# Patient Record
Sex: FEMALE | Race: BLACK OR AFRICAN AMERICAN | Employment: FULL TIME | URBAN - METROPOLITAN AREA
[De-identification: names, ages, dates, MRNs, and addresses within clinical notes are randomized per-mention and may not be internally consistent; named-entity substitution may affect disease eponyms.]

---

## 2019-12-15 ENCOUNTER — HOSPITAL ENCOUNTER (INPATIENT)
Age: 29
LOS: 4 days | Discharge: HOME | End: 2019-12-19
Attending: OBSTETRICS & GYNECOLOGY | Admitting: OBSTETRICS & GYNECOLOGY
Payer: COMMERCIAL

## 2019-12-15 DIAGNOSIS — Z34.90 ENCOUNTER FOR PLANNED INDUCTION OF LABOR: Primary | ICD-10-CM

## 2019-12-15 PROBLEM — Z3A.39 39 WEEKS GESTATION OF PREGNANCY: Status: ACTIVE | Noted: 2019-12-15

## 2019-12-15 PROBLEM — O13.3 GESTATIONAL HTN, THIRD TRIMESTER: Status: ACTIVE | Noted: 2019-12-15

## 2019-12-15 LAB
ERYTHROCYTE [DISTWIDTH] IN BLOOD BY AUTOMATED COUNT: 13.2 % (ref 11.9–14.6)
HCT VFR BLD AUTO: 35.4 % (ref 35.8–46.3)
HGB BLD-MCNC: 11.5 G/DL (ref 11.7–15.4)
MCH RBC QN AUTO: 33 PG (ref 26.1–32.9)
MCHC RBC AUTO-ENTMCNC: 32.5 G/DL (ref 31.4–35)
MCV RBC AUTO: 101.4 FL (ref 79.6–97.8)
NRBC # BLD: 0 K/UL (ref 0–0.2)
PLATELET # BLD AUTO: 152 K/UL (ref 150–450)
PMV BLD AUTO: 12.6 FL (ref 9.4–12.3)
RBC # BLD AUTO: 3.49 M/UL (ref 4.05–5.2)
WBC # BLD AUTO: 8.1 K/UL (ref 4.3–11.1)

## 2019-12-15 PROCEDURE — 10907ZC DRAINAGE OF AMNIOTIC FLUID, THERAPEUTIC FROM PRODUCTS OF CONCEPTION, VIA NATURAL OR ARTIFICIAL OPENING: ICD-10-PCS | Performed by: OBSTETRICS & GYNECOLOGY

## 2019-12-15 PROCEDURE — 75410000002 HC LABOR FEE PER 1 HR: Performed by: OBSTETRICS & GYNECOLOGY

## 2019-12-15 PROCEDURE — 85027 COMPLETE CBC AUTOMATED: CPT

## 2019-12-15 PROCEDURE — 77030019905 HC CATH URETH INTMIT MDII -A

## 2019-12-15 PROCEDURE — 74011250637 HC RX REV CODE- 250/637: Performed by: OBSTETRICS & GYNECOLOGY

## 2019-12-15 PROCEDURE — 74011000258 HC RX REV CODE- 258: Performed by: OBSTETRICS & GYNECOLOGY

## 2019-12-15 PROCEDURE — 74011250636 HC RX REV CODE- 250/636: Performed by: OBSTETRICS & GYNECOLOGY

## 2019-12-15 PROCEDURE — 3E033VJ INTRODUCTION OF OTHER HORMONE INTO PERIPHERAL VEIN, PERCUTANEOUS APPROACH: ICD-10-PCS | Performed by: OBSTETRICS & GYNECOLOGY

## 2019-12-15 PROCEDURE — 86900 BLOOD TYPING SEROLOGIC ABO: CPT

## 2019-12-15 PROCEDURE — 65270000029 HC RM PRIVATE

## 2019-12-15 RX ORDER — ZOLPIDEM TARTRATE 5 MG/1
5 TABLET ORAL
Status: DISCONTINUED | OUTPATIENT
Start: 2019-12-15 | End: 2019-12-17

## 2019-12-15 RX ORDER — LIDOCAINE HYDROCHLORIDE 10 MG/ML
1 INJECTION INFILTRATION; PERINEURAL
Status: ACTIVE | OUTPATIENT
Start: 2019-12-15 | End: 2019-12-16

## 2019-12-15 RX ORDER — OXYTOCIN/RINGER'S LACTATE 15/250 ML
250 PLASTIC BAG, INJECTION (ML) INTRAVENOUS ONCE
Status: ACTIVE | OUTPATIENT
Start: 2019-12-15 | End: 2019-12-16

## 2019-12-15 RX ORDER — SODIUM CHLORIDE 0.9 % (FLUSH) 0.9 %
5-40 SYRINGE (ML) INJECTION EVERY 8 HOURS
Status: DISCONTINUED | OUTPATIENT
Start: 2019-12-15 | End: 2019-12-17

## 2019-12-15 RX ORDER — SODIUM CHLORIDE 0.9 % (FLUSH) 0.9 %
5-40 SYRINGE (ML) INJECTION AS NEEDED
Status: DISCONTINUED | OUTPATIENT
Start: 2019-12-15 | End: 2019-12-17

## 2019-12-15 RX ORDER — OXYTOCIN/RINGER'S LACTATE 30/500 ML
0-25 PLASTIC BAG, INJECTION (ML) INTRAVENOUS
Status: DISCONTINUED | OUTPATIENT
Start: 2019-12-15 | End: 2019-12-17 | Stop reason: HOSPADM

## 2019-12-15 RX ORDER — LIDOCAINE HYDROCHLORIDE 20 MG/ML
JELLY TOPICAL
Status: ACTIVE | OUTPATIENT
Start: 2019-12-15 | End: 2019-12-16

## 2019-12-15 RX ORDER — BUTORPHANOL TARTRATE 2 MG/ML
1 INJECTION INTRAMUSCULAR; INTRAVENOUS
Status: DISCONTINUED | OUTPATIENT
Start: 2019-12-15 | End: 2019-12-17 | Stop reason: HOSPADM

## 2019-12-15 RX ORDER — MINERAL OIL
120 OIL (ML) ORAL
Status: ACTIVE | OUTPATIENT
Start: 2019-12-15 | End: 2019-12-16

## 2019-12-15 RX ORDER — DEXTROSE, SODIUM CHLORIDE, SODIUM LACTATE, POTASSIUM CHLORIDE, AND CALCIUM CHLORIDE 5; .6; .31; .03; .02 G/100ML; G/100ML; G/100ML; G/100ML; G/100ML
125 INJECTION, SOLUTION INTRAVENOUS CONTINUOUS
Status: DISCONTINUED | OUTPATIENT
Start: 2019-12-15 | End: 2019-12-17

## 2019-12-15 RX ADMIN — ZOLPIDEM TARTRATE 5 MG: 5 TABLET ORAL at 23:19

## 2019-12-15 RX ADMIN — SODIUM CHLORIDE 5 MILLION UNITS: 900 INJECTION, SOLUTION INTRAVENOUS at 21:25

## 2019-12-15 RX ADMIN — SODIUM CHLORIDE, SODIUM LACTATE, POTASSIUM CHLORIDE, CALCIUM CHLORIDE, AND DEXTROSE MONOHYDRATE 125 ML/HR: 600; 310; 30; 20; 5 INJECTION, SOLUTION INTRAVENOUS at 21:27

## 2019-12-15 RX ADMIN — MISOPROSTOL 25 MCG: 100 TABLET ORAL at 21:09

## 2019-12-16 ENCOUNTER — ANESTHESIA (OUTPATIENT)
Dept: MOTHER INFANT UNIT | Age: 29
End: 2019-12-16
Payer: COMMERCIAL

## 2019-12-16 LAB
ABO + RH BLD: NORMAL
BLOOD GROUP ANTIBODIES SERPL: NORMAL
ERYTHROCYTE [DISTWIDTH] IN BLOOD BY AUTOMATED COUNT: 12.8 % (ref 11.9–14.6)
HCT VFR BLD AUTO: 36 % (ref 35.8–46.3)
HGB BLD-MCNC: 11.8 G/DL (ref 11.7–15.4)
MCH RBC QN AUTO: 33 PG (ref 26.1–32.9)
MCHC RBC AUTO-ENTMCNC: 32.8 G/DL (ref 31.4–35)
MCV RBC AUTO: 100.6 FL (ref 79.6–97.8)
NRBC # BLD: 0 K/UL (ref 0–0.2)
PLATELET # BLD AUTO: 153 K/UL (ref 150–450)
PMV BLD AUTO: 12.2 FL (ref 9.4–12.3)
RBC # BLD AUTO: 3.58 M/UL (ref 4.05–5.2)
SPECIMEN EXP DATE BLD: NORMAL
WBC # BLD AUTO: 9.5 K/UL (ref 4.3–11.1)

## 2019-12-16 PROCEDURE — 85027 COMPLETE CBC AUTOMATED: CPT

## 2019-12-16 PROCEDURE — 75410000002 HC LABOR FEE PER 1 HR: Performed by: OBSTETRICS & GYNECOLOGY

## 2019-12-16 PROCEDURE — 36415 COLL VENOUS BLD VENIPUNCTURE: CPT

## 2019-12-16 PROCEDURE — 74011250636 HC RX REV CODE- 250/636: Performed by: OBSTETRICS & GYNECOLOGY

## 2019-12-16 PROCEDURE — 74011250637 HC RX REV CODE- 250/637: Performed by: OBSTETRICS & GYNECOLOGY

## 2019-12-16 PROCEDURE — 65270000029 HC RM PRIVATE

## 2019-12-16 RX ORDER — MISOPROSTOL 100 UG/1
50 TABLET ORAL
Status: DISCONTINUED | OUTPATIENT
Start: 2019-12-16 | End: 2019-12-17

## 2019-12-16 RX ADMIN — MISOPROSTOL 25 MCG: 100 TABLET ORAL at 01:54

## 2019-12-16 RX ADMIN — PENICILLIN G POTASSIUM 2.5 MILLION UNITS: 20000000 POWDER, FOR SOLUTION INTRAVENOUS at 06:18

## 2019-12-16 RX ADMIN — ZOLPIDEM TARTRATE 5 MG: 5 TABLET ORAL at 22:31

## 2019-12-16 RX ADMIN — PENICILLIN G POTASSIUM 2.5 MILLION UNITS: 20000000 POWDER, FOR SOLUTION INTRAVENOUS at 10:22

## 2019-12-16 RX ADMIN — MISOPROSTOL 50 MCG: 100 TABLET ORAL at 18:18

## 2019-12-16 RX ADMIN — MISOPROSTOL 25 MCG: 100 TABLET ORAL at 22:31

## 2019-12-16 RX ADMIN — MISOPROSTOL 25 MCG: 100 TABLET ORAL at 22:16

## 2019-12-16 RX ADMIN — PENICILLIN G POTASSIUM 2.5 MILLION UNITS: 20000000 POWDER, FOR SOLUTION INTRAVENOUS at 01:54

## 2019-12-16 NOTE — PROGRESS NOTES
Pt to L & D for scheduled IOL. Pt requesting bariatric gown. Will call 3rd floor and have one sent up.

## 2019-12-16 NOTE — PROGRESS NOTES
Pt would like to shower now. Asked for IV to be removed, very uncomfortable. Would rather restart it than deal with current iv placement.

## 2019-12-16 NOTE — PROGRESS NOTES
MD at bedside. Attempt of AROM unsuccessful, CHIQUITA fetal monitor patches replaced. MD orders received to start pitocin on pt.

## 2019-12-16 NOTE — PROGRESS NOTES
Dr Eric Walls on the phone. Orders to stop pitocin, pt may eat, may stop penicillin, and will restart cytotec tonight. Restart antibiotics when pt starts laboring.

## 2019-12-16 NOTE — H&P
History & Physical    Name: Gerry Meade MRN: [id number]  SSN: [id number]    YOB: 1990  Age: 30 y.o. Sex: female      Subjective:     Estimated Date of Delivery: 19  OB History    Para Term  AB Living   4       2 1   SAB TAB Ectopic Molar Multiple Live Births                    # Outcome Date GA Lbr Shaka/2nd Weight Sex Delivery Anes PTL Lv   4 Current            3             2 AB            1 AB                Ms. Regis Gross is admitted with pregnancy at 39w1d for induction of labor due to hypertension. Prenatal course was complicated by increase in BP. Please see prenatal records for details. Past Medical History:   Diagnosis Date    Gestational hypertension     History of elective  8/1/2016    x2    PCOS (polycystic ovarian syndrome)      Past Surgical History:   Procedure Laterality Date    HX OTHER SURGICAL  2014    EAB x2    HX TONSILLECTOMY  2010    HX WISDOM TEETH EXTRACTION       Social History     Occupational History    Not on file   Tobacco Use    Smoking status: Never Smoker    Smokeless tobacco: Never Used   Substance and Sexual Activity    Alcohol use: No    Drug use: No    Sexual activity: Yes     Partners: Male     Birth control/protection: None     Comment: trying to conceive     Family History   Problem Relation Age of Onset    Hypertension Maternal Grandmother        No Known Allergies  Prior to Admission medications    Medication Sig Start Date End Date Taking? Authorizing Provider   PNV No12-Iron-FA-DSS-OM-3 29 mg iron-1 mg -50 mg CPKD Take  by mouth. Yes Provider, Historical        Review of Systems: A comprehensive review of systems was negative except for that written in the History of Present Illness.     Objective:     Vitals:  Vitals:    19 1228 19 0852 12/15/19 2051   BP:   137/70   Pulse:   (!) 108   Resp:   18   Temp:   98.6 °F (37 °C)   Weight: 144.2 kg (318 lb)     Height:  5' 9\" (1.753 m) Physical Exam:  Patient without distress. Heart: Regular rate and rhythm  Lung: clear to auscultation throughout lung fields, no wheezes, no rales, no rhonchi and normal respiratory effort  Abdomen: soft, nontender  Fundus: soft and non tender  Perineum: blood absent, amniotic fluid absent  Cervical Exam: 1 cm dilated    50% effaced    Consistency: Soft  Membranes:  Intact  Attempted to AROM but unsuccessful  Fetal Heart Rate: Reactive          Prenatal Labs:   Lab Results   Component Value Date/Time    ABO/Rh(D) A POSITIVE 12/15/2019 09:01 PM       Impression/Plan:     Active Problems:    39 weeks gestation of pregnancy (12/15/2019)      Gestational HTN, third trimester (12/15/2019)      Encounter for planned induction of labor (12/15/2019)         Plan: Admit for induction of labor. Group B Strep positive, will treat prophylactically with penicillin.

## 2019-12-16 NOTE — PROGRESS NOTES
Dr Glynn Hodges updated to pt status- pt feeling mild-moderate contractions. Pitocin is at 22 mu/min. Orders for RN to check cervix.

## 2019-12-16 NOTE — PROGRESS NOTES
12/15/19 2105   Cervical Exam   Dilation (cm) 1   Eff 50 %   Station -2   Position Posterior   Cervical Consistency Soft   Vaginal exam done byTimothy shirley rn   Cytotec 25mcg given buccal as ordered.

## 2019-12-16 NOTE — PROGRESS NOTES
PCN infusing as ordered. Cytotec 25 mcg given as ordered. Pt denies needs at this time. Encouraged to call out PRN.

## 2019-12-17 ENCOUNTER — ANESTHESIA (OUTPATIENT)
Dept: LABOR AND DELIVERY | Age: 29
End: 2019-12-17
Payer: COMMERCIAL

## 2019-12-17 ENCOUNTER — ANESTHESIA EVENT (OUTPATIENT)
Dept: LABOR AND DELIVERY | Age: 29
End: 2019-12-17
Payer: COMMERCIAL

## 2019-12-17 PROCEDURE — 76060000078 HC EPIDURAL ANESTHESIA: Performed by: OBSTETRICS & GYNECOLOGY

## 2019-12-17 PROCEDURE — 77030002974 HC SUT PLN J&J -A: Performed by: OBSTETRICS & GYNECOLOGY

## 2019-12-17 PROCEDURE — A4300 CATH IMPL VASC ACCESS PORTAL: HCPCS | Performed by: ANESTHESIOLOGY

## 2019-12-17 PROCEDURE — 77030014125 HC TY EPDRL BBMI -B: Performed by: ANESTHESIOLOGY

## 2019-12-17 PROCEDURE — 74011250636 HC RX REV CODE- 250/636: Performed by: NURSE ANESTHETIST, CERTIFIED REGISTERED

## 2019-12-17 PROCEDURE — 76060000078 HC EPIDURAL ANESTHESIA

## 2019-12-17 PROCEDURE — 65270000029 HC RM PRIVATE

## 2019-12-17 PROCEDURE — 74011000250 HC RX REV CODE- 250: Performed by: ANESTHESIOLOGY

## 2019-12-17 PROCEDURE — 77030010848 HC CATH INTUTR PRSS KOLB -B

## 2019-12-17 PROCEDURE — 77030034696 HC CATH URETH FOL 2W BARD -A

## 2019-12-17 PROCEDURE — 74011000258 HC RX REV CODE- 258: Performed by: OBSTETRICS & GYNECOLOGY

## 2019-12-17 PROCEDURE — 77030003665 HC NDL SPN BBMI -A: Performed by: ANESTHESIOLOGY

## 2019-12-17 PROCEDURE — 75410000003 HC RECOV DEL/VAG/CSECN EA 0.5 HR: Performed by: OBSTETRICS & GYNECOLOGY

## 2019-12-17 PROCEDURE — 77030011943: Performed by: OBSTETRICS & GYNECOLOGY

## 2019-12-17 PROCEDURE — 74011000250 HC RX REV CODE- 250: Performed by: NURSE ANESTHETIST, CERTIFIED REGISTERED

## 2019-12-17 PROCEDURE — C1765 ADHESION BARRIER: HCPCS | Performed by: OBSTETRICS & GYNECOLOGY

## 2019-12-17 PROCEDURE — 76010000392 HC C SECN EA ADDL 0.5 HR: Performed by: OBSTETRICS & GYNECOLOGY

## 2019-12-17 PROCEDURE — 77030019905 HC CATH URETH INTMIT MDII -A

## 2019-12-17 PROCEDURE — 77030018846 HC SOL IRR STRL H20 ICUM -A: Performed by: OBSTETRICS & GYNECOLOGY

## 2019-12-17 PROCEDURE — 77030032490 HC SLV COMPR SCD KNE COVD -B: Performed by: OBSTETRICS & GYNECOLOGY

## 2019-12-17 PROCEDURE — 77030018809 HC RETRCTR ALXSO DISP AMR -B: Performed by: OBSTETRICS & GYNECOLOGY

## 2019-12-17 PROCEDURE — 74011250637 HC RX REV CODE- 250/637: Performed by: OBSTETRICS & GYNECOLOGY

## 2019-12-17 PROCEDURE — 77030033542 HC RETRCTR RETNTS PANICLS GQSM -B: Performed by: OBSTETRICS & GYNECOLOGY

## 2019-12-17 PROCEDURE — 74011250637 HC RX REV CODE- 250/637: Performed by: ANESTHESIOLOGY

## 2019-12-17 PROCEDURE — 77030031139 HC SUT VCRL2 J&J -A: Performed by: OBSTETRICS & GYNECOLOGY

## 2019-12-17 PROCEDURE — 76010000391 HC C SECN FIRST 1 HR: Performed by: OBSTETRICS & GYNECOLOGY

## 2019-12-17 PROCEDURE — 74011250636 HC RX REV CODE- 250/636: Performed by: ANESTHESIOLOGY

## 2019-12-17 PROCEDURE — 77030040361 HC SLV COMPR DVT MDII -B

## 2019-12-17 PROCEDURE — 77010026065 HC OXYGEN MINIMUM MEDICAL AIR

## 2019-12-17 PROCEDURE — 77030018836 HC SOL IRR NACL ICUM -A: Performed by: OBSTETRICS & GYNECOLOGY

## 2019-12-17 PROCEDURE — 75410000002 HC LABOR FEE PER 1 HR

## 2019-12-17 PROCEDURE — 74011250636 HC RX REV CODE- 250/636: Performed by: OBSTETRICS & GYNECOLOGY

## 2019-12-17 RX ORDER — ONDANSETRON 2 MG/ML
INJECTION INTRAMUSCULAR; INTRAVENOUS AS NEEDED
Status: DISCONTINUED | OUTPATIENT
Start: 2019-12-17 | End: 2019-12-17 | Stop reason: HOSPADM

## 2019-12-17 RX ORDER — TRISODIUM CITRATE DIHYDRATE AND CITRIC ACID MONOHYDRATE 500; 334 MG/5ML; MG/5ML
30 SOLUTION ORAL ONCE
Status: DISCONTINUED | OUTPATIENT
Start: 2019-12-17 | End: 2019-12-17 | Stop reason: HOSPADM

## 2019-12-17 RX ORDER — DIPHENHYDRAMINE HYDROCHLORIDE 50 MG/ML
12.5 INJECTION, SOLUTION INTRAMUSCULAR; INTRAVENOUS
Status: DISCONTINUED | OUTPATIENT
Start: 2019-12-17 | End: 2019-12-18

## 2019-12-17 RX ORDER — SODIUM CHLORIDE, SODIUM LACTATE, POTASSIUM CHLORIDE, CALCIUM CHLORIDE 600; 310; 30; 20 MG/100ML; MG/100ML; MG/100ML; MG/100ML
INJECTION, SOLUTION INTRAVENOUS
Status: DISCONTINUED | OUTPATIENT
Start: 2019-12-17 | End: 2019-12-17 | Stop reason: HOSPADM

## 2019-12-17 RX ORDER — NALOXONE HYDROCHLORIDE 0.4 MG/ML
0.2 INJECTION, SOLUTION INTRAMUSCULAR; INTRAVENOUS; SUBCUTANEOUS
Status: DISCONTINUED | OUTPATIENT
Start: 2019-12-17 | End: 2019-12-18

## 2019-12-17 RX ORDER — BUTORPHANOL TARTRATE 1 MG/ML
INJECTION INTRAMUSCULAR; INTRAVENOUS AS NEEDED
Status: DISCONTINUED | OUTPATIENT
Start: 2019-12-17 | End: 2019-12-17 | Stop reason: HOSPADM

## 2019-12-17 RX ORDER — OXYCODONE HYDROCHLORIDE 5 MG/1
10 TABLET ORAL
Status: DISCONTINUED | OUTPATIENT
Start: 2019-12-17 | End: 2019-12-18

## 2019-12-17 RX ORDER — EPHEDRINE SULFATE/0.9% NACL/PF 50 MG/5 ML
SYRINGE (ML) INTRAVENOUS AS NEEDED
Status: DISCONTINUED | OUTPATIENT
Start: 2019-12-17 | End: 2019-12-17 | Stop reason: HOSPADM

## 2019-12-17 RX ORDER — ONDANSETRON 2 MG/ML
4 INJECTION INTRAMUSCULAR; INTRAVENOUS
Status: DISCONTINUED | OUTPATIENT
Start: 2019-12-17 | End: 2019-12-18

## 2019-12-17 RX ORDER — ACETAMINOPHEN 500 MG
1000 TABLET ORAL
Status: DISCONTINUED | OUTPATIENT
Start: 2019-12-17 | End: 2019-12-18

## 2019-12-17 RX ORDER — FAMOTIDINE 10 MG/1
20 TABLET ORAL ONCE
Status: COMPLETED | OUTPATIENT
Start: 2019-12-17 | End: 2019-12-17

## 2019-12-17 RX ORDER — SODIUM CHLORIDE, SODIUM LACTATE, POTASSIUM CHLORIDE, CALCIUM CHLORIDE 600; 310; 30; 20 MG/100ML; MG/100ML; MG/100ML; MG/100ML
125 INJECTION, SOLUTION INTRAVENOUS CONTINUOUS
Status: DISCONTINUED | OUTPATIENT
Start: 2019-12-17 | End: 2019-12-18

## 2019-12-17 RX ORDER — HYDROMORPHONE HYDROCHLORIDE 1 MG/ML
1 INJECTION, SOLUTION INTRAMUSCULAR; INTRAVENOUS; SUBCUTANEOUS
Status: DISCONTINUED | OUTPATIENT
Start: 2019-12-17 | End: 2019-12-18

## 2019-12-17 RX ORDER — PHENYLEPHRINE HYDROCHLORIDE 10 MG/ML
INJECTION INTRAVENOUS AS NEEDED
Status: DISCONTINUED | OUTPATIENT
Start: 2019-12-17 | End: 2019-12-17 | Stop reason: HOSPADM

## 2019-12-17 RX ORDER — NALBUPHINE HYDROCHLORIDE 10 MG/ML
5 INJECTION, SOLUTION INTRAMUSCULAR; INTRAVENOUS; SUBCUTANEOUS
Status: DISCONTINUED | OUTPATIENT
Start: 2019-12-17 | End: 2019-12-18

## 2019-12-17 RX ORDER — MORPHINE SULFATE 0.5 MG/ML
INJECTION, SOLUTION EPIDURAL; INTRATHECAL; INTRAVENOUS AS NEEDED
Status: DISCONTINUED | OUTPATIENT
Start: 2019-12-17 | End: 2019-12-17 | Stop reason: HOSPADM

## 2019-12-17 RX ORDER — LIDOCAINE HYDROCHLORIDE 20 MG/ML
INJECTION, SOLUTION EPIDURAL; INFILTRATION; INTRACAUDAL; PERINEURAL AS NEEDED
Status: DISCONTINUED | OUTPATIENT
Start: 2019-12-17 | End: 2019-12-17 | Stop reason: HOSPADM

## 2019-12-17 RX ORDER — ACETAMINOPHEN 10 MG/ML
1000 INJECTION, SOLUTION INTRAVENOUS ONCE
Status: COMPLETED | OUTPATIENT
Start: 2019-12-17 | End: 2019-12-17

## 2019-12-17 RX ORDER — SODIUM CHLORIDE, SODIUM LACTATE, POTASSIUM CHLORIDE, CALCIUM CHLORIDE 600; 310; 30; 20 MG/100ML; MG/100ML; MG/100ML; MG/100ML
150 INJECTION, SOLUTION INTRAVENOUS CONTINUOUS
Status: DISCONTINUED | OUTPATIENT
Start: 2019-12-17 | End: 2019-12-17 | Stop reason: HOSPADM

## 2019-12-17 RX ORDER — ROPIVACAINE HYDROCHLORIDE 2 MG/ML
INJECTION, SOLUTION EPIDURAL; INFILTRATION; PERINEURAL
Status: DISCONTINUED | OUTPATIENT
Start: 2019-12-17 | End: 2019-12-17 | Stop reason: HOSPADM

## 2019-12-17 RX ORDER — KETOROLAC TROMETHAMINE 30 MG/ML
30 INJECTION, SOLUTION INTRAMUSCULAR; INTRAVENOUS
Status: DISCONTINUED | OUTPATIENT
Start: 2019-12-17 | End: 2019-12-18

## 2019-12-17 RX ORDER — OXYTOCIN/RINGER'S LACTATE 30/500 ML
PLASTIC BAG, INJECTION (ML) INTRAVENOUS
Status: DISCONTINUED | OUTPATIENT
Start: 2019-12-17 | End: 2019-12-17 | Stop reason: HOSPADM

## 2019-12-17 RX ORDER — LIDOCAINE HYDROCHLORIDE AND EPINEPHRINE 15; 5 MG/ML; UG/ML
INJECTION, SOLUTION EPIDURAL
Status: COMPLETED | OUTPATIENT
Start: 2019-12-17 | End: 2019-12-17

## 2019-12-17 RX ADMIN — SODIUM CHLORIDE, SODIUM LACTATE, POTASSIUM CHLORIDE, CALCIUM CHLORIDE, AND DEXTROSE MONOHYDRATE 125 ML/HR: 600; 310; 30; 20; 5 INJECTION, SOLUTION INTRAVENOUS at 07:56

## 2019-12-17 RX ADMIN — CEFAZOLIN 3 G: 1 INJECTION, POWDER, FOR SOLUTION INTRAVENOUS at 18:04

## 2019-12-17 RX ADMIN — Medication 10 ML/HR: at 10:52

## 2019-12-17 RX ADMIN — FAMOTIDINE 20 MG: 10 TABLET ORAL at 17:46

## 2019-12-17 RX ADMIN — LIDOCAINE HYDROCHLORIDE 5 ML: 20 INJECTION, SOLUTION EPIDURAL; INFILTRATION; INTRACAUDAL; PERINEURAL at 17:56

## 2019-12-17 RX ADMIN — LIDOCAINE HYDROCHLORIDE,EPINEPHRINE BITARTRATE 5 ML: 15; .005 INJECTION, SOLUTION EPIDURAL; INFILTRATION; INTRACAUDAL; PERINEURAL at 10:42

## 2019-12-17 RX ADMIN — MORPHINE SULFATE 1 MG: 0.5 INJECTION, SOLUTION EPIDURAL; INTRATHECAL; INTRAVENOUS at 19:05

## 2019-12-17 RX ADMIN — BUTORPHANOL TARTRATE 1 MG: 2 INJECTION, SOLUTION INTRAMUSCULAR; INTRAVENOUS at 09:07

## 2019-12-17 RX ADMIN — SODIUM CHLORIDE 5 MILLION UNITS: 900 INJECTION, SOLUTION INTRAVENOUS at 08:09

## 2019-12-17 RX ADMIN — LIDOCAINE HYDROCHLORIDE 5 ML: 20 INJECTION, SOLUTION EPIDURAL; INFILTRATION; INTRACAUDAL; PERINEURAL at 18:03

## 2019-12-17 RX ADMIN — LIDOCAINE HYDROCHLORIDE 5 ML: 20 INJECTION, SOLUTION EPIDURAL; INFILTRATION; INTRACAUDAL; PERINEURAL at 18:07

## 2019-12-17 RX ADMIN — PHENYLEPHRINE HYDROCHLORIDE 100 MCG: 10 INJECTION INTRAVENOUS at 18:47

## 2019-12-17 RX ADMIN — SODIUM CHLORIDE, SODIUM LACTATE, POTASSIUM CHLORIDE, AND CALCIUM CHLORIDE: 600; 310; 30; 20 INJECTION, SOLUTION INTRAVENOUS at 19:13

## 2019-12-17 RX ADMIN — ACETAMINOPHEN 1000 MG: 10 INJECTION, SOLUTION INTRAVENOUS at 21:57

## 2019-12-17 RX ADMIN — MORPHINE SULFATE 1 MG: 0.5 INJECTION, SOLUTION EPIDURAL; INTRATHECAL; INTRAVENOUS at 19:07

## 2019-12-17 RX ADMIN — MISOPROSTOL 50 MCG: 100 TABLET ORAL at 02:37

## 2019-12-17 RX ADMIN — Medication 10 MG: at 11:12

## 2019-12-17 RX ADMIN — MORPHINE SULFATE 2 MG: 0.5 INJECTION, SOLUTION EPIDURAL; INTRATHECAL; INTRAVENOUS at 19:00

## 2019-12-17 RX ADMIN — MORPHINE SULFATE 1 MG: 0.5 INJECTION, SOLUTION EPIDURAL; INTRATHECAL; INTRAVENOUS at 19:02

## 2019-12-17 RX ADMIN — BUTORPHANOL TARTRATE 2 MG: 1 INJECTION, SOLUTION INTRAMUSCULAR; INTRAVENOUS at 19:00

## 2019-12-17 RX ADMIN — Medication 500 ML/HR: at 18:25

## 2019-12-17 RX ADMIN — SODIUM CHLORIDE, SODIUM LACTATE, POTASSIUM CHLORIDE, AND CALCIUM CHLORIDE: 600; 310; 30; 20 INJECTION, SOLUTION INTRAVENOUS at 18:09

## 2019-12-17 RX ADMIN — AZITHROMYCIN DIHYDRATE 500 MG: 500 INJECTION, POWDER, LYOPHILIZED, FOR SOLUTION INTRAVENOUS at 18:30

## 2019-12-17 RX ADMIN — ONDANSETRON 4 MG: 2 INJECTION INTRAMUSCULAR; INTRAVENOUS at 18:24

## 2019-12-17 RX ADMIN — Medication 20 MG: at 11:11

## 2019-12-17 RX ADMIN — PENICILLIN G POTASSIUM 2.5 MILLION UNITS: 20000000 POWDER, FOR SOLUTION INTRAVENOUS at 15:49

## 2019-12-17 RX ADMIN — Medication 10 MG: at 18:47

## 2019-12-17 RX ADMIN — PENICILLIN G POTASSIUM 2.5 MILLION UNITS: 20000000 POWDER, FOR SOLUTION INTRAVENOUS at 11:59

## 2019-12-17 RX ADMIN — Medication 2 MILLI-UNITS/MIN: at 07:57

## 2019-12-17 RX ADMIN — KETOROLAC TROMETHAMINE 30 MG: 30 INJECTION, SOLUTION INTRAMUSCULAR at 21:57

## 2019-12-17 NOTE — PROGRESS NOTES
sbar to FARZANEH monahan. Pt assumed for care. FOB , Mother and daughter present at the bedside. Reactive tracing continues.

## 2019-12-17 NOTE — PROGRESS NOTES
SBAR report from Mary Garsia. Care assumed. 1045- Test dose given per Dr Parham Apo. 1051- Pt in right hip tilt. Dose given. Pt tolerated well.

## 2019-12-17 NOTE — PROGRESS NOTES
Received care of pt from DIAZ Mcgee RN. Awaiting Dr. Devin Soulier for plan of care, reviewed with pt, verbalizes understanding.

## 2019-12-17 NOTE — PROGRESS NOTES
1215 Office called for prenatal labs. States they will send over. 1330 Prenatal labs requested again. Waiting for office to fax.

## 2019-12-17 NOTE — ANESTHESIA PREPROCEDURE EVALUATION
Relevant Problems   No relevant active problems       Anesthetic History   No history of anesthetic complications            Review of Systems / Medical History  Patient summary reviewed and pertinent labs reviewed    Pulmonary  Within defined limits                 Neuro/Psych   Within defined limits           Cardiovascular    Hypertension              Exercise tolerance: >4 METS     GI/Hepatic/Renal     GERD: well controlled           Endo/Other        Morbid obesity     Other Findings              Physical Exam    Airway  Mallampati: III  TM Distance: 4 - 6 cm  Neck ROM: normal range of motion   Mouth opening: Normal     Cardiovascular    Rhythm: regular           Dental  No notable dental hx       Pulmonary                 Abdominal         Other Findings            Anesthetic Plan    ASA: 3, emergent  Anesthesia type: epidural      Post-op pain plan if not by surgeon: epidural opioid      Anesthetic plan and risks discussed with: Patient and Family      To or for emergency cs for fetal distress and failure to progress, well functioning epidural cath in place will be used for surgery

## 2019-12-17 NOTE — PROGRESS NOTES
sve due to fhr tracing. o2 in place. Pt on her left tilt. No cervical change. 5-6 cms with caput, -2 station with caput.

## 2019-12-17 NOTE — PROGRESS NOTES
Dr Toni Ybarra called- Nyla Bess given through nurse- late decelerations with no cervical change. Caput. 5-6 cms/ -2 station. Pt placed on the peanut and variability increasing at this time.

## 2019-12-17 NOTE — ANESTHESIA PROCEDURE NOTES
Epidural Block    Start time: 12/17/2019 10:31 AM  End time: 12/17/2019 10:42 AM  Performed by: Irean Severance, MD  Authorized by: Irean Severance, MD     Pre-Procedure  Indication: labor epidural    Preanesthetic Checklist: patient identified, risks and benefits discussed, anesthesia consent, patient being monitored, timeout performed and anesthesia consent    Timeout Time: 10:31        Epidural:   Patient position:  Seated  Prep region:  Lumbar  Prep: Patient draped and Chlorhexidine    Location:  L3-4    Needle and Epidural Catheter:   Needle Type:  Tuohy  Needle Gauge:  17 G  Injection Technique:  Loss of resistance using air  Attempts:  2  Catheter Size:  19 G  Events: no blood with aspiration, no cerebrospinal fluid with aspiration, no paresthesia and negative aspiration test    Test Dose:  Lidocaine 1.5% w/ epi and negative    Assessment:   Catheter Secured:  Tegaderm and tape  Insertion:  Uncomplicated  Patient tolerance:  Patient tolerated the procedure well with no immediate complications  All needles out intact, procedure tolerated well without problems

## 2019-12-17 NOTE — PROGRESS NOTES
SVE-4/90/-3  Pt has not changed since about noon. Her FHTs have had some decels off and on with variables, then improves to be reactive. Discussed with pt and  that she has had no progress. Explained that every time the nurse turns the pit back on she decels and she has to turn it off. Discussed risks and benefits of LTCS in detail. Pt wishes to proceed.

## 2019-12-17 NOTE — PROGRESS NOTES
1106- Late decels noted. Pt repositioned. IV fluid bolus infusing. 1114- FHR down to 60 for 4.5min. Pitocin off, O2 on at 10L. Pt repositioned to right side. 1127-  decreased variability. Dr Elodia Dolan notified of FHR and actions taken.

## 2019-12-17 NOTE — PROGRESS NOTES
Pericare/ pad change. Nessa tracing well now. Pt resting quietly. Feeling contractions now. pcn infusing- 5 million units.

## 2019-12-17 NOTE — PROGRESS NOTES
Variable decels continue. Pitocin off. O2 on at 10L. Pt repositioned. Dr Apolinar Lyon notified. Orders to call OB hospitalist to assess and to place [facility].

## 2019-12-17 NOTE — PROGRESS NOTES
0730- arom successfully by dr Edison Isabel. 1 cms.  Clear fluid  Iv patent, LR infusing KVO   Will start pitocin as ordered

## 2019-12-17 NOTE — PROGRESS NOTES
Verified with Dr. Travis Lynne as to pts orders for evening. Pt to receive 50mcg cytotec Q4 tonight, as appropriate.

## 2019-12-18 ENCOUNTER — ANESTHESIA EVENT (OUTPATIENT)
Dept: MOTHER INFANT UNIT | Age: 29
End: 2019-12-18
Payer: COMMERCIAL

## 2019-12-18 PROCEDURE — 74011250637 HC RX REV CODE- 250/637: Performed by: ANESTHESIOLOGY

## 2019-12-18 PROCEDURE — 74011250636 HC RX REV CODE- 250/636: Performed by: ANESTHESIOLOGY

## 2019-12-18 PROCEDURE — 65270000029 HC RM PRIVATE

## 2019-12-18 PROCEDURE — 74011250637 HC RX REV CODE- 250/637: Performed by: OBSTETRICS & GYNECOLOGY

## 2019-12-18 RX ORDER — IBUPROFEN 800 MG/1
800 TABLET ORAL EVERY 6 HOURS
Status: DISCONTINUED | OUTPATIENT
Start: 2019-12-18 | End: 2019-12-19 | Stop reason: HOSPADM

## 2019-12-18 RX ORDER — DOCUSATE SODIUM 100 MG/1
100 CAPSULE, LIQUID FILLED ORAL 2 TIMES DAILY
Status: DISCONTINUED | OUTPATIENT
Start: 2019-12-18 | End: 2019-12-19 | Stop reason: HOSPADM

## 2019-12-18 RX ORDER — SODIUM CHLORIDE 9 MG/ML
50 INJECTION, SOLUTION INTRAVENOUS CONTINUOUS
Status: DISCONTINUED | OUTPATIENT
Start: 2019-12-18 | End: 2019-12-18

## 2019-12-18 RX ORDER — OXYCODONE HYDROCHLORIDE 5 MG/1
5 TABLET ORAL
Status: DISCONTINUED | OUTPATIENT
Start: 2019-12-18 | End: 2019-12-19 | Stop reason: HOSPADM

## 2019-12-18 RX ORDER — ACETAMINOPHEN 500 MG
1000 TABLET ORAL EVERY 6 HOURS
Status: DISCONTINUED | OUTPATIENT
Start: 2019-12-18 | End: 2019-12-19 | Stop reason: HOSPADM

## 2019-12-18 RX ORDER — SODIUM CHLORIDE, SODIUM LACTATE, POTASSIUM CHLORIDE, CALCIUM CHLORIDE 600; 310; 30; 20 MG/100ML; MG/100ML; MG/100ML; MG/100ML
150 INJECTION, SOLUTION INTRAVENOUS CONTINUOUS
Status: DISCONTINUED | OUTPATIENT
Start: 2019-12-18 | End: 2019-12-18

## 2019-12-18 RX ORDER — SODIUM CHLORIDE 0.9 % (FLUSH) 0.9 %
5-40 SYRINGE (ML) INJECTION AS NEEDED
Status: DISCONTINUED | OUTPATIENT
Start: 2019-12-18 | End: 2019-12-18

## 2019-12-18 RX ORDER — NALOXONE HYDROCHLORIDE 0.4 MG/ML
0.4 INJECTION, SOLUTION INTRAMUSCULAR; INTRAVENOUS; SUBCUTANEOUS AS NEEDED
Status: DISCONTINUED | OUTPATIENT
Start: 2019-12-18 | End: 2019-12-19 | Stop reason: HOSPADM

## 2019-12-18 RX ORDER — DIPHENHYDRAMINE HCL 25 MG
25 CAPSULE ORAL
Status: DISCONTINUED | OUTPATIENT
Start: 2019-12-18 | End: 2019-12-19 | Stop reason: HOSPADM

## 2019-12-18 RX ORDER — ONDANSETRON 4 MG/1
4 TABLET, ORALLY DISINTEGRATING ORAL
Status: DISCONTINUED | OUTPATIENT
Start: 2019-12-18 | End: 2019-12-19 | Stop reason: HOSPADM

## 2019-12-18 RX ORDER — HYDROMORPHONE HYDROCHLORIDE 1 MG/ML
1 INJECTION, SOLUTION INTRAMUSCULAR; INTRAVENOUS; SUBCUTANEOUS
Status: DISCONTINUED | OUTPATIENT
Start: 2019-12-18 | End: 2019-12-19 | Stop reason: HOSPADM

## 2019-12-18 RX ORDER — SIMETHICONE 80 MG
80 TABLET,CHEWABLE ORAL
Status: DISCONTINUED | OUTPATIENT
Start: 2019-12-18 | End: 2019-12-19 | Stop reason: HOSPADM

## 2019-12-18 RX ORDER — SODIUM CHLORIDE 0.9 % (FLUSH) 0.9 %
5-40 SYRINGE (ML) INJECTION EVERY 8 HOURS
Status: DISCONTINUED | OUTPATIENT
Start: 2019-12-18 | End: 2019-12-19 | Stop reason: HOSPADM

## 2019-12-18 RX ORDER — SODIUM CHLORIDE, SODIUM LACTATE, POTASSIUM CHLORIDE, CALCIUM CHLORIDE 600; 310; 30; 20 MG/100ML; MG/100ML; MG/100ML; MG/100ML
100 INJECTION, SOLUTION INTRAVENOUS CONTINUOUS
Status: DISCONTINUED | OUTPATIENT
Start: 2019-12-18 | End: 2019-12-19 | Stop reason: HOSPADM

## 2019-12-18 RX ADMIN — DOCUSATE SODIUM 100 MG: 100 CAPSULE, LIQUID FILLED ORAL at 19:22

## 2019-12-18 RX ADMIN — KETOROLAC TROMETHAMINE 30 MG: 30 INJECTION, SOLUTION INTRAMUSCULAR at 04:06

## 2019-12-18 RX ADMIN — ACETAMINOPHEN 1000 MG: 500 TABLET, FILM COATED ORAL at 08:09

## 2019-12-18 RX ADMIN — OXYCODONE 10 MG: 5 TABLET ORAL at 11:54

## 2019-12-18 RX ADMIN — SODIUM CHLORIDE, SODIUM LACTATE, POTASSIUM CHLORIDE, AND CALCIUM CHLORIDE 125 ML/HR: 600; 310; 30; 20 INJECTION, SOLUTION INTRAVENOUS at 01:35

## 2019-12-18 RX ADMIN — ACETAMINOPHEN 1000 MG: 500 TABLET, FILM COATED ORAL at 13:54

## 2019-12-18 RX ADMIN — OXYCODONE 5 MG: 5 TABLET ORAL at 19:22

## 2019-12-18 RX ADMIN — ACETAMINOPHEN 1000 MG: 500 TABLET, FILM COATED ORAL at 22:27

## 2019-12-18 RX ADMIN — IBUPROFEN 800 MG: 800 TABLET, FILM COATED ORAL at 19:22

## 2019-12-18 RX ADMIN — KETOROLAC TROMETHAMINE 30 MG: 30 INJECTION, SOLUTION INTRAMUSCULAR at 10:02

## 2019-12-18 RX ADMIN — SIMETHICONE CHEW TAB 80 MG 80 MG: 80 TABLET ORAL at 22:27

## 2019-12-18 NOTE — PROGRESS NOTES
SBAR OUT Report: Mother    Verbal report given to Laura Sanchez RN on this patient, who is now being transferred to MIU for routine progression of care. The patient is wearing a green \"Anesthesia-Duramorph\" band. Report consisted of patient's Situation, Background, Assessment and Recommendations (SBAR). [place] ID bands were compared with the identification form, and verified with the patient and receiving nurse. Information from the Procedure Summary, Intake/Output and MAR and the [place] Report was reviewed with the receiving nurse; opportunity for questions and clarification provided.

## 2019-12-18 NOTE — PROGRESS NOTES
Chart reviewed - no needs identified.  made introduction to family and provided informational packet on  mood disorder education/resources. Patient denies any history of postpartum depression/anxiety. Family receptive to receiving information and denied any additional needs from . Family has 's contact information should any needs/questions arise.     BAKARI Nicholas  Bryce   [phone]

## 2019-12-18 NOTE — LACTATION NOTE

## 2019-12-18 NOTE — LACTATION NOTE
This note was copied from a baby's chart. In to see mom and infant for help w/ feeding. Mom's 2nd child. 1st baby never latched well, pumped and bottle fed for 6-7 months. Did not have to supplement w/ formula during that time. Mom has very short nipples, thick areola, nipple that bunches and turtles in w/ compression. Attempted w/ mom for 15 minutes- baby sleepy. No sustained sucking. Encouraged mom to try to pump few minutes before next attempt to see if further helps rosario nipple to help w/ latching. Pump after any poor/fair feeds. May need nipple shield in future if baby continues to have hard time getting on. Reviewed 1st and 2nd 24 hr feeding/output expectations. Mom pumping and lactation fed baby 5 mls formula afer feeding attempt per mom's request. Brenda Garcia does finger feeding w/ curve tip syringe well.

## 2019-12-18 NOTE — L&D DELIVERY NOTE
LOW TRANSVERSE  SECTION   FULL OP NOTE    PATIENT: Khanh Bolanos  MRN: [id number]      PREOPERATIVE DIAGNOSIS:39.2 weeks, Arrest of dilation, Arrest of descent, fetal intolerance       POSTOPERATIVE DIAGNOSIS: Same    PROCEDURE:  Low transverse  section    SURGEON: Peg Barclay MD    ANESTHESIA: Spinal    ESTIMATED BLOOD LOSS:   500cc        PROCEDURE IN DETAIL: The patient was taken to the operating room where spinal anesthesia was found to be adequate. Time out was done to confirm the operating procedure, surgeon, patient and site. Once confirmed by the team, procedure was started. The patient was prepped and draped in the normal sterile fashion. A Pfannenstiel skin incision was made with a scalpel and carried down to the underlying fascia. The fascial incision was extended laterally with Vasquez scissors. The superior edge of the fascial incision was grasped with Kocher clamps, tented up, and the underlying rectus muscle dissected off bluntly. The inferior edge of the rectus fascia was grasped with Kocher clamps, tented up, and the underlying rectus muscle dissected off bluntly. The rectus muscles were divided in the midline bluntly. The peritoneum was entered bluntly and extended superiorly and inferiorly with the Metzenbaum scissors. A bladder blade was then inserted. The vesicouterine peritoneum was identified, grasped with pickups and entered sharply with the Metzenbaum scissors and extended laterally. The bladder flap was then created digitally and a bladder blade was reinserted. A low transverse uterine incision was made with the scalpel and extended laterally with blunt finger dissection. The babys head was then delivered atraumatically. The nose and mouth were suctioned. The cord was clamped and cut. The baby was handed off to the awaiting  Intensive Care Unit staff. The placenta was then delivered spontaneously.  The uterus was exteriorized and the uterus was cleared of all clots and debris. The uterine incision was closed in two layers; the first layer was a running locked layer of 0 Vicryl and the second layer was an imbricating layer of 0 Vicryl. Good hemostasis was assured. The bladder flap was then reapproximated with 2-0 Vicryl in a running fashion. Good hemostasis was assured. Paracolic gutters were washed with warm normal saline. The uterus was returned to the abdomen. A piece of Intercede was placed over the uterine incision and the peritoneum was then closed with 2-0 Vicryl in a running fashion. The fascia was closed with 0-Vicryl in a running fashion. Good hemostasis was assured throughout. The subcuticular layers were reapproximated with 2-0 plain gut in an interrupted fashion. The skin was closed with 4-0 Vicryl in a subcuticular fashion. The patient tolerated the procedure well. Sponge, lap, and needle counts were correct times two. The patient was taken to the recovery in stable condition. Delivery Summary    Patient: Gregory Vera MRN: [id number]  SSN: [id number]    YOB: 1990  Age: 30 y.o. Sex: female        Information for the patient's :  Petr Merino [[id number]]       Labor Events:    Labor: No    Steroids: None   Cervical Ripening Date/Time:       Cervical Ripening Type: Misoprostol   Antibiotics During Labor: Yes   Rupture Identifier:      Rupture Date/Time: 2019 7:30 AM   Rupture Type: AROM   Amniotic Fluid Volume:      Amniotic Fluid Description: Clear    Amniotic Fluid Odor: None    Induction: Prostaglandins       Induction Date/Time:        Indications for Induction:      Augmentation: AROM   Augmentation Date/Time:      Indications for Augmentation: Hypertension   Labor complications: Dysfunctional Labor;Fetal Intolerance; Failure to Progress in First Stage       Additional complications:        Delivery Events:  Indications For Episiotomy:     Episiotomy: None   Perineal Laceration(s): None   Repaired: Periurethral Laceration Location:      Repaired:     Labial Laceration Location:     Repaired:     Sulcal Laceration Location:     Repaired:     Vaginal Laceration Location:     Repaired:     Cervical Laceration Location:     Repaired:     Repair Suture:     Number of Repair Packets:     Estimated Blood Loss (ml): 500ml     Delivery Date: 2019    Delivery Time: 6:40 PM   Delivery Type: , Low Transverse     Details    Trial of Labor: Yes   Primary/Repeat: Primary   Priority: Routine   Indications:  Arrest of Descent; Fetal Intolerance of Labor; Failure to Progress       Sex:  Male     Gestational Age: 44w2d  Delivery Clinician:  Dru Ku  Living Status: Living   Delivery Location: L&D 432          APGARS  One minute Five minutes Ten minutes   Skin color: 1   1        Heart rate: 2   2        Grimace: 2   2        Muscle tone: 2   2        Breathin   2        Totals: 9   9          Presentation: Vertex    Position: Left Occiput Posterior  Resuscitation Method:  Suctioning-bulb; Tactile Stimulation     Meconium Stained: None      Cord Information: 3 Vessels  Complications: Nuchal Cord With Compressions  Cord around: shoulders  Delayed cord clamping? Yes  Cord clamped date/time:2019  6:41 PM  Disposition of Cord Blood: Lab    Blood Gases Sent?: No    Placenta:  Date/Time: 2019  6:42 PM  Removal: Manual Removal      Appearance: Normal     [place] Measurements:  Birth Weight: 3.44 kg      Birth Length: 52 cm      Head Circumference: 36.5 cm      Chest Circumference: 33 cm     Abdominal Girth:       Other Providers:   ATUL RODRÍGUEZ;TK COSTA;SLOANE POON;SHAMIKA RIOS R;ENRIQUE THOMAS;NICOLE MCKEON;RUTHIE SEQUEIRA;GIANLUCA SUAREZ;EMERSON VILLALOBOS, Obstetrician;Primary Nurse;Primary [place] Nurse;Neonatologist;Anesthesiologist;Crna;Scrub Tech;Scrub Tech;Respiratory Therapist             Group B Strep: No results found for: GRBSEXT, GRBSEXT  Information for the patient's :  Matteo Bennett [[id number]]     Lab Results   Component Value Date/Time    ABO/Rh(D) A NEGATIVE 2019 06:40 PM    ANTONIETA IgG NEG 2019 06:40 PM     No results for input(s): PCO2CB, PO2CB, HCO3I, SO2I, IBD, PTEMPI, SPECTI, PHICB, ISITE, IDEV, IALLEN in the last 72 hours.

## 2019-12-18 NOTE — PROGRESS NOTES
Admission assessment complete as noted. Patient oriented to room and unit. Plan of care reviewed and patient verbalizes understanding. Questions encouraged and answered. Patent encouraged to call for needs or concerns. Safety Teaching reviewed:   1. Hand hygiene prior to handling the infant. 2. Bracelets with matching numbers are placed on mother and infant  3. An infant security tag  [facility]) is placed on the infant's ankle and monitored  4. All OB nurses wear pink Employee badges - do not give your baby to anyone without proper identification. 5. Never leave the baby alone in the room. 6. The infant should be placed on their back to sleep. on a firm mattress. No toys should be placed in the crib. (safe sleep video offered to view)  7. Never shake the baby (video offered to view)  8. Infant fall prevention - do not sleep with the baby, and place the baby in the crib while ambulating. 5. Mother and Baby Care booklet given to Mother.

## 2019-12-18 NOTE — ANESTHESIA POSTPROCEDURE EVALUATION
Procedure(s):   SECTION. epidural    Anesthesia Post Evaluation      Multimodal analgesia: multimodal analgesia used between 6 hours prior to anesthesia start to PACU discharge  Patient location during evaluation: bedside  Patient participation: complete - patient participated  Level of consciousness: awake and alert  Pain management: adequate  Airway patency: patent  Anesthetic complications: no  Cardiovascular status: hemodynamically stable  Respiratory status: spontaneous ventilation  Hydration status: euvolemic  Comments: Patient stable and may discharge at this time. Good result with epidural anesthesia, resolving normally. No vitals data found for the desired time range.

## 2019-12-18 NOTE — PROGRESS NOTES
SBAR IN Report: Mother    Verbal report received from Ezequiel De La Cruz RN on this patient, who is now being transferred from labor and delivery for routine progression of care. The patient is wearing a green \"Anesthesia-Duramorph\" band. Report consisted of patient's Situation, Background, Assessment and Recommendations (SBAR). [place] ID bands were compared with the identification form, and verified with the patient and transferring nurse. Information from the SBAR, Kardex, OR Summary, Intake/Output, MAR and Recent Results and the [place] Report was reviewed with the transferring nurse; opportunity for questions and clarification provided.

## 2019-12-18 NOTE — PROGRESS NOTES
Nguyen left in due to blood tinged urine, IV capped, sequential device discontinued. Ambulated to restroom for teaching of hung-care and pad change. Bed linen changed. Returned to bed safely. Tolerated without difficulty.

## 2019-12-19 VITALS
RESPIRATION RATE: 18 BRPM | WEIGHT: 293 LBS | HEART RATE: 98 BPM | SYSTOLIC BLOOD PRESSURE: 151 MMHG | DIASTOLIC BLOOD PRESSURE: 79 MMHG | HEIGHT: 69 IN | OXYGEN SATURATION: 98 % | BODY MASS INDEX: 43.4 KG/M2 | TEMPERATURE: 98.6 F

## 2019-12-19 LAB
ERYTHROCYTE [DISTWIDTH] IN BLOOD BY AUTOMATED COUNT: 13.3 % (ref 11.9–14.6)
HCT VFR BLD AUTO: 27.9 % (ref 35.8–46.3)
HGB BLD-MCNC: 9 G/DL (ref 11.7–15.4)
MCH RBC QN AUTO: 33 PG (ref 26.1–32.9)
MCHC RBC AUTO-ENTMCNC: 32.3 G/DL (ref 31.4–35)
MCV RBC AUTO: 102.2 FL (ref 79.6–97.8)
NRBC # BLD: 0 K/UL (ref 0–0.2)
PLATELET # BLD AUTO: 118 K/UL (ref 150–450)
PMV BLD AUTO: 11.6 FL (ref 9.4–12.3)
RBC # BLD AUTO: 2.73 M/UL (ref 4.05–5.2)
WBC # BLD AUTO: 10.3 K/UL (ref 4.3–11.1)

## 2019-12-19 PROCEDURE — 90715 TDAP VACCINE 7 YRS/> IM: CPT | Performed by: OBSTETRICS & GYNECOLOGY

## 2019-12-19 PROCEDURE — 36415 COLL VENOUS BLD VENIPUNCTURE: CPT

## 2019-12-19 PROCEDURE — 74011250637 HC RX REV CODE- 250/637: Performed by: OBSTETRICS & GYNECOLOGY

## 2019-12-19 PROCEDURE — 74011250636 HC RX REV CODE- 250/636: Performed by: OBSTETRICS & GYNECOLOGY

## 2019-12-19 PROCEDURE — 85027 COMPLETE CBC AUTOMATED: CPT

## 2019-12-19 RX ORDER — IBUPROFEN 800 MG/1
800 TABLET ORAL EVERY 6 HOURS
Qty: 60 TAB | Refills: 1 | Status: SHIPPED | OUTPATIENT
Start: 2019-12-19 | End: 2021-08-24

## 2019-12-19 RX ORDER — OXYCODONE HYDROCHLORIDE 5 MG/1
5 TABLET ORAL
Qty: 30 TAB | Refills: 0 | Status: SHIPPED | OUTPATIENT
Start: 2019-12-19 | End: 2019-12-22

## 2019-12-19 RX ADMIN — IBUPROFEN 800 MG: 800 TABLET, FILM COATED ORAL at 08:36

## 2019-12-19 RX ADMIN — OXYCODONE 5 MG: 5 TABLET ORAL at 04:29

## 2019-12-19 RX ADMIN — SIMETHICONE CHEW TAB 80 MG 80 MG: 80 TABLET ORAL at 10:48

## 2019-12-19 RX ADMIN — DOCUSATE SODIUM 100 MG: 100 CAPSULE, LIQUID FILLED ORAL at 08:36

## 2019-12-19 RX ADMIN — OXYCODONE 5 MG: 5 TABLET ORAL at 10:48

## 2019-12-19 RX ADMIN — IBUPROFEN 800 MG: 800 TABLET, FILM COATED ORAL at 16:40

## 2019-12-19 RX ADMIN — ACETAMINOPHEN 1000 MG: 500 TABLET, FILM COATED ORAL at 04:29

## 2019-12-19 RX ADMIN — ACETAMINOPHEN 1000 MG: 500 TABLET, FILM COATED ORAL at 10:48

## 2019-12-19 RX ADMIN — OXYCODONE 5 MG: 5 TABLET ORAL at 00:31

## 2019-12-19 RX ADMIN — SIMETHICONE CHEW TAB 80 MG 80 MG: 80 TABLET ORAL at 08:36

## 2019-12-19 RX ADMIN — SIMETHICONE CHEW TAB 80 MG 80 MG: 80 TABLET ORAL at 16:40

## 2019-12-19 RX ADMIN — IBUPROFEN 800 MG: 800 TABLET, FILM COATED ORAL at 01:32

## 2019-12-19 RX ADMIN — TETANUS TOXOID, REDUCED DIPHTHERIA TOXOID AND ACELLULAR PERTUSSIS VACCINE, ADSORBED 0.5 ML: 5; 2.5; 8; 8; 2.5 SUSPENSION INTRAMUSCULAR at 09:17

## 2019-12-19 NOTE — LACTATION NOTE
IN to follow up with  Mom and infant prior to discharge to home. Experienced mom stated that infant has latched and nursed. She is also pumping and offering infant formula supplement. Reviewed discharge information as well as gave mom a feeding plan and reviewed that as well. Mom and infant are following up with [facility] and will see lactation consultant there.

## 2019-12-19 NOTE — LACTATION NOTE
Individualized Feeding Plan for Breastfeeding   Lactation Services [phone]      As much as possible, hold your baby on your chest so babys bare skin is against your bare skin with a blanket covering babys back, especially 30 minutes before it is time for baby to eat. Watch for early feeding cues such as, licking lips, sucking motions, rooting, hands to mouth. Crying is a late feeding cue. Feed your baby at least 8 times in 24 hours, or more if your baby is showing feeding cues. If baby is sleepy put baby skin to skin and watch for hunger cues. To rouse baby: unwrap, undress, massage hands, feet, & back, change diaper, gently change babys position from lying to sitting. 15-20 minutes on the first breast of active breastfeeding is considered a good feeding. Good, active breastfeeding is when baby is alert, tugging the nipple, their ear may move, and you can hear swallows. Allow baby to finish the first side before changing sides. Sleeping at the breast or only brief, light sucks should not be considered a good, full breastfeed. At each feeding:  __x__1. Do Suck Practice on finger before each feeding until sucking pattern is smooth. Try using index finger. Nail down towards tongue. __x__2. Hand Express for a few minutes prior to latching to help start milk flow. __x__3. Baby needs to NURSE WELL x 15-20 minutes on at least first breast, burp and offer 2nd breast at every feeding. If no sustained latch only attempt at breast for 10 minutes. If baby does not latch on and feed well on at least one side, you should:   __x__4. Double pump for 15 minutes with breast massage and compression. Hand express for an additional 2-3 minutes per side. Pump after each feeding attempt or poor feeding, up to 8 times per day. If you are not putting baby to the breast you need to pump 8 times a day. Pump every 3 hours. __x__5.  Give baby all of the breast milk you obtain using a straight syringe or  curved syringe. If baby does NOT have enough wet and dirty diapers per day, is jaundiced/lethargic, or has significant weight loss AND you do NOT pump enough milk for each feeding (per volume listed below), formula supplementation may need to be used. Call lactation department /pediatrician if you have concerns. AVERAGE INTAKES OF COLOSTRUM BY HEALTHY  INFANTS:  Time  Day Intake (ml/feed)  Based on 8 feedings per day. 24-48 hrs  2 5-15 ml       Based on every 3 hour feedings  48-72 hrs  3 15-30 ml (0.5-1 oz)  72-96 hrs  4 30-45 ml (1-1.5oz)                           5         45-60 ml (1.5-2oz)                           6         60-75 ml (2-2.5oz)                           7          75-90 ml (2.5-3oz)    By day 7, baby will need 75 ml or 2-2.5 oz at each feeding based on 8 feedings per day & babys weight. (1oz = 30ml). Total milk volume needed in 24 hours by Day 7 is 20.2 oz per day based on baby's birthweight of 7 lbs 9 oz. The more often baby eats, the less volume they need per feeding. If baby is eating more often than the minimum of 8 times per day, they may take less per feeding. Use feeding plan until follow up with pediatrician. Continue to attempt at the breast for most feeds. Pump every 3 hours if no latch. Give all pumped colostrum/breastmilk at each feeding. Mom and infant Outpatient services are located on the 4th floor at [address] [place] [address]. Check in at the 4th floor registration desk (the same one you used when you came to have your baby).   Call for questions [phone]

## 2019-12-19 NOTE — DISCHARGE SUMMARY
Obstetrical Discharge Summary     Name: Ugo Vazquez MRN: [id number]  SSN: [id number]    YOB: 1990  Age: 30 y.o. Sex: female      Allergies: Patient has no known allergies. Admit Date: 12/15/2019    Discharge Date: 2019     Admitting Physician: Zoe Rangel MD     Attending Physician:  Tani Johnston MD     * Admission Diagnoses: 39 weeks gestation of pregnancy [Z3A.39]  Gestational HTN, third trimester [O13.3]  Encounter for planned induction of labor [Z34.90]    * Discharge Diagnoses:   Information for the patient's :  Sully Blankenship [[id number]]   Delivery of a 3.44 kg male infant via , Low Transverse on 2019 at 6:40 PM  by Zoe Rangel. Apgars were 9  and 9 . Additional Diagnoses:   Hospital Problems as of 2019 Date Reviewed: 2019          Codes Class Noted - Resolved POA    39 weeks gestation of pregnancy ICD-10-CM: Z3A.39  ICD-9-CM: V22.2  12/15/2019 - Present Unknown        Gestational HTN, third trimester ICD-10-CM: O13.3  ICD-9-CM: 642.33  12/15/2019 - Present Unknown        Encounter for planned induction of labor ICD-10-CM: Z34.90  ICD-9-CM: V22.1  12/15/2019 - Present Unknown             Lab Results   Component Value Date/Time    ABO/Rh(D) A POSITIVE 12/15/2019 09:01 PM      Immunization History   Administered Date(s) Administered    Influenza Vaccine 2014    Tdap 2019       * Procedures:   Procedure(s):   SECTION           * Discharge Condition: good    * Hospital Course: Normal hospital course following the delivery. * Disposition: Home    Discharge Medications:   Current Discharge Medication List      START taking these medications    Details   ibuprofen (MOTRIN) 800 mg tablet Take 1 Tab by mouth every six (6) hours. Qty: 60 Tab, Refills: 1      oxyCODONE IR (ROXICODONE) 5 mg immediate release tablet Take 1 Tab by mouth every four (4) hours as needed for Pain for up to 3 days.  Max Daily Amount: 30 mg.  Qty: 30 Tab, Refills: 0    Associated Diagnoses: Encounter for planned induction of labor         CONTINUE these medications which have NOT CHANGED    Details   PNV No12-Iron-FA-DSS-OM-3 29 mg iron-1 mg -50 mg CPKD Take  by mouth. * Follow-up Care/Patient Instructions:   Activity: Activity as tolerated and No sex for 6 weeks  Diet: Regular Diet  Wound Care: Keep wound clean and dry    Follow-up Information     Follow up With Specialties Details Why [address] [facility], Wilber Higgins [address] [place] [address]  [phone]

## 2019-12-19 NOTE — DISCHARGE INSTRUCTIONS
Patient Education         Section: What to Expect at Home  Your Recovery    A  section, or , is surgery to deliver your baby through a cut, called an incision, that the doctor makes in your lower belly and uterus. You may have some pain in your lower belly and need pain medicine for 1 to 2 weeks. You can expect some vaginal bleeding for several weeks. You will probably need about 6 weeks to fully recover. It is important to take it easy while the incision is healing. Avoid heavy lifting, strenuous activities, or exercises that strain the belly muscles while you are recovering. Ask a family member or friend for help with housework, cooking, and shopping. This care sheet gives you a general idea about how long it will take for you to recover. But each person recovers at a different pace. Follow the steps below to get better as quickly as possible. How can you care for yourself at home? Activity    · Rest when you feel tired. Getting enough sleep will help you recover.     · Try to walk each day. Start by walking a little more than you did the day before. Bit by bit, increase the amount you walk. Walking boosts blood flow and helps prevent pneumonia, constipation, and blood clots.     · Avoid strenuous activities, such as bicycle riding, jogging, weightlifting, and aerobic exercise, for 6 weeks or until your doctor says it is okay.     · Until your doctor says it is okay, do not lift anything heavier than your baby.     · Do not do sit-ups or other exercises that strain the belly muscles for 6 weeks or until your doctor says it is okay.     · Hold a pillow over your incision when you cough or take deep breaths. This will support your belly and decrease your pain.     · You may shower as usual. Pat the incision dry when you are done.     · You will have some vaginal bleeding. Wear sanitary pads.  Do not douche or use tampons until your doctor says it is okay.     · Ask your doctor when you can drive again.     · You will probably need to take at least 6 weeks off work. It depends on the type of work you do and how you feel.     · Ask your doctor when it is okay for you to have sex. Diet    · You can eat your normal diet. If your stomach is upset, try bland, low-fat foods like plain rice, broiled chicken, toast, and yogurt.     · Drink plenty of fluids (unless your doctor tells you not to).     · You may notice that your bowel movements are not regular right after your surgery. This is common. Try to avoid constipation and straining with bowel movements. You may want to take a fiber supplement every day. If you have not had a bowel movement after a couple of days, ask your doctor about taking a mild laxative.     · If you are breastfeeding, limit alcohol. Alcohol can cause a lack of energy and other health problems for the baby when a breastfeeding woman drinks heavily. It can also get in the way of a mom's ability to feed her baby or to care for the child in other ways. There isn't a lot of research about exactly how much alcohol can harm a baby. Having no alcohol is the safest choice for your baby. If you choose to have a drink now and then, have only one drink, and limit the number of occasions that you have a drink. Wait to breastfeed at least 2 hours after you have a drink to reduce the amount of alcohol the baby may get in the milk. Medicines    · Your doctor will tell you if and when you can restart your medicines. He or she will also give you instructions about taking any new medicines.     · If you take blood thinners, such as warfarin (Coumadin), clopidogrel (Plavix), or aspirin, be sure to talk to your doctor. He or she will tell you if and when to start taking those medicines again. Make sure that you understand exactly what your doctor wants you to do.     · Take pain medicines exactly as directed. ? If the doctor gave you a prescription medicine for pain, take it as prescribed. ?  If you are not taking a prescription pain medicine, ask your doctor if you can take an over-the-counter medicine.     · If you think your pain medicine is making you sick to your stomach:  ? Take your medicine after meals (unless your doctor has told you not to). ? Ask your doctor for a different pain medicine.     · If your doctor prescribed antibiotics, take them as directed. Do not stop taking them just because you feel better. You need to take the full course of antibiotics. Incision care    · If you have strips of tape on the incision, leave the tape on for a week or until it falls off.     · Wash the area daily with warm, soapy water, and pat it dry. Don't use hydrogen peroxide or alcohol, which can slow healing. You may cover the area with a gauze bandage if it weeps or rubs against clothing. Change the bandage every day.     · Keep the area clean and dry. Other instructions    · If you breastfeed your baby, you may be more comfortable while you are healing if you place the baby so that he or she is not resting on your belly. Try tucking your baby under your arm, with his or her body along the side you will be feeding on. Support your baby's upper body with your arm. With that hand you can control your baby's head to bring his or her mouth to your breast. This is sometimes called the football hold. Follow-up care is a key part of your treatment and safety. Be sure to make and go to all appointments, and call your doctor if you are having problems. It's also a good idea to know your test results and keep a list of the medicines you take. When should you call for help? Call 911 anytime you think you may need emergency care.  For example, call if:    · You have thoughts of harming yourself, your baby, or another person.     · You passed out (lost consciousness).     · You have chest pain, are short of breath, or cough up blood.     · You have a seizure.    Call your doctor now or seek immediate medical care if:    · You have pain that does not get better after you take pain medicine.     · You have severe vaginal bleeding.     · You are dizzy or lightheaded, or you feel like you may faint.     · You have new or worse pain in your belly or pelvis.     · You have loose stitches, or your incision comes open.     · You have symptoms of infection, such as:  ? Increased pain, swelling, warmth, or redness. ? Red streaks leading from the incision. ? Pus draining from the incision. ? A fever.     · You have symptoms of a blood clot in your leg (called a deep vein thrombosis), such as:  ? Pain in your calf, back of the knee, thigh, or groin. ? Redness and swelling in your leg or groin.     · You have signs of preeclampsia, such as:  ? Sudden swelling of your face, hands, or feet. ? New vision problems (such as dimness, blurring, or seeing spots). ? A severe headache.    Watch closely for changes in your health, and be sure to contact your doctor if:    · You do not get better as expected. Where can you learn more? Go to http://marielos-jaelyn.info/. Enter M806 in the search box to learn more about \" Section: What to Expect at Home. \"  Current as of: May 29, 2019  Content Version: 12.2  © 5767-3242 [employer]. Care instructions adapted under license by [employer] (which disclaims liability or warranty for this information). If you have questions about a medical condition or this instruction, always ask your healthcare professional. [place] [address] any warranty or liability for your use of this information.

## 2021-05-25 LAB
ANTIBODY SCREEN, EXTERNAL: NORMAL
HBSAG, EXTERNAL: NORMAL
HEPATITIS C AB,   EXT: NORMAL
HIV, EXTERNAL: NORMAL
RPR, EXTERNAL: NORMAL
RUBELLA, EXTERNAL: NORMAL
TYPE, ABO & RH, EXTERNAL: NORMAL

## 2021-08-24 PROBLEM — Z3A.19 19 WEEKS GESTATION OF PREGNANCY: Status: ACTIVE | Noted: 2021-08-24

## 2021-08-24 PROBLEM — O34.29 UTERINE SCAR FROM PREVIOUS SURGERY AFFECTING PREGNANCY: Status: ACTIVE | Noted: 2021-08-02

## 2021-08-24 PROBLEM — Z34.90 ENCOUNTER FOR PLANNED INDUCTION OF LABOR: Status: RESOLVED | Noted: 2019-12-15 | Resolved: 2021-08-24

## 2021-08-24 PROBLEM — O26.90 DISORDER OF PREGNANCY: Status: ACTIVE | Noted: 2021-07-28

## 2021-08-24 PROBLEM — O24.410 DIET CONTROLLED GESTATIONAL DIABETES MELLITUS (GDM) IN SECOND TRIMESTER: Status: ACTIVE | Noted: 2021-08-24

## 2021-08-24 PROBLEM — Z87.59 HISTORY OF GESTATIONAL HYPERTENSION: Status: ACTIVE | Noted: 2019-12-15

## 2021-08-24 PROBLEM — Z3A.39 39 WEEKS GESTATION OF PREGNANCY: Status: RESOLVED | Noted: 2019-12-15 | Resolved: 2021-08-24

## 2021-08-24 PROBLEM — E28.2 POLYCYSTIC OVARY SYNDROME: Status: ACTIVE | Noted: 2020-09-02

## 2021-08-24 PROBLEM — O26.90 DISORDER OF PREGNANCY: Status: RESOLVED | Noted: 2021-07-28 | Resolved: 2021-08-24

## 2021-08-24 PROBLEM — O09.92 HIGH-RISK PREGNANCY IN SECOND TRIMESTER: Status: ACTIVE | Noted: 2021-08-24

## 2021-08-24 PROBLEM — O99.212 OBESITY AFFECTING PREGNANCY IN SECOND TRIMESTER: Status: ACTIVE | Noted: 2021-08-24

## 2021-08-25 PROBLEM — O24.912 DIABETES MELLITUS AFFECTING PREGNANCY IN SECOND TRIMESTER: Status: ACTIVE | Noted: 2021-08-24

## 2021-08-25 PROBLEM — O34.219 H/O CESAREAN SECTION COMPLICATING PREGNANCY: Status: ACTIVE | Noted: 2021-08-02

## 2021-08-25 PROBLEM — O24.414 INSULIN CONTROLLED GESTATIONAL DIABETES MELLITUS (GDM) IN SECOND TRIMESTER: Status: ACTIVE | Noted: 2021-08-24

## 2021-08-25 PROBLEM — O34.80 POLYCYSTIC OVARY AFFECTING PREGNANCY, ANTEPARTUM: Status: ACTIVE | Noted: 2020-09-02

## 2021-08-25 PROBLEM — O10.919 CHRONIC HYPERTENSION AFFECTING PREGNANCY: Status: ACTIVE | Noted: 2021-08-25

## 2021-10-07 PROBLEM — O35.EXX0 HYDRONEPHROSIS OF FETUS IN SINGLETON PREGNANCY, ANTEPARTUM: Status: ACTIVE | Noted: 2021-10-07

## 2021-11-03 PROBLEM — O09.93 HIGH-RISK PREGNANCY IN THIRD TRIMESTER: Status: ACTIVE | Noted: 2021-08-24

## 2021-11-03 PROBLEM — O99.213 OBESITY AFFECTING PREGNANCY IN THIRD TRIMESTER: Status: ACTIVE | Noted: 2021-08-24

## 2021-11-03 PROBLEM — Z87.59 HISTORY OF GESTATIONAL HYPERTENSION: Status: RESOLVED | Noted: 2019-12-15 | Resolved: 2021-11-03

## 2021-12-02 PROBLEM — O09.893 NONCOMPLIANT PREGNANT PATIENT IN THIRD TRIMESTER: Status: ACTIVE | Noted: 2021-12-02

## 2021-12-02 PROBLEM — Z91.199 NONCOMPLIANT PREGNANT PATIENT IN THIRD TRIMESTER: Status: ACTIVE | Noted: 2021-12-02

## 2021-12-21 ENCOUNTER — ANESTHESIA EVENT (OUTPATIENT)
Dept: LABOR AND DELIVERY | Age: 31
End: 2021-12-21
Payer: COMMERCIAL

## 2021-12-27 ENCOUNTER — HOSPITAL ENCOUNTER (INPATIENT)
Age: 31
LOS: 2 days | Discharge: HOME | End: 2021-12-29
Attending: OBSTETRICS & GYNECOLOGY | Admitting: OBSTETRICS & GYNECOLOGY
Payer: COMMERCIAL

## 2021-12-27 ENCOUNTER — ANESTHESIA (OUTPATIENT)
Dept: LABOR AND DELIVERY | Age: 31
End: 2021-12-27
Payer: COMMERCIAL

## 2021-12-27 DIAGNOSIS — L76.82 PAIN AT SURGICAL INCISION: Primary | ICD-10-CM

## 2021-12-27 PROBLEM — Z3A.37 37 WEEKS GESTATION OF PREGNANCY: Status: ACTIVE | Noted: 2021-12-27

## 2021-12-27 LAB
ABO + RH BLD: NORMAL
ALBUMIN SERPL-MCNC: 3.1 G/DL (ref 3.5–5)
ALBUMIN/GLOB SERPL: 0.8 {RATIO} (ref 1.2–3.5)
ALP SERPL-CCNC: 140 U/L (ref 50–136)
ALT SERPL-CCNC: 24 U/L (ref 12–65)
ANION GAP SERPL CALC-SCNC: 7 MMOL/L (ref 7–16)
AST SERPL-CCNC: 18 U/L (ref 15–37)
BASOPHILS # BLD: 0 K/UL (ref 0–0.2)
BASOPHILS NFR BLD: 0 % (ref 0–2)
BILIRUB SERPL-MCNC: 0.5 MG/DL (ref 0.2–1.1)
BLOOD GROUP ANTIBODIES SERPL: NORMAL
BUN SERPL-MCNC: 5 MG/DL (ref 6–23)
CALCIUM SERPL-MCNC: 8 MG/DL (ref 8.3–10.4)
CHLORIDE SERPL-SCNC: 109 MMOL/L (ref 98–107)
CO2 SERPL-SCNC: 22 MMOL/L (ref 21–32)
CREAT SERPL-MCNC: 0.61 MG/DL (ref 0.6–1)
DIFFERENTIAL METHOD BLD: ABNORMAL
EOSINOPHIL # BLD: 0.1 K/UL (ref 0–0.8)
EOSINOPHIL NFR BLD: 2 % (ref 0.5–7.8)
ERYTHROCYTE [DISTWIDTH] IN BLOOD BY AUTOMATED COUNT: 13.5 % (ref 11.9–14.6)
GLOBULIN SER CALC-MCNC: 4.1 G/DL (ref 2.3–3.5)
GLUCOSE BLD STRIP.AUTO-MCNC: 110 MG/DL (ref 65–100)
GLUCOSE SERPL-MCNC: 103 MG/DL (ref 65–100)
HCT VFR BLD AUTO: 35 % (ref 35.8–46.3)
HGB BLD-MCNC: 11.3 G/DL (ref 11.7–15.4)
IMM GRANULOCYTES # BLD AUTO: 0 K/UL (ref 0–0.5)
IMM GRANULOCYTES NFR BLD AUTO: 0 % (ref 0–5)
LYMPHOCYTES # BLD: 2 K/UL (ref 0.5–4.6)
LYMPHOCYTES NFR BLD: 30 % (ref 13–44)
MCH RBC QN AUTO: 31.2 PG (ref 26.1–32.9)
MCHC RBC AUTO-ENTMCNC: 32.3 G/DL (ref 31.4–35)
MCV RBC AUTO: 96.7 FL (ref 79.6–97.8)
MONOCYTES # BLD: 0.7 K/UL (ref 0.1–1.3)
MONOCYTES NFR BLD: 11 % (ref 4–12)
NEUTS SEG # BLD: 3.8 K/UL (ref 1.7–8.2)
NEUTS SEG NFR BLD: 57 % (ref 43–78)
NRBC # BLD: 0 K/UL (ref 0–0.2)
PLATELET # BLD AUTO: 182 K/UL (ref 150–450)
PMV BLD AUTO: 12 FL (ref 9.4–12.3)
POTASSIUM SERPL-SCNC: 3.6 MMOL/L (ref 3.5–5.1)
PROT SERPL-MCNC: 7.2 G/DL (ref 6.3–8.2)
RBC # BLD AUTO: 3.62 M/UL (ref 4.05–5.2)
SERVICE CMNT-IMP: ABNORMAL
SODIUM SERPL-SCNC: 138 MMOL/L (ref 136–145)
SPECIMEN EXP DATE BLD: NORMAL
WBC # BLD AUTO: 6.7 K/UL (ref 4.3–11.1)

## 2021-12-27 PROCEDURE — 74011000250 HC RX REV CODE- 250: Performed by: OBSTETRICS & GYNECOLOGY

## 2021-12-27 PROCEDURE — 10907ZC DRAINAGE OF AMNIOTIC FLUID, THERAPEUTIC FROM PRODUCTS OF CONCEPTION, VIA NATURAL OR ARTIFICIAL OPENING: ICD-10-PCS | Performed by: OBSTETRICS & GYNECOLOGY

## 2021-12-27 PROCEDURE — 74011250636 HC RX REV CODE- 250/636: Performed by: OBSTETRICS & GYNECOLOGY

## 2021-12-27 PROCEDURE — 80053 COMPREHEN METABOLIC PANEL: CPT

## 2021-12-27 PROCEDURE — 74011250636 HC RX REV CODE- 250/636: Performed by: ANESTHESIOLOGY

## 2021-12-27 PROCEDURE — 77030032490 HC SLV COMPR SCD KNE COVD -B: Performed by: OBSTETRICS & GYNECOLOGY

## 2021-12-27 PROCEDURE — 76010000392 HC C SECN EA ADDL 0.5 HR: Performed by: OBSTETRICS & GYNECOLOGY

## 2021-12-27 PROCEDURE — 77030040361 HC SLV COMPR DVT MDII -B

## 2021-12-27 PROCEDURE — 77030002974 HC SUT PLN J&J -A: Performed by: OBSTETRICS & GYNECOLOGY

## 2021-12-27 PROCEDURE — 65270000029 HC RM PRIVATE

## 2021-12-27 PROCEDURE — 2709999900 HC NON-CHARGEABLE SUPPLY

## 2021-12-27 PROCEDURE — 74011250637 HC RX REV CODE- 250/637: Performed by: STUDENT IN AN ORGANIZED HEALTH CARE EDUCATION/TRAINING PROGRAM

## 2021-12-27 PROCEDURE — 77030007880 HC KT SPN EPDRL BBMI -B: Performed by: STUDENT IN AN ORGANIZED HEALTH CARE EDUCATION/TRAINING PROGRAM

## 2021-12-27 PROCEDURE — C1765 ADHESION BARRIER: HCPCS | Performed by: OBSTETRICS & GYNECOLOGY

## 2021-12-27 PROCEDURE — 77030011943: Performed by: OBSTETRICS & GYNECOLOGY

## 2021-12-27 PROCEDURE — 10D17Z9 MANUAL EXTRACTION OF PRODUCTS OF CONCEPTION, RETAINED, VIA NATURAL OR ARTIFICIAL OPENING: ICD-10-PCS | Performed by: OBSTETRICS & GYNECOLOGY

## 2021-12-27 PROCEDURE — 74011000250 HC RX REV CODE- 250: Performed by: STUDENT IN AN ORGANIZED HEALTH CARE EDUCATION/TRAINING PROGRAM

## 2021-12-27 PROCEDURE — 77030040830 HC CATH URETH FOL MDII -A

## 2021-12-27 PROCEDURE — 74011250636 HC RX REV CODE- 250/636: Performed by: STUDENT IN AN ORGANIZED HEALTH CARE EDUCATION/TRAINING PROGRAM

## 2021-12-27 PROCEDURE — 77030033542 HC RETRCTR RETNTS PANICLS GQSM -B: Performed by: OBSTETRICS & GYNECOLOGY

## 2021-12-27 PROCEDURE — 77030031139 HC SUT VCRL2 J&J -A: Performed by: OBSTETRICS & GYNECOLOGY

## 2021-12-27 PROCEDURE — 76060000078 HC EPIDURAL ANESTHESIA: Performed by: OBSTETRICS & GYNECOLOGY

## 2021-12-27 PROCEDURE — 82962 GLUCOSE BLOOD TEST: CPT

## 2021-12-27 PROCEDURE — 2709999900 HC NON-CHARGEABLE SUPPLY: Performed by: OBSTETRICS & GYNECOLOGY

## 2021-12-27 PROCEDURE — 77030018836 HC SOL IRR NACL ICUM -A: Performed by: OBSTETRICS & GYNECOLOGY

## 2021-12-27 PROCEDURE — 74011000250 HC RX REV CODE- 250: Performed by: NURSE ANESTHETIST, CERTIFIED REGISTERED

## 2021-12-27 PROCEDURE — 75410000003 HC RECOV DEL/VAG/CSECN EA 0.5 HR: Performed by: OBSTETRICS & GYNECOLOGY

## 2021-12-27 PROCEDURE — 74011250636 HC RX REV CODE- 250/636: Performed by: NURSE ANESTHETIST, CERTIFIED REGISTERED

## 2021-12-27 PROCEDURE — 77030003665 HC NDL SPN BBMI -A: Performed by: STUDENT IN AN ORGANIZED HEALTH CARE EDUCATION/TRAINING PROGRAM

## 2021-12-27 PROCEDURE — 86901 BLOOD TYPING SEROLOGIC RH(D): CPT

## 2021-12-27 PROCEDURE — 76010000391 HC C SECN FIRST 1 HR: Performed by: OBSTETRICS & GYNECOLOGY

## 2021-12-27 PROCEDURE — 85025 COMPLETE CBC W/AUTO DIFF WBC: CPT

## 2021-12-27 RX ORDER — DIPHENHYDRAMINE HYDROCHLORIDE 50 MG/ML
12.5 INJECTION, SOLUTION INTRAMUSCULAR; INTRAVENOUS
Status: DISCONTINUED | OUTPATIENT
Start: 2021-12-27 | End: 2021-12-28 | Stop reason: SDUPTHER

## 2021-12-27 RX ORDER — NALOXONE HYDROCHLORIDE 0.4 MG/ML
0.2 INJECTION, SOLUTION INTRAMUSCULAR; INTRAVENOUS; SUBCUTANEOUS
Status: DISCONTINUED | OUTPATIENT
Start: 2021-12-27 | End: 2021-12-28 | Stop reason: SDUPTHER

## 2021-12-27 RX ORDER — OXYTOCIN/RINGER'S LACTATE 30/500 ML
10 PLASTIC BAG, INJECTION (ML) INTRAVENOUS AS NEEDED
Status: DISCONTINUED | OUTPATIENT
Start: 2021-12-27 | End: 2021-12-27

## 2021-12-27 RX ORDER — ONDANSETRON 2 MG/ML
INJECTION INTRAMUSCULAR; INTRAVENOUS AS NEEDED
Status: DISCONTINUED | OUTPATIENT
Start: 2021-12-27 | End: 2021-12-27 | Stop reason: HOSPADM

## 2021-12-27 RX ORDER — SODIUM CHLORIDE, SODIUM LACTATE, POTASSIUM CHLORIDE, CALCIUM CHLORIDE 600; 310; 30; 20 MG/100ML; MG/100ML; MG/100ML; MG/100ML
125 INJECTION, SOLUTION INTRAVENOUS CONTINUOUS
Status: DISCONTINUED | OUTPATIENT
Start: 2021-12-27 | End: 2021-12-29 | Stop reason: HOSPADM

## 2021-12-27 RX ORDER — SODIUM CHLORIDE 0.9 % (FLUSH) 0.9 %
5-40 SYRINGE (ML) INJECTION AS NEEDED
Status: DISCONTINUED | OUTPATIENT
Start: 2021-12-27 | End: 2021-12-27 | Stop reason: HOSPADM

## 2021-12-27 RX ORDER — FAMOTIDINE 20 MG/1
20 TABLET, FILM COATED ORAL ONCE
Status: COMPLETED | OUTPATIENT
Start: 2021-12-27 | End: 2021-12-27

## 2021-12-27 RX ORDER — OXYTOCIN/RINGER'S LACTATE 30/500 ML
87.3 PLASTIC BAG, INJECTION (ML) INTRAVENOUS AS NEEDED
Status: DISCONTINUED | OUTPATIENT
Start: 2021-12-27 | End: 2021-12-27

## 2021-12-27 RX ORDER — KETOROLAC TROMETHAMINE 30 MG/ML
30 INJECTION, SOLUTION INTRAMUSCULAR; INTRAVENOUS
Status: DISCONTINUED | OUTPATIENT
Start: 2021-12-27 | End: 2021-12-28 | Stop reason: ALTCHOICE

## 2021-12-27 RX ORDER — OXYTOCIN/RINGER'S LACTATE 30/500 ML
PLASTIC BAG, INJECTION (ML) INTRAVENOUS
Status: DISCONTINUED | OUTPATIENT
Start: 2021-12-27 | End: 2021-12-27 | Stop reason: HOSPADM

## 2021-12-27 RX ORDER — FENTANYL CITRATE 50 UG/ML
INJECTION, SOLUTION INTRAMUSCULAR; INTRAVENOUS AS NEEDED
Status: DISCONTINUED | OUTPATIENT
Start: 2021-12-27 | End: 2021-12-27 | Stop reason: HOSPADM

## 2021-12-27 RX ORDER — EPHEDRINE SULFATE/0.9% NACL/PF 50 MG/5 ML
SYRINGE (ML) INTRAVENOUS AS NEEDED
Status: DISCONTINUED | OUTPATIENT
Start: 2021-12-27 | End: 2021-12-27 | Stop reason: HOSPADM

## 2021-12-27 RX ORDER — SODIUM CHLORIDE, SODIUM LACTATE, POTASSIUM CHLORIDE, CALCIUM CHLORIDE 600; 310; 30; 20 MG/100ML; MG/100ML; MG/100ML; MG/100ML
50 INJECTION, SOLUTION INTRAVENOUS CONTINUOUS
Status: DISCONTINUED | OUTPATIENT
Start: 2021-12-27 | End: 2021-12-27 | Stop reason: HOSPADM

## 2021-12-27 RX ORDER — ACETAMINOPHEN 325 MG/1
650 TABLET ORAL EVERY 6 HOURS
Status: DISCONTINUED | OUTPATIENT
Start: 2021-12-27 | End: 2021-12-28 | Stop reason: ALTCHOICE

## 2021-12-27 RX ORDER — SODIUM CHLORIDE, SODIUM LACTATE, POTASSIUM CHLORIDE, CALCIUM CHLORIDE 600; 310; 30; 20 MG/100ML; MG/100ML; MG/100ML; MG/100ML
100 INJECTION, SOLUTION INTRAVENOUS CONTINUOUS
Status: DISCONTINUED | OUTPATIENT
Start: 2021-12-27 | End: 2021-12-29 | Stop reason: HOSPADM

## 2021-12-27 RX ORDER — OXYCODONE HYDROCHLORIDE 5 MG/1
10 TABLET ORAL
Status: DISPENSED | OUTPATIENT
Start: 2021-12-27 | End: 2021-12-28

## 2021-12-27 RX ORDER — SODIUM CHLORIDE 0.9 % (FLUSH) 0.9 %
5-40 SYRINGE (ML) INJECTION EVERY 8 HOURS
Status: DISCONTINUED | OUTPATIENT
Start: 2021-12-27 | End: 2021-12-27 | Stop reason: HOSPADM

## 2021-12-27 RX ORDER — HYDROMORPHONE HYDROCHLORIDE 1 MG/ML
0.5 INJECTION, SOLUTION INTRAMUSCULAR; INTRAVENOUS; SUBCUTANEOUS
Status: DISCONTINUED | OUTPATIENT
Start: 2021-12-27 | End: 2021-12-28 | Stop reason: ALTCHOICE

## 2021-12-27 RX ORDER — BUPIVACAINE HYDROCHLORIDE 7.5 MG/ML
INJECTION, SOLUTION INTRASPINAL
Status: COMPLETED | OUTPATIENT
Start: 2021-12-27 | End: 2021-12-27

## 2021-12-27 RX ORDER — SODIUM CHLORIDE, SODIUM LACTATE, POTASSIUM CHLORIDE, CALCIUM CHLORIDE 600; 310; 30; 20 MG/100ML; MG/100ML; MG/100ML; MG/100ML
INJECTION, SOLUTION INTRAVENOUS
Status: DISCONTINUED | OUTPATIENT
Start: 2021-12-27 | End: 2021-12-27 | Stop reason: HOSPADM

## 2021-12-27 RX ORDER — MORPHINE SULFATE 0.5 MG/ML
INJECTION, SOLUTION EPIDURAL; INTRATHECAL; INTRAVENOUS
Status: COMPLETED | OUTPATIENT
Start: 2021-12-27 | End: 2021-12-27

## 2021-12-27 RX ORDER — KETOROLAC TROMETHAMINE 30 MG/ML
INJECTION, SOLUTION INTRAMUSCULAR; INTRAVENOUS AS NEEDED
Status: DISCONTINUED | OUTPATIENT
Start: 2021-12-27 | End: 2021-12-27 | Stop reason: HOSPADM

## 2021-12-27 RX ORDER — TRISODIUM CITRATE DIHYDRATE AND CITRIC ACID MONOHYDRATE 500; 334 MG/5ML; MG/5ML
30 SOLUTION ORAL ONCE
Status: COMPLETED | OUTPATIENT
Start: 2021-12-27 | End: 2021-12-27

## 2021-12-27 RX ADMIN — SODIUM CITRATE AND CITRIC ACID MONOHYDRATE 30 ML: 500; 334 SOLUTION ORAL at 09:01

## 2021-12-27 RX ADMIN — KETOROLAC TROMETHAMINE 30 MG: 30 INJECTION, SOLUTION INTRAMUSCULAR; INTRAVENOUS at 17:58

## 2021-12-27 RX ADMIN — ACETAMINOPHEN 650 MG: 325 TABLET, FILM COATED ORAL at 12:54

## 2021-12-27 RX ADMIN — SODIUM CHLORIDE, SODIUM LACTATE, POTASSIUM CHLORIDE, AND CALCIUM CHLORIDE 50 ML/HR: 600; 310; 30; 20 INJECTION, SOLUTION INTRAVENOUS at 07:30

## 2021-12-27 RX ADMIN — HYDROMORPHONE HYDROCHLORIDE 0.5 MG: 1 INJECTION, SOLUTION INTRAMUSCULAR; INTRAVENOUS; SUBCUTANEOUS at 12:54

## 2021-12-27 RX ADMIN — BUPIVACAINE HYDROCHLORIDE IN DEXTROSE 13.5 MG: 7.5 INJECTION, SOLUTION SUBARACHNOID at 10:10

## 2021-12-27 RX ADMIN — PHENYLEPHRINE HYDROCHLORIDE 100 MCG: 10 INJECTION INTRAVENOUS at 10:32

## 2021-12-27 RX ADMIN — FENTANYL CITRATE 50 MCG: 50 INJECTION INTRAMUSCULAR; INTRAVENOUS at 10:53

## 2021-12-27 RX ADMIN — Medication 10 MG: at 10:20

## 2021-12-27 RX ADMIN — PHENYLEPHRINE HYDROCHLORIDE 100 MCG: 10 INJECTION INTRAVENOUS at 10:21

## 2021-12-27 RX ADMIN — ACETAMINOPHEN 650 MG: 325 TABLET, FILM COATED ORAL at 20:38

## 2021-12-27 RX ADMIN — CEFAZOLIN SODIUM 3 G: 10 INJECTION, POWDER, FOR SOLUTION INTRAVENOUS at 09:01

## 2021-12-27 RX ADMIN — PHENYLEPHRINE HYDROCHLORIDE 100 MCG: 10 INJECTION INTRAVENOUS at 10:15

## 2021-12-27 RX ADMIN — Medication 10 MG: at 10:30

## 2021-12-27 RX ADMIN — SODIUM CHLORIDE, SODIUM LACTATE, POTASSIUM CHLORIDE, AND CALCIUM CHLORIDE 250 ML: 600; 310; 30; 20 INJECTION, SOLUTION INTRAVENOUS at 23:28

## 2021-12-27 RX ADMIN — PHENYLEPHRINE HYDROCHLORIDE 50 MCG: 10 INJECTION INTRAVENOUS at 10:10

## 2021-12-27 RX ADMIN — Medication 500 ML/HR: at 10:44

## 2021-12-27 RX ADMIN — PHENYLEPHRINE HYDROCHLORIDE 100 MCG: 10 INJECTION INTRAVENOUS at 10:23

## 2021-12-27 RX ADMIN — PHENYLEPHRINE HYDROCHLORIDE 100 MCG: 10 INJECTION INTRAVENOUS at 10:18

## 2021-12-27 RX ADMIN — SODIUM CHLORIDE, SODIUM LACTATE, POTASSIUM CHLORIDE, AND CALCIUM CHLORIDE: 600; 310; 30; 20 INJECTION, SOLUTION INTRAVENOUS at 09:50

## 2021-12-27 RX ADMIN — ONDANSETRON 4 MG: 2 INJECTION INTRAMUSCULAR; INTRAVENOUS at 09:54

## 2021-12-27 RX ADMIN — Medication 5 MG: at 10:16

## 2021-12-27 RX ADMIN — OXYCODONE 10 MG: 5 TABLET ORAL at 18:20

## 2021-12-27 RX ADMIN — FAMOTIDINE 20 MG: 20 TABLET ORAL at 09:01

## 2021-12-27 RX ADMIN — PHENYLEPHRINE HYDROCHLORIDE 50 MCG: 10 INJECTION INTRAVENOUS at 10:12

## 2021-12-27 RX ADMIN — KETOROLAC TROMETHAMINE 30 MG: 30 INJECTION, SOLUTION INTRAMUSCULAR; INTRAVENOUS at 11:13

## 2021-12-27 RX ADMIN — MORPHINE SULFATE 0.15 MG: 0.5 INJECTION, SOLUTION EPIDURAL; INTRATHECAL; INTRAVENOUS at 10:10

## 2021-12-27 RX ADMIN — PHENYLEPHRINE HYDROCHLORIDE 100 MCG: 10 INJECTION INTRAVENOUS at 10:36

## 2021-12-27 RX ADMIN — FENTANYL CITRATE 50 MCG: 50 INJECTION INTRAMUSCULAR; INTRAVENOUS at 11:11

## 2021-12-27 NOTE — PROGRESS NOTES
SBAR IN Report: Mother    Verbal report received from Kirk Pretty RN (full name & credentials) on this patient, who is now being transferred from L&D (unit) for routine progression of care. The patient is wearing a green \"Anesthesia-Duramorph\" band. Report consisted of patient's Situation, Background, Assessment and Recommendations (SBAR). [place] ID bands were compared with the identification form, and verified with the patient and transferring nurse. Information from the SBAR and Procedure Summary and the [place] Report was reviewed with the transferring nurse; opportunity for questions and clarification provided.

## 2021-12-27 NOTE — H&P
History & Physical    Name: Rene Vargas MRN: [id number]  SSN: [id number]    YOB: 1990  Age: 33 y.o. Sex: female      Subjective:     Estimated Date of Delivery: 22  OB History    Para Term  AB Living   5 2 2   2 2   SAB IAB Ectopic Molar Multiple Live Births           0 1      # Outcome Date GA Lbr Shaka/2nd Weight Sex Delivery Anes PTL Lv   5 Current            4 Term 19 39w2d  3.44 kg M CS-LTranv EPI N PRAKASH      Complications: Dysfunctional Labor, Fetal Intolerance, Failure to Progress in First Stage   3 Term 08 40w0d  4.961 kg F Vag-Spont None N    2 AB            1 AB                Ms. Jose Rutherford admitted with pregnancy at 37w1d for  section due to previous  section. Prenatal course was complicated by chronic hypertension and diabetes - gestational. Please see prenatal records for details.     Past Medical History:   Diagnosis Date    39 weeks gestation of pregnancy 12/15/2019    Diet controlled gestational diabetes mellitus (GDM) in second trimester 2021    Disorder of pregnancy 2021    Encounter for planned induction of labor 12/15/2019    Gestational hypertension     History of elective  8/1/2016    x2    Hydronephrosis of fetus in [place] pregnancy, antepartum 10/7/2021    Hypertension     PCOS (polycystic ovarian syndrome)      Past Surgical History:   Procedure Laterality Date    HX OTHER SURGICAL  2014    EAB x2    HX TONSILLECTOMY  2010    HX WISDOM TEETH EXTRACTION       Social History     Occupational History    Not on file   Tobacco Use    Smoking status: Never Smoker    Smokeless tobacco: Never Used   Substance and Sexual Activity    Alcohol use: No    Drug use: No    Sexual activity: Yes     Partners: Male     Birth control/protection: None     Comment: trying to conceive     Family History   Problem Relation Age of Onset    Hypertension Maternal Grandmother        No Known Allergies  Prior to Admission medications    Medication Sig Start Date End Date Taking? Authorizing Provider   calcium carbonate (TUMS) 200 mg calcium (500 mg) chew Take 1 Tablet by mouth daily. Provider, Historical   labetaloL (NORMODYNE) 200 mg tablet Take 1 Tablet by mouth two (2) times a day. Indications: high blood pressure  Patient taking differently: Take 200 mg by mouth two (2) times a day. 200 mg in the AM  Indications: high blood pressure 11/3/21   Yessenia Bagley MD   omeprazole (PRILOSEC) 40 mg capsule Take 1 Capsule by mouth daily. Indications: heartburn 11/3/21   Yessenia Bagley MD   insulin detemir U-100 (Levemir FlexTouch U-100 Insuln) 100 unit/mL (3 mL) inpn 6-25 units as directed twice daily; May substitute Lantus, Basaglar or Semglee  Indications: diabetes during pregnancy 8/25/21   Emir Ray MD   pen needle, diabetic (NovoFine Plus) 32 gauge x 1/6\" ndle 1 Box by Does Not Apply route two (2) times daily (with meals). 8/25/21   Emir Ray MD   lancets (OneTouch Delica Plus Lancet) 30 gauge misc OneTouch Ultra Test strips   USE AS DIRECTED. 4 times a day    Provider, Historical   docosahexaenoic acid (Prenatal DHA) 200 mg cap capsule Prenatal + DHA   1 po q d    Provider, Historical        Review of Systems: A comprehensive review of systems was negative except for that written in the History of Present Illness. Objective:     Vitals: There were no vitals filed for this visit. Physical Exam:  Patient without distress.   Heart: Regular rate and rhythm  Lung: clear to auscultation throughout lung fields, no wheezes, no rales, no rhonchi and normal respiratory effort  Abdomen: soft, nontender  Fundus: soft and non tender  Membranes:  Intact  Fetal Heart Rate: Reactive    Prenatal Labs:   Lab Results   Component Value Date/Time    ABO/Rh(D) A POSITIVE 12/15/2019 09:01 PM    Rubella, External IMM 05/25/2021 12:00 AM    HBsAg, External NR 05/25/2021 12:00 AM    HIV, External NEG 05/25/2021 12:00 AM RPR, External NR 2021 12:00 AM    ABO,Rh A POS 2021 12:00 AM         Impression/Plan:     Plan:  Admit for  section. Discussed the risks of surgery including the risks of bleeding, infection, deep vein thrombosis, and surgical injuries to internal organs including but not limited to the bowels, bladder, rectum, and female reproductive organs. The patient understands the risks; any and all questions were answered to the patient's satisfaction.

## 2021-12-27 NOTE — ANESTHESIA PREPROCEDURE EVALUATION
Relevant Problems   CARDIOVASCULAR   (+) Chronic hypertension affecting pregnancy      RENAL FAILURE   (+) Hydronephrosis of fetus in [place] pregnancy, antepartum      ENDOCRINE   (+) Insulin controlled gestational diabetes mellitus (GDM) in third trimester   (+) Obesity affecting pregnancy in third trimester       Anesthetic History   No history of anesthetic complications            Review of Systems / Medical History  Patient summary reviewed, nursing notes reviewed and pertinent labs reviewed    Pulmonary  Within defined limits                 Neuro/Psych   Within defined limits           Cardiovascular    Hypertension              Exercise tolerance: >4 METS     GI/Hepatic/Renal     GERD: well controlled           Endo/Other    Diabetes (gdm)    Morbid obesity     Other Findings              Physical Exam    Airway  Mallampati: II  TM Distance: 4 - 6 cm  Neck ROM: normal range of motion   Mouth opening: Normal     Cardiovascular  Regular rate and rhythm,  S1 and S2 normal,  no murmur, click, rub, or gallop             Dental  No notable dental hx       Pulmonary  Breath sounds clear to auscultation               Abdominal         Other Findings            Anesthetic Plan    ASA: 3  Anesthesia type: spinal      Post-op pain plan if not by surgeon: intrathecal opiates      Anesthetic plan and risks discussed with: Patient and Spouse

## 2021-12-27 NOTE — L&D DELIVERY NOTE
LOW TRANSVERSE  SECTION   FULL OP NOTE    PATIENT: Opal Macias  MRN: [id number]      PREOPERATIVE DIAGNOSIS: History of  [Z98.891], CHTN, GDM- insulin    POSTOPERATIVE DIAGNOSIS: Same with viable female    PROCEDURE: Repeat Low transverse  section    SURGEON: Bolivar Long MD    ASSISTANT:none    ANESTHESIA: Spinal    ESTIMATED BLOOD LOSS: see QBL    SPECIMENS: na    PROCEDURE IN DETAIL:     The patient was taken to the operating room where spinal anesthesia was found to be adequate. Time out was done to confirm the operating procedure, surgeon, patient and site. Once confirmed by the team, procedure was started. The patient was prepped and draped in the normal sterile fashion. A Pfannenstiel skin incision was made with a scalpel and carried down to the underlying fascia. The fascial incision was extended laterally with Vasquez scissors. The superior edge of the fascial incision was grasped with Kocher clamps, tented up, and the underlying rectus muscle dissected off sharply and bluntly. The fascia was extremely scarred down and difficult to dissect from the muscle. The inferior edge of the rectus fascia was grasped with Kocher clamps, tented up, and the underlying rectus muscle dissected off bluntly. The rectus muscles were divided in the midline bluntly. The peritoneum was entered bluntly and extended superiorly and inferiorly with the Metzenbaum scissors. A bladder blade was then inserted. The vesicouterine peritoneum was identified, grasped with pickups and entered sharply with the Metzenbaum scissors and extended laterally. A low transverse uterine incision was made with the scalpel and extended laterally with blunt finger dissection. The babys head was then delivered atraumatically. The nose and mouth were suctioned. The cord was clamped and cut. The baby was handed off to the awaiting  Intensive Care Unit staff. The placenta was then delivered spontaneously.  The uterus was exteriorized and the uterus was cleared of all clots and debris. The uterine incision was closed in two layers; the first layer was a running locked layer of 0 Vicryl and the second layer was an imbricating layer of 0 Vicryl. Good hemostasis was assured. Good hemostasis was assured. Paracolic gutters were washed with warm normal saline. The uterus was returned to the abdomen. A piece of Interceed was placed over the uterine incision and the peritoneum was then closed with 2-0 Vicryl in a running fashion. The fascia was closed with 0-Vicryl in a running fashion. Good hemostasis was assured throughout. The subcuticular layers were reapproximated with 2-0 plain gut in an interrupted fashion. The skin was closed with 4-0 Vicryl in a subcuticular fashion. The patient tolerated the procedure well. Sponge, lap, and needle counts were correct times two. The patient was taken to the recovery in stable condition. Delivery Summary    Patient: Mary Vallejo MRN: [id number]  SSN: [id number]    YOB: 1990  Age: 33 y.o.   Sex: female        Information for the patient's :  Norris Montanez [[id number]]       Labor Events:    Labor: No    Steroids: None   Cervical Ripening Date/Time:       Cervical Ripening Type: None   Antibiotics During Labor: No   Rupture Identifier: Sac 1    Rupture Date/Time: 2021 10:41 AM   Rupture Type: AROM   Amniotic Fluid Volume:      Amniotic Fluid Description: Clear    Amniotic Fluid Odor: None    Induction: None       Induction Date/Time:        Indications for Induction:      Augmentation: None   Augmentation Date/Time:      Indications for Augmentation:     Labor complications: None       Additional complications:        Delivery Events:  Indications For Episiotomy:     Episiotomy:     Perineal Laceration(s):     Repaired:     Periurethral Laceration Location:      Repaired:     Labial Laceration Location:     Repaired:     Sulcal Laceration Location: Repaired:     Vaginal Laceration Location:     Repaired:     Cervical Laceration Location:     Repaired:     Repair Suture:     Number of Repair Packets:     Estimated Blood Loss (ml):  ml   Quantitaive Blood Loss (ml):             Delivery Date: 2021    Delivery Time: 10:42 AM   Delivery Type: , Low Transverse     Details    Trial of Labor: No   Primary/Repeat: Repeat   Priority: Routine   Indications:  Prior Uterine Surgery       Sex:  Female     Gestational Age: 37w1d  Delivery Clinician:  Joy Wiley  Living Status: Living   Delivery Location: OR #2          APGARS  One minute Five minutes Ten minutes   Skin color: 0   1        Heart rate: 2   2        Grimace: 2   2        Muscle tone: 2   2        Breathin   1        Totals: 7   8          Presentation: Vertex    Position:        Resuscitation Method:  Suctioning-bulb; Tactile Stimulation;Suctioning-deep; Oxygen     Meconium Stained: None      Cord Information: 3 Vessels  Complications: None  Cord around:    Delayed cord clamping? No  Cord clamped date/time:2021 10:43 AM  Disposition of Cord Blood: Lab    Blood Gases Sent?: No    Placenta:  Date/Time: 2021 10:44 AM  Removal: Expressed      Appearance: Normal     [place] Measurements:  Birth Weight: 3.32 kg      Birth Length: 51.5 cm      Head Circumference: 34 cm      Chest Circumference: 33 cm     Abdominal Girth:       Other Providers:   ATUL RODRÍGUEZ;ILDEFONSO ZELAYA;HAMIDA MORSE;SHAMIKA RIOS;SALEEM KIRKLAND;MARTHA CHRISTIAN;RUTHIE SEQUEIRA;EMERSON ESPOSITO;KARINA RUBALCAVA, Obstetrician;Primary Nurse;Primary [place] Nurse;Neonatologist;Anesthesiologist;Crna;Scrub Tech;Scrub Tech;Respiratory Therapist             Group B Strep: No results found for: GRBSEXT, GRBSEXT  Information for the patient's :  Elias Kalyan [[id number]]   No results found for: ABORH, PCTABR, PCTDIG, BILI, ABORHEXT, ABORH     No results for input(s): PCO2CB, PO2CB, HCO3I, SO2I, IBD, PTEMPI, SPECTI, PHICB, ISITE, IDEV, IALLEN in the last 72 hours.

## 2021-12-27 NOTE — ANESTHESIA PROCEDURE NOTES
Spinal Block    Start time: 12/27/2021 9:55 AM  End time: 12/27/2021 10:10 AM  Performed by: Cristopher Arizmendi MD  Authorized by: Cristopher Arizmendi MD     Pre-procedure: Indications: primary anesthetic  Preanesthetic Checklist: patient identified, risks and benefits discussed, anesthesia consent, site marked, patient being monitored and timeout performed    Timeout Time: 09:55 EST          Spinal Block:   Patient Position:  Seated  Prep Region:  Lumbar  Prep: chlorhexidine      Location:  L4-5  Technique:  Single shot        Needle:   Needle Type:  Pencan  Needle Gauge:  24 G  Attempts:  2      Events: CSF confirmed, no blood with aspiration and no paresthesia        Assessment:  Insertion:  Uncomplicated  Patient tolerance:  Patient tolerated the procedure well with no immediate complications  Unable to reach intrathecal space with 3.5 inch 25g iwona. 2nd attempt with 24g pencan 4in successful.

## 2021-12-27 NOTE — ANESTHESIA POSTPROCEDURE EVALUATION
Procedure(s):   SECTION.     spinal    Anesthesia Post Evaluation      Multimodal analgesia: multimodal analgesia used between 6 hours prior to anesthesia start to PACU discharge  Patient location during evaluation: bedside  Patient participation: complete - patient participated  Level of consciousness: awake and alert  Pain management: adequate  Airway patency: patent  Anesthetic complications: no  Cardiovascular status: acceptable  Respiratory status: acceptable  Hydration status: acceptable  Post anesthesia nausea and vomiting:  controlled  Final Post Anesthesia Temperature Assessment:  Normothermia (36.0-37.5 degrees C)      INITIAL Post-op Vital signs:   Vitals Value Taken Time   /64 21 1445   Temp 36.4 °C (97.5 °F) 21 1445   Pulse 88 21 1445   Resp 20 21 1445   SpO2 97 % 21 1445

## 2021-12-27 NOTE — PROGRESS NOTES
SBAR OUT Report: Mother    Verbal report given to ASIF Ko RN (full name & credentials) on this patient, who is now being transferred to MIU (unit) for routine progression of care. The patient is wearing a green \"Anesthesia-Duramorph\" band. Report consisted of patient's Situation, Background, Assessment and Recommendations (SBAR).  ID bands were compared with the identification form, and verified with the patient and receiving nurse. Information from the SBAR and the [address] [facility] Report was reviewed with the receiving nurse; opportunity for questions and clarification provided.

## 2021-12-27 NOTE — PROGRESS NOTES
0730 Admitted for scheduled repeat cs. Admission assessment as documented. Oriented to room, call system. Plan of care reviewed and questions answered. Consents signed, identification band verified and placed. IV placed, blood work drawn per order and sent to lab.

## 2021-12-27 NOTE — PROGRESS NOTES
Admission assessment complete as noted. Patient oriented to room and unit. Plan of care reviewed and patient verbalizes understanding. Questions encouraged and answered. Patent encouraged to call for needs or concerns. Safety Teaching reviewed:   1. Hand hygiene prior to handling the infant. 2. Use of bulb syringe. 3. Bracelets with matching numbers are placed on mother and infant  4. An infant security tag  [facility]) is placed on the infant's ankle and monitored  5. All OB nurses wear pink Employee badges - do not give your baby to anyone without proper identification. 6. Never leave the baby alone in the room. 7. The infant should be placed on their back to sleep. on a firm mattress. No toys should be placed in the crib. (safe sleep video offered to view)  8. Never shake the baby (video offered to view)  9. Infant fall prevention - do not sleep with the baby, and place the baby in the crib while ambulating. 8. Mother and Baby Care booklet given to Mother.

## 2021-12-28 LAB
ERYTHROCYTE [DISTWIDTH] IN BLOOD BY AUTOMATED COUNT: 13.7 % (ref 11.9–14.6)
GLUCOSE BLD STRIP.AUTO-MCNC: 116 MG/DL (ref 65–100)
HCT VFR BLD AUTO: 23.4 % (ref 35.8–46.3)
HGB BLD-MCNC: 7.3 G/DL (ref 11.7–15.4)
MCH RBC QN AUTO: 31.1 PG (ref 26.1–32.9)
MCHC RBC AUTO-ENTMCNC: 31.2 G/DL (ref 31.4–35)
MCV RBC AUTO: 99.6 FL (ref 79.6–97.8)
NRBC # BLD: 0 K/UL (ref 0–0.2)
PLATELET # BLD AUTO: 149 K/UL (ref 150–450)
PMV BLD AUTO: 12.2 FL (ref 9.4–12.3)
RBC # BLD AUTO: 2.35 M/UL (ref 4.05–5.2)
SERVICE CMNT-IMP: ABNORMAL
WBC # BLD AUTO: 9.4 K/UL (ref 4.3–11.1)

## 2021-12-28 PROCEDURE — 2709999900 HC NON-CHARGEABLE SUPPLY

## 2021-12-28 PROCEDURE — 74011250637 HC RX REV CODE- 250/637: Performed by: STUDENT IN AN ORGANIZED HEALTH CARE EDUCATION/TRAINING PROGRAM

## 2021-12-28 PROCEDURE — 82962 GLUCOSE BLOOD TEST: CPT

## 2021-12-28 PROCEDURE — 74011250636 HC RX REV CODE- 250/636: Performed by: STUDENT IN AN ORGANIZED HEALTH CARE EDUCATION/TRAINING PROGRAM

## 2021-12-28 PROCEDURE — 74011250637 HC RX REV CODE- 250/637: Performed by: OBSTETRICS & GYNECOLOGY

## 2021-12-28 PROCEDURE — 65270000029 HC RM PRIVATE

## 2021-12-28 PROCEDURE — 85027 COMPLETE CBC AUTOMATED: CPT

## 2021-12-28 PROCEDURE — 36415 COLL VENOUS BLD VENIPUNCTURE: CPT

## 2021-12-28 RX ORDER — SODIUM CHLORIDE 0.9 % (FLUSH) 0.9 %
5-40 SYRINGE (ML) INJECTION EVERY 8 HOURS
Status: DISCONTINUED | OUTPATIENT
Start: 2021-12-28 | End: 2021-12-29 | Stop reason: HOSPADM

## 2021-12-28 RX ORDER — DIPHENHYDRAMINE HCL 25 MG
25 CAPSULE ORAL
Status: DISCONTINUED | OUTPATIENT
Start: 2021-12-28 | End: 2021-12-29 | Stop reason: HOSPADM

## 2021-12-28 RX ORDER — PANTOPRAZOLE SODIUM 40 MG/1
40 TABLET, DELAYED RELEASE ORAL
Status: DISCONTINUED | OUTPATIENT
Start: 2021-12-29 | End: 2021-12-29 | Stop reason: HOSPADM

## 2021-12-28 RX ORDER — ONDANSETRON 4 MG/1
4 TABLET, ORALLY DISINTEGRATING ORAL
Status: DISCONTINUED | OUTPATIENT
Start: 2021-12-28 | End: 2021-12-29 | Stop reason: HOSPADM

## 2021-12-28 RX ORDER — IBUPROFEN 800 MG/1
800 TABLET ORAL EVERY 6 HOURS
Status: DISCONTINUED | OUTPATIENT
Start: 2021-12-28 | End: 2021-12-29 | Stop reason: HOSPADM

## 2021-12-28 RX ORDER — OXYCODONE HYDROCHLORIDE 5 MG/1
5 TABLET ORAL
Status: DISCONTINUED | OUTPATIENT
Start: 2021-12-28 | End: 2021-12-29 | Stop reason: HOSPADM

## 2021-12-28 RX ORDER — HYDROMORPHONE HYDROCHLORIDE 1 MG/ML
1 INJECTION, SOLUTION INTRAMUSCULAR; INTRAVENOUS; SUBCUTANEOUS
Status: DISCONTINUED | OUTPATIENT
Start: 2021-12-28 | End: 2021-12-29 | Stop reason: HOSPADM

## 2021-12-28 RX ORDER — SIMETHICONE 80 MG
80 TABLET,CHEWABLE ORAL
Status: DISCONTINUED | OUTPATIENT
Start: 2021-12-28 | End: 2021-12-29 | Stop reason: HOSPADM

## 2021-12-28 RX ORDER — DOCUSATE SODIUM 100 MG/1
100 CAPSULE, LIQUID FILLED ORAL 2 TIMES DAILY
Status: DISCONTINUED | OUTPATIENT
Start: 2021-12-28 | End: 2021-12-29 | Stop reason: HOSPADM

## 2021-12-28 RX ORDER — SODIUM CHLORIDE 0.9 % (FLUSH) 0.9 %
5-40 SYRINGE (ML) INJECTION AS NEEDED
Status: DISCONTINUED | OUTPATIENT
Start: 2021-12-28 | End: 2021-12-29 | Stop reason: HOSPADM

## 2021-12-28 RX ORDER — ACETAMINOPHEN 500 MG
1000 TABLET ORAL EVERY 6 HOURS
Status: DISCONTINUED | OUTPATIENT
Start: 2021-12-28 | End: 2021-12-29 | Stop reason: HOSPADM

## 2021-12-28 RX ORDER — NALOXONE HYDROCHLORIDE 0.4 MG/ML
0.4 INJECTION, SOLUTION INTRAMUSCULAR; INTRAVENOUS; SUBCUTANEOUS AS NEEDED
Status: DISCONTINUED | OUTPATIENT
Start: 2021-12-28 | End: 2021-12-29 | Stop reason: HOSPADM

## 2021-12-28 RX ADMIN — SIMETHICONE CHEW TAB 80 MG 80 MG: 80 TABLET ORAL at 18:00

## 2021-12-28 RX ADMIN — OXYCODONE 5 MG: 5 TABLET ORAL at 23:20

## 2021-12-28 RX ADMIN — OXYCODONE 5 MG: 5 TABLET ORAL at 19:03

## 2021-12-28 RX ADMIN — DOCUSATE SODIUM 100 MG: 100 CAPSULE ORAL at 12:40

## 2021-12-28 RX ADMIN — SIMETHICONE CHEW TAB 80 MG 80 MG: 80 TABLET ORAL at 11:30

## 2021-12-28 RX ADMIN — IBUPROFEN 800 MG: 800 TABLET, FILM COATED ORAL at 20:38

## 2021-12-28 RX ADMIN — KETOROLAC TROMETHAMINE 30 MG: 30 INJECTION, SOLUTION INTRAMUSCULAR; INTRAVENOUS at 09:20

## 2021-12-28 RX ADMIN — ACETAMINOPHEN 650 MG: 325 TABLET, FILM COATED ORAL at 05:10

## 2021-12-28 RX ADMIN — IBUPROFEN 800 MG: 800 TABLET, FILM COATED ORAL at 14:36

## 2021-12-28 RX ADMIN — ACETAMINOPHEN 1000 MG: 500 TABLET, FILM COATED ORAL at 18:00

## 2021-12-28 RX ADMIN — KETOROLAC TROMETHAMINE 30 MG: 30 INJECTION, SOLUTION INTRAMUSCULAR; INTRAVENOUS at 00:26

## 2021-12-28 RX ADMIN — ACETAMINOPHEN 1000 MG: 500 TABLET, FILM COATED ORAL at 12:40

## 2021-12-28 RX ADMIN — DOCUSATE SODIUM 100 MG: 100 CAPSULE ORAL at 18:00

## 2021-12-28 NOTE — LACTATION NOTE
This note was copied from a baby's chart. Lactation visit. 3rd time mom, experienced breastfeeder. 37 week infant. Mom reports baby is latching. Has been supplementing via bottle too until milk in more fully. Reviewed normal volume expectations with colostrum. Supply and demand. Encouraged mom to breastfeed baby first at all feeds and then can supplement as desired. Offered pump if desired. Mom declined for now. Mom confident. Offered latch assistance if needed. Feed every 3 hours. Wake as needed.

## 2021-12-28 NOTE — PROGRESS NOTES
Anesthesiology  Post-op Note    Post-op day 1 s/p  via spinal with neuraxial opioids for post-op pain management. Visit Vitals  Blood Pressure 128/81 (BP 1 Location: Left arm, BP Patient Position: At rest)   Pulse 91   Temperature 36.8 °C (98.3 °F)   Respiration 16   Height 5' 9\" (1.753 m)   Weight 144.2 kg (318 lb)   Last Menstrual Period 2021   Oxygen Saturation 97%   Breastfeeding Unknown   Body Mass Index 46.96 kg/m²     Airway patent, patient appropriately hydrated and appears euvolemic. Patient is Alert and oriented. Pain is well controlled. Pruritus is absent. Nausea is absent. No complaints about back or site of injection. Motor and sensory function has returned to baseline in lower extremities. Patient is satisfied with anesthetic and reports no complications. Continue current orders, then initiate surgeon's orders for pain management 24 hours after . Follow up per surgeon.

## 2021-12-28 NOTE — PROGRESS NOTES
Care Management Interventions  PCP Verified by CM: Yes  Palliative Care Criteria Met (RRAT>21 & CHF Dx)?: No  Mode of Transport at Discharge: Other (see comment) (family)  Transition of Care Consult (CM Consult): Other (home with spouse )  Discharge Durable Medical Equipment: No  Physical Therapy Consult: No  Occupational Therapy Consult: No  Speech Therapy Consult: No  Support Systems: Spouse/Significant Other Ivan Jensen [phone])  Confirm Follow Up Transport: Family  Discharge Location  Discharge Placement: Home with family assistance     CM met with patient while social distancing w/appropriate PPE. Patient denies any history of postpartum depression/anxiety. Patient given informational packet on  mood & anxiety disorders (resources/education). Patient denies any additional needs from  at this time. Family has 's contact information should any needs/questions arise.

## 2021-12-28 NOTE — PROGRESS NOTES
Called Dr. Rosa Navarrete regarding pt output only 30 ml over the last two hours. Brielle and cloudy. Pt drinking well. See new orders.

## 2021-12-28 NOTE — LACTATION NOTE

## 2021-12-28 NOTE — PROGRESS NOTES
Pt up to restroom. Unable to void at this time. Tracy care instructed. Ambulated back to bed. Tolerated well. Call light in reach.

## 2021-12-29 VITALS
HEART RATE: 107 BPM | RESPIRATION RATE: 18 BRPM | DIASTOLIC BLOOD PRESSURE: 80 MMHG | SYSTOLIC BLOOD PRESSURE: 135 MMHG | WEIGHT: 293 LBS | OXYGEN SATURATION: 97 % | TEMPERATURE: 97.7 F | HEIGHT: 69 IN | BODY MASS INDEX: 43.4 KG/M2

## 2021-12-29 LAB
GLUCOSE BLD STRIP.AUTO-MCNC: 108 MG/DL (ref 65–100)
HGB BLD-MCNC: 6.6 G/DL (ref 11.7–15.4)
SERVICE CMNT-IMP: ABNORMAL

## 2021-12-29 PROCEDURE — 85018 HEMOGLOBIN: CPT

## 2021-12-29 PROCEDURE — 74011250637 HC RX REV CODE- 250/637: Performed by: OBSTETRICS & GYNECOLOGY

## 2021-12-29 PROCEDURE — 36415 COLL VENOUS BLD VENIPUNCTURE: CPT

## 2021-12-29 PROCEDURE — 82962 GLUCOSE BLOOD TEST: CPT

## 2021-12-29 RX ORDER — IBUPROFEN 800 MG/1
800 TABLET ORAL EVERY 6 HOURS
Qty: 90 TABLET | Refills: 1 | Status: SHIPPED | OUTPATIENT
Start: 2021-12-29 | End: 2022-01-04

## 2021-12-29 RX ORDER — OXYCODONE HYDROCHLORIDE 5 MG/1
5 TABLET ORAL
Qty: 10 TABLET | Refills: 0 | Status: SHIPPED | OUTPATIENT
Start: 2021-12-29 | End: 2022-01-01

## 2021-12-29 RX ADMIN — ACETAMINOPHEN 1000 MG: 500 TABLET, FILM COATED ORAL at 13:01

## 2021-12-29 RX ADMIN — ACETAMINOPHEN 1000 MG: 500 TABLET, FILM COATED ORAL at 06:16

## 2021-12-29 RX ADMIN — SIMETHICONE CHEW TAB 80 MG 80 MG: 80 TABLET ORAL at 01:31

## 2021-12-29 RX ADMIN — SIMETHICONE CHEW TAB 80 MG 80 MG: 80 TABLET ORAL at 08:50

## 2021-12-29 RX ADMIN — IBUPROFEN 800 MG: 800 TABLET, FILM COATED ORAL at 02:31

## 2021-12-29 RX ADMIN — OXYCODONE 5 MG: 5 TABLET ORAL at 11:53

## 2021-12-29 RX ADMIN — DOCUSATE SODIUM 100 MG: 100 CAPSULE ORAL at 08:50

## 2021-12-29 RX ADMIN — IBUPROFEN 800 MG: 800 TABLET, FILM COATED ORAL at 08:50

## 2021-12-29 RX ADMIN — SIMETHICONE CHEW TAB 80 MG 80 MG: 80 TABLET ORAL at 11:53

## 2021-12-29 RX ADMIN — OXYCODONE 5 MG: 5 TABLET ORAL at 02:31

## 2021-12-29 RX ADMIN — ACETAMINOPHEN 1000 MG: 500 TABLET, FILM COATED ORAL at 01:22

## 2021-12-29 NOTE — LACTATION NOTE
This note was copied from a baby's chart. In to follow up with mom and infant prior to discharge to home. Experienced mom stated that infant has continued to latch and nurse well. She stated that she has not offered infant her left breast yet and wanted to attempt. Mom placed infant to her left breast in the football hold and infant latched and started to suck rhythmically. She nursed for 8 minutes and came off the breast content. Mom then placed infant to her right breast and infant latched and started to suck rhythmically. Reviewed discharge information and answered mom's questions. Mom to follow up with lactation consultant as needed.

## 2021-12-29 NOTE — DISCHARGE INSTRUCTIONS
Patient Education         Section: What to Expect at Home  Your Recovery     A  section, or , is surgery to deliver your baby through a cut that the doctor makes in your lower belly and uterus. The cut is called an incision. You may have some pain in your lower belly and need pain medicine for 1 to 2 weeks. You can expect some vaginal bleeding for several weeks. You will probably need about 6 weeks to fully recover. It's important to take it easy while the incision heals. Avoid heavy lifting, strenuous activities, and exercises that strain the belly muscles while you recover. Ask a family member or friend for help with housework, cooking, and shopping. This care sheet gives you a general idea about how long it will take for you to recover. But each person recovers at a different pace. Follow the steps below to get better as quickly as possible. How can you care for yourself at home? Activity    · Rest when you feel tired. Getting enough sleep will help you recover.     · Try to walk each day. Start by walking a little more than you did the day before. Bit by bit, increase the amount you walk. Walking boosts blood flow and helps prevent pneumonia, constipation, and blood clots.     · Avoid strenuous activities, such as bicycle riding, jogging, weightlifting, and aerobic exercise, for 6 weeks or until your doctor says it is okay.     · Until your doctor says it is okay, do not lift anything heavier than your baby.     · Do not do sit-ups or other exercises that strain the belly muscles for 6 weeks or until your doctor says it is okay.     · Hold a pillow over your incision when you cough or take deep breaths. This will support your belly and decrease your pain.     · You may shower as usual. Pat the incision dry when you are done.     · You will have some vaginal bleeding. Wear sanitary pads.  Do not douche or use tampons until your doctor says it is okay.     · Ask your doctor when you can drive again.     · You will probably need to take at least 6 weeks off work. It depends on the type of work you do and how you feel.     · Ask your doctor when it is okay for you to have sex. Diet    · You can eat your normal diet. If your stomach is upset, try bland, low-fat foods like plain rice, broiled chicken, toast, and yogurt.     · Drink plenty of fluids (unless your doctor tells you not to).     · You may notice that your bowel movements are not regular right after your surgery. This is common. Try to avoid constipation and straining with bowel movements. You may want to take a fiber supplement every day. If you have not had a bowel movement after a couple of days, ask your doctor about taking a mild laxative.     · If you are breastfeeding, limit alcohol. Alcohol can cause a lack of energy and other health problems for the baby when a breastfeeding woman drinks heavily. It can also get in the way of a mom's ability to feed her baby or to care for the child in other ways. There isn't a lot of research about exactly how much alcohol can harm a baby. Having no alcohol is the safest choice for your baby. If you choose to have a drink now and then, have only one drink, and limit the number of occasions that you have a drink. Wait to breastfeed at least 2 hours after you have a drink to reduce the amount of alcohol the baby may get in the milk. Medicines    · Your doctor will tell you if and when you can restart your medicines. He or she will also give you instructions about taking any new medicines.     · If you take aspirin or some other blood thinner, ask your doctor if and when to start taking it again. Make sure that you understand exactly what your doctor wants you to do.     · Take pain medicines exactly as directed. ? If the doctor gave you a prescription medicine for pain, take it as prescribed.   ? If you are not taking a prescription pain medicine, ask your doctor if you can take an over-the-counter medicine.     · If you think your pain medicine is making you sick to your stomach:  ? Take your medicine after meals (unless your doctor has told you not to). ? Ask your doctor for a different pain medicine.     · If your doctor prescribed antibiotics, take them as directed. Do not stop taking them just because you feel better. You need to take the full course of antibiotics. Incision care    · If you have strips of tape on the incision, leave the tape on for a week or until it falls off.     · Wash the area daily with warm, soapy water, and pat it dry. Don't use hydrogen peroxide or alcohol, which can slow healing. You may cover the area with a gauze bandage if it weeps or rubs against clothing. Change the bandage every day.     · Keep the area clean and dry. Other instructions    · If you breastfeed your baby, you may be more comfortable while you are healing if you place the baby so that he or she is not resting on your belly. Try tucking your baby under your arm, with his or her body along the side you will be feeding on. Support your baby's upper body with your arm. With that hand you can control your baby's head to bring his or her mouth to your breast. This is sometimes called the football hold. Follow-up care is a key part of your treatment and safety. Be sure to make and go to all appointments, and call your doctor if you are having problems. It's also a good idea to know your test results and keep a list of the medicines you take. When should you call for help? Call 911  anytime you think you may need emergency care. For example, call if:    · You have thoughts of harming yourself, your baby, or another person.     · You passed out (lost consciousness).     · You have chest pain, are short of breath, or cough up blood.     · You have a seizure.    Call your doctor now or seek immediate medical care if:    · You have pain that does not get better after you take pain medicine.     · You have severe vaginal bleeding.     · You are dizzy or lightheaded, or you feel like you may faint.     · You have new or worse pain in your belly or pelvis.     · You have loose stitches, or your incision comes open.     · You have symptoms of infection, such as:  ? Increased pain, swelling, warmth, or redness. ? Red streaks leading from the incision. ? Pus draining from the incision. ? A fever.     · You have symptoms of a blood clot in your leg (called a deep vein thrombosis), such as:  ? Pain in your calf, back of the knee, thigh, or groin. ? Redness and swelling in your leg or groin.     · You have signs of preeclampsia, such as:  ? Sudden swelling of your face, hands, or feet. ? New vision problems (such as dimness, blurring, or seeing spots). ? A severe headache. Watch closely for changes in your health, and be sure to contact your doctor if:    · You do not get better as expected. Where can you learn more? Go to http://www.[facility].com/  Enter M806 in the search box to learn more about \" Section: What to Expect at Home. \"  Current as of: 2021               Content Version: 13.0   [employer]. Care instructions adapted under license by [employer] (which disclaims liability or warranty for this information). If you have questions about a medical condition or this instruction, always ask your healthcare professional. [place] [address] any warranty or liability for your use of this information.

## 2021-12-29 NOTE — LACTATION NOTE

## 2021-12-29 NOTE — PROGRESS NOTES
Dr. Florian Aguilar notified of patient Hgb=6.6. Per Dr. Florian Aguilar, she spoke with the patient this morning and patient states she \"feels fine, not dizzy. \" Per Dr. Florian Aguilar, she spoke with patient about taking oral iron, will strongly encourage her to take the iron. Primary RN notified of results and now new orders received.

## 2021-12-31 NOTE — DISCHARGE SUMMARY
Obstetrical Discharge Summary     Name: Opal Macias MRN: [id number]  SSN: [id number]    YOB: 1990  Age: 33 y.o. Sex: female      Allergies: Patient has no known allergies. Admit Date: 2021    Discharge Date: 2021     Admitting Physician: Bolivar Long MD     Attending Physician:  No att. providers found     * Admission Diagnoses: History of  [Z98.891]  37 weeks gestation of pregnancy [Z3A.37]    * Discharge Diagnoses:   Information for the patient's :  Iza Flannery [[id number]]   Delivery of a 3.32 kg female infant via , Low Transverse on 2021 at 10:42 AM  by Bolivar Long. Apgars were 7  and 8 . Additional Diagnoses:   Hospital Problems as of 2021 Date Reviewed: 2021          Codes Class Noted - Resolved POA    37 weeks gestation of pregnancy ICD-10-CM: Z3A.37  ICD-9-CM: V22.2  2021 - Present Unknown             Lab Results   Component Value Date/Time    ABO/Rh(D) A POSITIVE 2021 07:45 AM    Rubella, External IMM 2021 12:00 AM    ABO,Rh A POS 2021 12:00 AM      Immunization History   Administered Date(s) Administered    COVID-19, PFIZER, MRNA, LNP-S, PF, 30MCG/0.3ML DOSE 2021, 2021    Influenza Vaccine 2014    Tdap 2019       * Procedures: rLTCS  Procedure(s):   SECTION           * Discharge Condition: good    * Hospital Course: Postpartum course was complicated by asymptomatic cute blood loss anemia. After pt counseling, he will take oral iron 3 times daily and follow up in office. Reviewed signs of anemia. * Disposition: Home    Discharge Medications:   Discharge Medication List as of 2021  9:25 AM      START taking these medications    Details   ibuprofen (MOTRIN) 800 mg tablet Take 1 Tablet by mouth every six (6) hours. , Normal, Disp-90 Tablet, R-1      oxyCODONE IR (ROXICODONE) 5 mg immediate release tablet Take 1 Tablet by mouth every four (4) hours as needed for Pain for up to 3 days. Max Daily Amount: 30 mg., Normal, Disp-10 Tablet, R-0         CONTINUE these medications which have NOT CHANGED    Details   calcium carbonate (TUMS) 200 mg calcium (500 mg) chew Take 1 Tablet by mouth daily. , Historical Med      omeprazole (PRILOSEC) 40 mg capsule Take 1 Capsule by mouth daily. Indications: heartburn, Normal, Disp-30 Capsule, R-3      pen needle, diabetic (NovoFine Plus) 32 gauge x 1/6\" ndle 1 Box by Does Not Apply route two (2) times daily (with meals). , Normal, Disp-100 Pen Needle, R-4      lancets (OneTouch Delica Plus Lancet) 30 gauge misc OneTouch Ultra Test strips   USE AS DIRECTED. 4 times a day, Historical Med      docosahexaenoic acid (Prenatal DHA) 200 mg cap capsule Prenatal + DHA   1 po q d, Historical Med         STOP taking these medications       labetaloL (NORMODYNE) 200 mg tablet Comments:   Reason for Stopping:         insulin detemir U-100 (Levemir FlexTouch U-100 Insuln) 100 unit/mL (3 mL) inpn Comments:   Reason for Stopping:               * Follow-up Care/Patient Instructions: Activity: Activity as tolerated, No sex for 6 weeks and No heavy lifting for 6 weeks  Diet: Diabetic Diet  Wound Care: Keep wound clean and dry    Follow-up Information     Follow up With Specialties Details Why [facility] Elizabet Marley [address] Gynecology, Obstetrics, Gynecology In 6 weeks  9 [address]  [phone]                        . I will START or STAY ON the medications listed below when I get home from the hospital:    acetaminophen 325 mg oral tablet  -- 3 tab(s) by mouth every 6 hours, As Needed  -- Indication: For pain    ibuprofen 600 mg oral tablet  -- 1 tab(s) by mouth every 6 hours, As Needed  -- Indication: For pain

## 2022-01-01 ENCOUNTER — APPOINTMENT (OUTPATIENT)
Dept: CT IMAGING | Age: 32
DRG: 769 | End: 2022-01-01
Attending: OBSTETRICS & GYNECOLOGY
Payer: COMMERCIAL

## 2022-01-01 ENCOUNTER — HOSPITAL ENCOUNTER (INPATIENT)
Age: 32
LOS: 1 days | Discharge: ANOTHER HOSPITAL | DRG: 769 | End: 2022-01-03
Attending: OBSTETRICS & GYNECOLOGY | Admitting: OBSTETRICS & GYNECOLOGY
Payer: COMMERCIAL

## 2022-01-01 PROBLEM — T81.49XA POSTOPERATIVE WOUND CELLULITIS: Status: ACTIVE | Noted: 2022-01-01

## 2022-01-01 LAB
ALBUMIN SERPL-MCNC: 2.8 G/DL (ref 3.5–5)
ALBUMIN/GLOB SERPL: 0.7 {RATIO} (ref 1.2–3.5)
ALP SERPL-CCNC: 88 U/L (ref 50–136)
ALT SERPL-CCNC: 34 U/L (ref 12–65)
ANION GAP SERPL CALC-SCNC: 5 MMOL/L (ref 7–16)
APTT PPP: 27.3 SEC (ref 24.1–35.1)
AST SERPL-CCNC: 21 U/L (ref 15–37)
BASOPHILS # BLD: 0 K/UL (ref 0–0.2)
BASOPHILS NFR BLD: 0 % (ref 0–2)
BILIRUB SERPL-MCNC: 0.6 MG/DL (ref 0.2–1.1)
BUN SERPL-MCNC: 10 MG/DL (ref 6–23)
CALCIUM SERPL-MCNC: 8.3 MG/DL (ref 8.3–10.4)
CHLORIDE SERPL-SCNC: 112 MMOL/L (ref 98–107)
CO2 SERPL-SCNC: 25 MMOL/L (ref 21–32)
CREAT SERPL-MCNC: 0.76 MG/DL (ref 0.6–1)
DIFFERENTIAL METHOD BLD: ABNORMAL
EOSINOPHIL # BLD: 0.2 K/UL (ref 0–0.8)
EOSINOPHIL NFR BLD: 3 % (ref 0.5–7.8)
ERYTHROCYTE [DISTWIDTH] IN BLOOD BY AUTOMATED COUNT: 14.2 % (ref 11.9–14.6)
FIBRINOGEN PPP-MCNC: 468 MG/DL (ref 190–501)
GLOBULIN SER CALC-MCNC: 4.3 G/DL (ref 2.3–3.5)
GLUCOSE SERPL-MCNC: 95 MG/DL (ref 65–100)
HCT VFR BLD AUTO: 22.9 % (ref 35.8–46.3)
HGB BLD-MCNC: 6.9 G/DL (ref 11.7–15.4)
IMM GRANULOCYTES # BLD AUTO: 0.1 K/UL (ref 0–0.5)
IMM GRANULOCYTES NFR BLD AUTO: 1 % (ref 0–5)
INR PPP: 0.9
LYMPHOCYTES # BLD: 2.1 K/UL (ref 0.5–4.6)
LYMPHOCYTES NFR BLD: 36 % (ref 13–44)
MCH RBC QN AUTO: 30.7 PG (ref 26.1–32.9)
MCHC RBC AUTO-ENTMCNC: 30.1 G/DL (ref 31.4–35)
MCV RBC AUTO: 101.8 FL (ref 79.6–97.8)
MONOCYTES # BLD: 0.5 K/UL (ref 0.1–1.3)
MONOCYTES NFR BLD: 8 % (ref 4–12)
NEUTS SEG # BLD: 3 K/UL (ref 1.7–8.2)
NEUTS SEG NFR BLD: 51 % (ref 43–78)
NRBC # BLD: 0 K/UL (ref 0–0.2)
PLATELET # BLD AUTO: 227 K/UL (ref 150–450)
PMV BLD AUTO: 10.9 FL (ref 9.4–12.3)
POTASSIUM SERPL-SCNC: 3.9 MMOL/L (ref 3.5–5.1)
PROT SERPL-MCNC: 7.1 G/DL (ref 6.3–8.2)
PROTHROMBIN TIME: 12.6 SEC (ref 12.6–14.5)
RBC # BLD AUTO: 2.25 M/UL (ref 4.05–5.2)
SODIUM SERPL-SCNC: 142 MMOL/L (ref 136–145)
WBC # BLD AUTO: 5.9 K/UL (ref 4.3–11.1)

## 2022-01-01 PROCEDURE — 85730 THROMBOPLASTIN TIME PARTIAL: CPT

## 2022-01-01 PROCEDURE — 86900 BLOOD TYPING SEROLOGIC ABO: CPT

## 2022-01-01 PROCEDURE — 85384 FIBRINOGEN ACTIVITY: CPT

## 2022-01-01 PROCEDURE — 86923 COMPATIBILITY TEST ELECTRIC: CPT

## 2022-01-01 PROCEDURE — 99284 EMERGENCY DEPT VISIT MOD MDM: CPT | Performed by: OBSTETRICS & GYNECOLOGY

## 2022-01-01 PROCEDURE — 74176 CT ABD & PELVIS W/O CONTRAST: CPT

## 2022-01-01 PROCEDURE — G0378 HOSPITAL OBSERVATION PER HR: HCPCS

## 2022-01-01 PROCEDURE — 85025 COMPLETE CBC W/AUTO DIFF WBC: CPT

## 2022-01-01 PROCEDURE — 75810000275 HC EMERGENCY DEPT VISIT NO LEVEL OF CARE

## 2022-01-01 PROCEDURE — 80053 COMPREHEN METABOLIC PANEL: CPT

## 2022-01-01 PROCEDURE — 0JC80ZZ EXTIRPATION OF MATTER FROM ABDOMEN SUBCUTANEOUS TISSUE AND FASCIA, OPEN APPROACH: ICD-10-PCS | Performed by: OBSTETRICS & GYNECOLOGY

## 2022-01-01 PROCEDURE — 85610 PROTHROMBIN TIME: CPT

## 2022-01-01 RX ORDER — ONDANSETRON 4 MG/1
4 TABLET, ORALLY DISINTEGRATING ORAL
Status: DISCONTINUED | OUTPATIENT
Start: 2022-01-01 | End: 2022-01-03 | Stop reason: HOSPADM

## 2022-01-01 RX ORDER — HYDRALAZINE HYDROCHLORIDE 20 MG/ML
10 INJECTION INTRAMUSCULAR; INTRAVENOUS
Status: ACTIVE | OUTPATIENT
Start: 2022-01-01 | End: 2022-01-02

## 2022-01-01 RX ORDER — SODIUM CHLORIDE 0.9 % (FLUSH) 0.9 %
5 SYRINGE (ML) INJECTION AS NEEDED
Status: DISCONTINUED | OUTPATIENT
Start: 2022-01-01 | End: 2022-01-03 | Stop reason: HOSPADM

## 2022-01-01 RX ORDER — LABETALOL HYDROCHLORIDE 5 MG/ML
80 INJECTION, SOLUTION INTRAVENOUS
Status: ACTIVE | OUTPATIENT
Start: 2022-01-01 | End: 2022-01-02

## 2022-01-01 RX ORDER — FAMOTIDINE 20 MG/1
20 TABLET, FILM COATED ORAL 2 TIMES DAILY
Status: DISCONTINUED | OUTPATIENT
Start: 2022-01-02 | End: 2022-01-03 | Stop reason: HOSPADM

## 2022-01-01 RX ORDER — LABETALOL HYDROCHLORIDE 5 MG/ML
40 INJECTION, SOLUTION INTRAVENOUS
Status: ACTIVE | OUTPATIENT
Start: 2022-01-01 | End: 2022-01-02

## 2022-01-01 RX ORDER — ALBUMIN HUMAN 50 G/1000ML
25 SOLUTION INTRAVENOUS AS NEEDED
Status: DISPENSED | OUTPATIENT
Start: 2022-01-01 | End: 2022-01-01

## 2022-01-01 RX ORDER — CEFAZOLIN SODIUM/WATER 2 G/20 ML
2 SYRINGE (ML) INTRAVENOUS EVERY 6 HOURS
Status: DISCONTINUED | OUTPATIENT
Start: 2022-01-01 | End: 2022-01-02

## 2022-01-01 RX ORDER — OXYTOCIN/RINGER'S LACTATE 30/500 ML
10 PLASTIC BAG, INJECTION (ML) INTRAVENOUS AS NEEDED
Status: DISCONTINUED | OUTPATIENT
Start: 2022-01-01 | End: 2022-01-02

## 2022-01-01 RX ORDER — SODIUM CHLORIDE 0.9 % (FLUSH) 0.9 %
5-40 SYRINGE (ML) INJECTION AS NEEDED
Status: DISCONTINUED | OUTPATIENT
Start: 2022-01-01 | End: 2022-01-03 | Stop reason: HOSPADM

## 2022-01-01 RX ORDER — OXYCODONE AND ACETAMINOPHEN 5; 325 MG/1; MG/1
1 TABLET ORAL
Status: DISCONTINUED | OUTPATIENT
Start: 2022-01-01 | End: 2022-01-02

## 2022-01-01 RX ORDER — SODIUM CHLORIDE 0.9 % (FLUSH) 0.9 %
5-40 SYRINGE (ML) INJECTION EVERY 8 HOURS
Status: DISCONTINUED | OUTPATIENT
Start: 2022-01-01 | End: 2022-01-03 | Stop reason: HOSPADM

## 2022-01-01 RX ORDER — LABETALOL HYDROCHLORIDE 5 MG/ML
20 INJECTION, SOLUTION INTRAVENOUS
Status: ACTIVE | OUTPATIENT
Start: 2022-01-01 | End: 2022-01-02

## 2022-01-01 RX ORDER — METRONIDAZOLE 500 MG/100ML
500 INJECTION, SOLUTION INTRAVENOUS EVERY 8 HOURS
Status: DISCONTINUED | OUTPATIENT
Start: 2022-01-01 | End: 2022-01-03

## 2022-01-01 RX ORDER — ONDANSETRON 2 MG/ML
4 INJECTION INTRAMUSCULAR; INTRAVENOUS
Status: DISCONTINUED | OUTPATIENT
Start: 2022-01-01 | End: 2022-01-03 | Stop reason: HOSPADM

## 2022-01-01 RX ORDER — OXYTOCIN/RINGER'S LACTATE 30/500 ML
87.3 PLASTIC BAG, INJECTION (ML) INTRAVENOUS AS NEEDED
Status: DISCONTINUED | OUTPATIENT
Start: 2022-01-01 | End: 2022-01-02

## 2022-01-01 RX ORDER — SODIUM CHLORIDE, SODIUM LACTATE, POTASSIUM CHLORIDE, CALCIUM CHLORIDE 600; 310; 30; 20 MG/100ML; MG/100ML; MG/100ML; MG/100ML
125 INJECTION, SOLUTION INTRAVENOUS CONTINUOUS
Status: DISCONTINUED | OUTPATIENT
Start: 2022-01-01 | End: 2022-01-03

## 2022-01-02 ENCOUNTER — ANESTHESIA EVENT (OUTPATIENT)
Dept: LABOR AND DELIVERY | Age: 32
DRG: 769 | End: 2022-01-02
Payer: COMMERCIAL

## 2022-01-02 ENCOUNTER — ANESTHESIA (OUTPATIENT)
Dept: LABOR AND DELIVERY | Age: 32
DRG: 769 | End: 2022-01-02
Payer: COMMERCIAL

## 2022-01-02 LAB
COVID-19 RAPID TEST, COVR: DETECTED
HCT VFR BLD AUTO: 21.9 % (ref 35.8–46.3)
HGB BLD-MCNC: 6.6 G/DL (ref 11.7–15.4)
HGB BLD-MCNC: 7.7 G/DL (ref 11.7–15.4)
HISTORY CHECKED?,CKHIST: NORMAL
SOURCE, COVRS: ABNORMAL

## 2022-01-02 PROCEDURE — 74011000250 HC RX REV CODE- 250: Performed by: NURSE ANESTHETIST, CERTIFIED REGISTERED

## 2022-01-02 PROCEDURE — 74011250636 HC RX REV CODE- 250/636: Performed by: ANESTHESIOLOGY

## 2022-01-02 PROCEDURE — 75410000003 HC RECOV DEL/VAG/CSECN EA 0.5 HR: Performed by: OBSTETRICS & GYNECOLOGY

## 2022-01-02 PROCEDURE — 96365 THER/PROPH/DIAG IV INF INIT: CPT

## 2022-01-02 PROCEDURE — 77030037088 HC TUBE ENDOTRACH ORAL NSL COVD-A: Performed by: NURSE ANESTHETIST, CERTIFIED REGISTERED

## 2022-01-02 PROCEDURE — 77030033542 HC RETRCTR RETNTS PANICLS GQSM -B: Performed by: OBSTETRICS & GYNECOLOGY

## 2022-01-02 PROCEDURE — 74011250637 HC RX REV CODE- 250/637: Performed by: ANESTHESIOLOGY

## 2022-01-02 PROCEDURE — 65270000029 HC RM PRIVATE

## 2022-01-02 PROCEDURE — 76060000033 HC ANESTHESIA 1 TO 1.5 HR: Performed by: OBSTETRICS & GYNECOLOGY

## 2022-01-02 PROCEDURE — 85018 HEMOGLOBIN: CPT

## 2022-01-02 PROCEDURE — 74011250636 HC RX REV CODE- 250/636: Performed by: NURSE ANESTHETIST, CERTIFIED REGISTERED

## 2022-01-02 PROCEDURE — 30233N1 TRANSFUSION OF NONAUTOLOGOUS RED BLOOD CELLS INTO PERIPHERAL VEIN, PERCUTANEOUS APPROACH: ICD-10-PCS | Performed by: OBSTETRICS & GYNECOLOGY

## 2022-01-02 PROCEDURE — 74011000250 HC RX REV CODE- 250: Performed by: OBSTETRICS & GYNECOLOGY

## 2022-01-02 PROCEDURE — 77030039425 HC BLD LARYNG TRULITE DISP TELE -A: Performed by: NURSE ANESTHETIST, CERTIFIED REGISTERED

## 2022-01-02 PROCEDURE — 2709999900 HC NON-CHARGEABLE SUPPLY

## 2022-01-02 PROCEDURE — 74011250637 HC RX REV CODE- 250/637: Performed by: OBSTETRICS & GYNECOLOGY

## 2022-01-02 PROCEDURE — 96376 TX/PRO/DX INJ SAME DRUG ADON: CPT

## 2022-01-02 PROCEDURE — 36430 TRANSFUSION BLD/BLD COMPNT: CPT

## 2022-01-02 PROCEDURE — 76010000392 HC C SECN EA ADDL 0.5 HR: Performed by: OBSTETRICS & GYNECOLOGY

## 2022-01-02 PROCEDURE — 77030039147 HC PWDR HEMSTS SURGICEL JNJ -D: Performed by: OBSTETRICS & GYNECOLOGY

## 2022-01-02 PROCEDURE — P9016 RBC LEUKOCYTES REDUCED: HCPCS

## 2022-01-02 PROCEDURE — 96374 THER/PROPH/DIAG INJ IV PUSH: CPT

## 2022-01-02 PROCEDURE — 36415 COLL VENOUS BLD VENIPUNCTURE: CPT

## 2022-01-02 PROCEDURE — 96375 TX/PRO/DX INJ NEW DRUG ADDON: CPT

## 2022-01-02 PROCEDURE — 76010000391 HC C SECN FIRST 1 HR: Performed by: OBSTETRICS & GYNECOLOGY

## 2022-01-02 PROCEDURE — 74011250636 HC RX REV CODE- 250/636: Performed by: OBSTETRICS & GYNECOLOGY

## 2022-01-02 PROCEDURE — G0378 HOSPITAL OBSERVATION PER HR: HCPCS

## 2022-01-02 PROCEDURE — 96366 THER/PROPH/DIAG IV INF ADDON: CPT

## 2022-01-02 PROCEDURE — 96361 HYDRATE IV INFUSION ADD-ON: CPT

## 2022-01-02 PROCEDURE — 87635 SARS-COV-2 COVID-19 AMP PRB: CPT

## 2022-01-02 PROCEDURE — 0JC80ZZ EXTIRPATION OF MATTER FROM ABDOMEN SUBCUTANEOUS TISSUE AND FASCIA, OPEN APPROACH: ICD-10-PCS | Performed by: OBSTETRICS & GYNECOLOGY

## 2022-01-02 PROCEDURE — C1765 ADHESION BARRIER: HCPCS | Performed by: OBSTETRICS & GYNECOLOGY

## 2022-01-02 PROCEDURE — 77030040922 HC BLNKT HYPOTHRM STRY -A: Performed by: NURSE ANESTHETIST, CERTIFIED REGISTERED

## 2022-01-02 RX ORDER — SODIUM CHLORIDE, SODIUM LACTATE, POTASSIUM CHLORIDE, CALCIUM CHLORIDE 600; 310; 30; 20 MG/100ML; MG/100ML; MG/100ML; MG/100ML
INJECTION, SOLUTION INTRAVENOUS
Status: DISCONTINUED | OUTPATIENT
Start: 2022-01-02 | End: 2022-01-02 | Stop reason: HOSPADM

## 2022-01-02 RX ORDER — DIPHENHYDRAMINE HCL 25 MG
25 CAPSULE ORAL
Status: DISCONTINUED | OUTPATIENT
Start: 2022-01-02 | End: 2022-01-03 | Stop reason: HOSPADM

## 2022-01-02 RX ORDER — SUCCINYLCHOLINE CHLORIDE 20 MG/ML
INJECTION INTRAMUSCULAR; INTRAVENOUS AS NEEDED
Status: DISCONTINUED | OUTPATIENT
Start: 2022-01-02 | End: 2022-01-02 | Stop reason: HOSPADM

## 2022-01-02 RX ORDER — HYDROMORPHONE HYDROCHLORIDE 1 MG/ML
1 INJECTION, SOLUTION INTRAMUSCULAR; INTRAVENOUS; SUBCUTANEOUS
Status: DISCONTINUED | OUTPATIENT
Start: 2022-01-02 | End: 2022-01-03 | Stop reason: HOSPADM

## 2022-01-02 RX ORDER — ACETAMINOPHEN 500 MG
1000 TABLET ORAL ONCE
Status: COMPLETED | OUTPATIENT
Start: 2022-01-02 | End: 2022-01-02

## 2022-01-02 RX ORDER — LIDOCAINE HYDROCHLORIDE 20 MG/ML
INJECTION, SOLUTION EPIDURAL; INFILTRATION; INTRACAUDAL; PERINEURAL AS NEEDED
Status: DISCONTINUED | OUTPATIENT
Start: 2022-01-02 | End: 2022-01-02 | Stop reason: HOSPADM

## 2022-01-02 RX ORDER — MIDAZOLAM HYDROCHLORIDE 1 MG/ML
2 INJECTION, SOLUTION INTRAMUSCULAR; INTRAVENOUS
Status: CANCELLED | OUTPATIENT
Start: 2022-01-02 | End: 2022-01-03

## 2022-01-02 RX ORDER — SODIUM CHLORIDE 9 MG/ML
250 INJECTION, SOLUTION INTRAVENOUS AS NEEDED
Status: DISCONTINUED | OUTPATIENT
Start: 2022-01-02 | End: 2022-01-03 | Stop reason: HOSPADM

## 2022-01-02 RX ORDER — ONDANSETRON 2 MG/ML
INJECTION INTRAMUSCULAR; INTRAVENOUS AS NEEDED
Status: DISCONTINUED | OUTPATIENT
Start: 2022-01-02 | End: 2022-01-02 | Stop reason: HOSPADM

## 2022-01-02 RX ORDER — ONDANSETRON 4 MG/1
4 TABLET, ORALLY DISINTEGRATING ORAL
Status: DISCONTINUED | OUTPATIENT
Start: 2022-01-02 | End: 2022-01-03 | Stop reason: HOSPADM

## 2022-01-02 RX ORDER — SODIUM CHLORIDE, SODIUM LACTATE, POTASSIUM CHLORIDE, CALCIUM CHLORIDE 600; 310; 30; 20 MG/100ML; MG/100ML; MG/100ML; MG/100ML
25 INJECTION, SOLUTION INTRAVENOUS CONTINUOUS
Status: DISCONTINUED | OUTPATIENT
Start: 2022-01-02 | End: 2022-01-03

## 2022-01-02 RX ORDER — LIDOCAINE HYDROCHLORIDE 10 MG/ML
0.1 INJECTION INFILTRATION; PERINEURAL AS NEEDED
Status: CANCELLED | OUTPATIENT
Start: 2022-01-02

## 2022-01-02 RX ORDER — HYDROCODONE BITARTRATE AND ACETAMINOPHEN 5; 325 MG/1; MG/1
2 TABLET ORAL AS NEEDED
Status: DISCONTINUED | OUTPATIENT
Start: 2022-01-02 | End: 2022-01-02

## 2022-01-02 RX ORDER — OXYCODONE HYDROCHLORIDE 5 MG/1
10 TABLET ORAL
Status: DISCONTINUED | OUTPATIENT
Start: 2022-01-02 | End: 2022-01-03 | Stop reason: HOSPADM

## 2022-01-02 RX ORDER — MIDAZOLAM HYDROCHLORIDE 1 MG/ML
4 INJECTION, SOLUTION INTRAMUSCULAR; INTRAVENOUS ONCE
Status: CANCELLED | OUTPATIENT
Start: 2022-01-02 | End: 2022-01-02

## 2022-01-02 RX ORDER — HYDROMORPHONE HYDROCHLORIDE 1 MG/ML
0.5 INJECTION, SOLUTION INTRAMUSCULAR; INTRAVENOUS; SUBCUTANEOUS
Status: COMPLETED | OUTPATIENT
Start: 2022-01-02 | End: 2022-01-02

## 2022-01-02 RX ORDER — FENTANYL CITRATE 50 UG/ML
INJECTION, SOLUTION INTRAMUSCULAR; INTRAVENOUS AS NEEDED
Status: DISCONTINUED | OUTPATIENT
Start: 2022-01-02 | End: 2022-01-02 | Stop reason: HOSPADM

## 2022-01-02 RX ORDER — NALOXONE HYDROCHLORIDE 0.4 MG/ML
0.4 INJECTION, SOLUTION INTRAMUSCULAR; INTRAVENOUS; SUBCUTANEOUS AS NEEDED
Status: DISCONTINUED | OUTPATIENT
Start: 2022-01-02 | End: 2022-01-03 | Stop reason: HOSPADM

## 2022-01-02 RX ORDER — DEXAMETHASONE SODIUM PHOSPHATE 4 MG/ML
INJECTION, SOLUTION INTRA-ARTICULAR; INTRALESIONAL; INTRAMUSCULAR; INTRAVENOUS; SOFT TISSUE AS NEEDED
Status: DISCONTINUED | OUTPATIENT
Start: 2022-01-02 | End: 2022-01-02 | Stop reason: HOSPADM

## 2022-01-02 RX ORDER — FENTANYL CITRATE 50 UG/ML
100 INJECTION, SOLUTION INTRAMUSCULAR; INTRAVENOUS ONCE
Status: CANCELLED | OUTPATIENT
Start: 2022-01-02 | End: 2022-01-02

## 2022-01-02 RX ORDER — OXYCODONE HYDROCHLORIDE 5 MG/1
5 TABLET ORAL
Status: DISCONTINUED | OUTPATIENT
Start: 2022-01-02 | End: 2022-01-02

## 2022-01-02 RX ORDER — DIPHENHYDRAMINE HCL 25 MG
25 CAPSULE ORAL ONCE
Status: COMPLETED | OUTPATIENT
Start: 2022-01-02 | End: 2022-01-02

## 2022-01-02 RX ORDER — ACETAMINOPHEN 500 MG
1000 TABLET ORAL EVERY 6 HOURS
Status: DISCONTINUED | OUTPATIENT
Start: 2022-01-02 | End: 2022-01-03 | Stop reason: HOSPADM

## 2022-01-02 RX ORDER — SODIUM CHLORIDE, SODIUM LACTATE, POTASSIUM CHLORIDE, CALCIUM CHLORIDE 600; 310; 30; 20 MG/100ML; MG/100ML; MG/100ML; MG/100ML
125 INJECTION, SOLUTION INTRAVENOUS CONTINUOUS
Status: DISCONTINUED | OUTPATIENT
Start: 2022-01-02 | End: 2022-01-02 | Stop reason: SDUPTHER

## 2022-01-02 RX ORDER — SIMETHICONE 80 MG
80 TABLET,CHEWABLE ORAL
Status: DISCONTINUED | OUTPATIENT
Start: 2022-01-02 | End: 2022-01-03 | Stop reason: HOSPADM

## 2022-01-02 RX ORDER — IBUPROFEN 800 MG/1
800 TABLET ORAL EVERY 8 HOURS
Status: DISCONTINUED | OUTPATIENT
Start: 2022-01-02 | End: 2022-01-03 | Stop reason: HOSPADM

## 2022-01-02 RX ORDER — SODIUM CHLORIDE 0.9 % (FLUSH) 0.9 %
5-40 SYRINGE (ML) INJECTION EVERY 8 HOURS
Status: DISCONTINUED | OUTPATIENT
Start: 2022-01-02 | End: 2022-01-03 | Stop reason: HOSPADM

## 2022-01-02 RX ORDER — DOCUSATE SODIUM 100 MG/1
100 CAPSULE, LIQUID FILLED ORAL 2 TIMES DAILY
Status: DISCONTINUED | OUTPATIENT
Start: 2022-01-02 | End: 2022-01-03 | Stop reason: HOSPADM

## 2022-01-02 RX ORDER — ROCURONIUM BROMIDE 10 MG/ML
INJECTION, SOLUTION INTRAVENOUS AS NEEDED
Status: DISCONTINUED | OUTPATIENT
Start: 2022-01-02 | End: 2022-01-02 | Stop reason: HOSPADM

## 2022-01-02 RX ORDER — OXYCODONE HYDROCHLORIDE 5 MG/1
5 TABLET ORAL
Status: DISCONTINUED | OUTPATIENT
Start: 2022-01-02 | End: 2022-01-03 | Stop reason: HOSPADM

## 2022-01-02 RX ORDER — POLYETHYLENE GLYCOL 3350 17 G/17G
17 POWDER, FOR SOLUTION ORAL
Status: DISCONTINUED | OUTPATIENT
Start: 2022-01-02 | End: 2022-01-03 | Stop reason: HOSPADM

## 2022-01-02 RX ORDER — SODIUM CHLORIDE 0.9 % (FLUSH) 0.9 %
5-40 SYRINGE (ML) INJECTION AS NEEDED
Status: DISCONTINUED | OUTPATIENT
Start: 2022-01-02 | End: 2022-01-03 | Stop reason: HOSPADM

## 2022-01-02 RX ORDER — SODIUM CHLORIDE, SODIUM LACTATE, POTASSIUM CHLORIDE, CALCIUM CHLORIDE 600; 310; 30; 20 MG/100ML; MG/100ML; MG/100ML; MG/100ML
75 INJECTION, SOLUTION INTRAVENOUS CONTINUOUS
Status: CANCELLED | OUTPATIENT
Start: 2022-01-02 | End: 2022-01-03

## 2022-01-02 RX ADMIN — HYDROMORPHONE HYDROCHLORIDE 0.5 MG: 1 INJECTION, SOLUTION INTRAMUSCULAR; INTRAVENOUS; SUBCUTANEOUS at 15:46

## 2022-01-02 RX ADMIN — OXYCODONE AND ACETAMINOPHEN 1 TABLET: 5; 325 TABLET ORAL at 07:01

## 2022-01-02 RX ADMIN — CEFAZOLIN SODIUM 2 G: 100 INJECTION, POWDER, LYOPHILIZED, FOR SOLUTION INTRAVENOUS at 00:05

## 2022-01-02 RX ADMIN — CEFAZOLIN SODIUM 3 G: 100 INJECTION, POWDER, LYOPHILIZED, FOR SOLUTION INTRAVENOUS at 16:18

## 2022-01-02 RX ADMIN — CEFAZOLIN SODIUM 3 G: 100 INJECTION, POWDER, LYOPHILIZED, FOR SOLUTION INTRAVENOUS at 10:03

## 2022-01-02 RX ADMIN — SODIUM CHLORIDE, SODIUM LACTATE, POTASSIUM CHLORIDE, AND CALCIUM CHLORIDE: 600; 310; 30; 20 INJECTION, SOLUTION INTRAVENOUS at 14:15

## 2022-01-02 RX ADMIN — HYDROMORPHONE HYDROCHLORIDE 0.5 MG: 1 INJECTION, SOLUTION INTRAMUSCULAR; INTRAVENOUS; SUBCUTANEOUS at 15:27

## 2022-01-02 RX ADMIN — METRONIDAZOLE 500 MG: 500 INJECTION, SOLUTION INTRAVENOUS at 07:54

## 2022-01-02 RX ADMIN — ROCURONIUM BROMIDE 5 MG: 50 INJECTION, SOLUTION INTRAVENOUS at 13:45

## 2022-01-02 RX ADMIN — METRONIDAZOLE 500 MG: 500 INJECTION, SOLUTION INTRAVENOUS at 00:08

## 2022-01-02 RX ADMIN — FAMOTIDINE 20 MG: 20 TABLET ORAL at 09:43

## 2022-01-02 RX ADMIN — ACETAMINOPHEN 1000 MG: 500 TABLET, FILM COATED ORAL at 20:41

## 2022-01-02 RX ADMIN — HYDROMORPHONE HYDROCHLORIDE 0.5 MG: 1 INJECTION, SOLUTION INTRAMUSCULAR; INTRAVENOUS; SUBCUTANEOUS at 15:15

## 2022-01-02 RX ADMIN — DEXAMETHASONE SODIUM PHOSPHATE 10 MG: 4 INJECTION, SOLUTION INTRAMUSCULAR; INTRAVENOUS at 13:45

## 2022-01-02 RX ADMIN — LIDOCAINE HYDROCHLORIDE 100 MG: 20 INJECTION, SOLUTION EPIDURAL; INFILTRATION; INTRACAUDAL; PERINEURAL at 13:45

## 2022-01-02 RX ADMIN — FENTANYL CITRATE 100 MCG: 50 INJECTION INTRAMUSCULAR; INTRAVENOUS at 13:45

## 2022-01-02 RX ADMIN — FAMOTIDINE 20 MG: 20 TABLET ORAL at 17:35

## 2022-01-02 RX ADMIN — METRONIDAZOLE 500 MG: 500 INJECTION, SOLUTION INTRAVENOUS at 16:18

## 2022-01-02 RX ADMIN — OXYCODONE AND ACETAMINOPHEN 1 TABLET: 5; 325 TABLET ORAL at 00:20

## 2022-01-02 RX ADMIN — OXYCODONE 5 MG: 5 TABLET ORAL at 20:41

## 2022-01-02 RX ADMIN — DIPHENHYDRAMINE HYDROCHLORIDE 25 MG: 25 CAPSULE ORAL at 11:49

## 2022-01-02 RX ADMIN — PROMETHAZINE HYDROCHLORIDE 12.5 MG: 25 INJECTION INTRAMUSCULAR; INTRAVENOUS at 15:49

## 2022-01-02 RX ADMIN — DOCUSATE SODIUM 100 MG: 100 CAPSULE ORAL at 17:35

## 2022-01-02 RX ADMIN — SODIUM CHLORIDE, SODIUM LACTATE, POTASSIUM CHLORIDE, AND CALCIUM CHLORIDE 125 ML/HR: 600; 310; 30; 20 INJECTION, SOLUTION INTRAVENOUS at 11:09

## 2022-01-02 RX ADMIN — ACETAMINOPHEN 1000 MG: 500 TABLET, FILM COATED ORAL at 09:59

## 2022-01-02 RX ADMIN — SIMETHICONE 80 MG: 80 TABLET, CHEWABLE ORAL at 17:35

## 2022-01-02 RX ADMIN — ONDANSETRON 4 MG: 2 INJECTION INTRAMUSCULAR; INTRAVENOUS at 13:45

## 2022-01-02 RX ADMIN — SODIUM CHLORIDE, SODIUM LACTATE, POTASSIUM CHLORIDE, AND CALCIUM CHLORIDE: 600; 310; 30; 20 INJECTION, SOLUTION INTRAVENOUS at 13:35

## 2022-01-02 RX ADMIN — CEFAZOLIN SODIUM 3 G: 100 INJECTION, POWDER, LYOPHILIZED, FOR SOLUTION INTRAVENOUS at 22:30

## 2022-01-02 RX ADMIN — IBUPROFEN 800 MG: 200 TABLET, FILM COATED ORAL at 16:18

## 2022-01-02 RX ADMIN — CEFAZOLIN SODIUM 2 G: 100 INJECTION, POWDER, LYOPHILIZED, FOR SOLUTION INTRAVENOUS at 06:58

## 2022-01-02 RX ADMIN — SODIUM CHLORIDE, SODIUM LACTATE, POTASSIUM CHLORIDE, AND CALCIUM CHLORIDE 125 ML/HR: 600; 310; 30; 20 INJECTION, SOLUTION INTRAVENOUS at 15:24

## 2022-01-02 RX ADMIN — SODIUM CHLORIDE, SODIUM LACTATE, POTASSIUM CHLORIDE, AND CALCIUM CHLORIDE 125 ML/HR: 600; 310; 30; 20 INJECTION, SOLUTION INTRAVENOUS at 00:05

## 2022-01-02 RX ADMIN — SODIUM CHLORIDE, SODIUM LACTATE, POTASSIUM CHLORIDE, AND CALCIUM CHLORIDE 1000 ML: 600; 310; 30; 20 INJECTION, SOLUTION INTRAVENOUS at 00:05

## 2022-01-02 RX ADMIN — HYDROCODONE BITARTRATE AND ACETAMINOPHEN 2 TABLET: 5; 325 TABLET ORAL at 16:18

## 2022-01-02 RX ADMIN — SUCCINYLCHOLINE CHLORIDE 200 MG: 20 INJECTION, SOLUTION INTRAMUSCULAR; INTRAVENOUS at 13:45

## 2022-01-02 RX ADMIN — HYDROMORPHONE HYDROCHLORIDE 0.5 MG: 1 INJECTION, SOLUTION INTRAMUSCULAR; INTRAVENOUS; SUBCUTANEOUS at 15:22

## 2022-01-02 NOTE — PROGRESS NOTES
Patient presents to triage with complaints of  contractions every 10-15 min. Denies LOF, DFM, and VB. US and toco placed on soft, non-tender abdomen at this time.

## 2022-01-02 NOTE — PROGRESS NOTES
Patient to 439. Report received from [facility], [facility]. Recovery started.

## 2022-01-02 NOTE — OP NOTES
Evacuation of Anterior Abdominal Wall Hematoma Operative Report     Date of Surgery: 1/2/2022     Preoperative Diagnosis: Abdominal wall hematoma, initial encounter [S30.1XXA]  Acute blood loss anemia, symptomatic  COVID 19 infection  Obesity, BMI 46      Postoperative Diagnosis: same as prior    Procedure: Procedure(s):  Incision and evacuation of anterior abdominal wall hematoma    Surgeon(s) and Role:     Shane Mancini MD - Primary     Anesthesia: General    Procedure Detail:    The patient was taken to the operating room, where general anesthesia was found to be adequate. The patient was prepped and draped in the normal sterile fashion. Prior pfannenstiel skin incision was opened with the scalpel. Subcutaneous space was opened along area of prior dissection. Small amount of dark red, clotted hematoma was evacuated from the subcutaneous space. Prior fascial incision was opened, revealing large amount of foul-smelling, dark red, coagulated hematoma. This was evacuated. The area was copiously irrigated. The rectus muscles were thoroughly inspected for any areas of bleeding. Very few small areas of slight oozing were noted, likely due to debridement of muscles. Hemostasis was achieved with cautery. After confirming hemostasis throughout, Munir surgical coagulation powder was placed over the entire surface of the rectus muscles. Interceed was placed over the lower half of the rectus muscles, where areas of oozing were previously noted. The fascia was closed with 0 Vicryl in a running fashion, with 2 sutures meeting in the midline. Good hemostasis was assured. The subcutaneous layer was irrigated then reapproximated with 2-0 plain gut in running fashion. The skin was closed with a 4-0 Monocryl in a subcuticular fashion. The patient tolerated the procedure well. Sponge, lap, and needle counts were correct times two and the patient was taken to recovery in stable condition.     Findings: large, foul-smelling, anterior abdominal wall hematoma between rectus muscles and fascia    Estimated Blood Loss:  30cc from surgery, QBL will include large hematoma    Specimens: none    Antibiotics: ancef, flagyl

## 2022-01-02 NOTE — H&P
[facility] History and Physical    Name: Hamzah Morgan MRN: [id number] SSN: [id number]    YOB: 1990  Age: 33 y.o. Sex: female       Subjective:      Chief complaint:  Bleeding from incision    Tiffanie Hodge is a 33 y.o.  female with a history of repeat  section on 21 at 37w1d in the setting of prior CS and GHTN presents with acute onset of bleeding from her incision today at 1600. She states she felt a \"twinge\" in the right side of her incision just before the bleeding started. No abdominal pain currently. Postoperative course was complicated by acute blood loss anemia with preop Hgb = 11.3 day of surgery and POD 1 hgb 7.3-->6.6 on POD2. Patient was d/c to home on POD2 on iron replacement in stable condition. She states her lochia was normal.  She has been having normal urination and +BMs. No N/V or CP/SOB. She denies fevers. She is breastfeeding. She has felt \"faint\" at times. No HA or visual changes, no RUQ pain. No LE swelling. She did not require antihypertensive medication with postpartum d/c. Her pregnancy was complicated by GDMA1, GHTN and previous CS.     Past Medical History:   Diagnosis Date    39 weeks gestation of pregnancy 12/15/2019    Diet controlled gestational diabetes mellitus (GDM) in second trimester 2021    Disorder of pregnancy 2021    Encounter for planned induction of labor 12/15/2019    Gestational hypertension     History of elective  8/1/2016    x2    Hydronephrosis of fetus in kenney pregnancy, antepartum 10/7/2021    Hypertension     PCOS (polycystic ovarian syndrome)      Past Surgical History:   Procedure Laterality Date    HX OTHER SURGICAL  2014    EAB x2    HX TONSILLECTOMY  2010    HX WISDOM TEETH EXTRACTION     CS x 2    OB History        5    Para   3    Term   3            AB   2    Living   3       SAB        IAB        Ectopic        Molar        Multiple   0    Live Births   2 No Known Allergies  Current Facility-Administered Medications   Medication Dose Route Frequency Provider Last Rate Last Admin    lactated ringers bolus infusion 1,000 mL  1,000 mL IntraVENous Merlene Perez MD        lactated ringers bolus infusion 500 mL  500 mL IntraVENous DAVID Alex MD        sodium chloride 0.9 % bolus infusion 500 mL  500 mL IntraVENous PRMILLIE Alex MD        saline peripheral flush soln 5 mL  5 mL InterCATHeter PRN Caprice Alex MD        albumin human 5% (BUMINATE) solution 25 g  25 g IntraVENous PRN Caprice Alex MD        oxytocin (PITOCIN) 10 unit bolus from bag  10 Units IntraVENous PRN Caprice Alex MD        And    oxytocin (PITOCIN) 30 units/500 ml LR  87.3 melvi-units/min IntraVENous PRMILLIE Alex MD        ceFAZolin (ANCEF) 2 g/20 mL in sterile water IV syringe  2 g IntraVENous Q6H Caprice Alex MD        metroNIDAZOLE (FLAGYL) IVPB premix 500 mg  500 mg IntraVENous David Thomas MD           Family History   Problem Relation Age of Onset    Hypertension Maternal Grandmother      Social History     Socioeconomic History    Marital status:      Spouse name: Not on file    Number of children: Not on file    Years of education: Not on file    Highest education level: Not on file   Occupational History    Not on file   Tobacco Use    Smoking status: Never Smoker    Smokeless tobacco: Never Used   Substance and Sexual Activity    Alcohol use: No    Drug use: No    Sexual activity: Yes     Partners: Male     Birth control/protection: None     Comment: trying to conceive   Other Topics Concern     Service Not Asked    Blood Transfusions Not Asked    Caffeine Concern Not Asked    Occupational Exposure Not Asked    Hobby Hazards Not Asked    Sleep Concern Not Asked    Stress Concern Not Asked    Weight Concern Not Asked    Special Diet Not Asked    Back Care Not Asked    Exercise Not Asked    Bike Helmet Not Asked   [address] [facility] [facility] Not Asked    Self-Exams Not Asked   Social History Narrative    Not on file     Social Determinants of Health     Financial Resource Strain:     Difficulty of Paying Living Expenses: Not on file   Food Insecurity:     Worried About Running Out of Food in the Last Year: Not on file    Carmen of Food in the Last Year: Not on file   Transportation Needs:     Lack of Transportation (Medical): Not on file    Lack of Transportation (Non-Medical): Not on file   Physical Activity:     Days of Exercise per Week: Not on file    Minutes of Exercise per Session: Not on file   Stress:     Feeling of Stress : Not on file   Social Connections:     Frequency of Communication with Friends and Family: Not on file    Frequency of Social Gatherings with Friends and Family: Not on file    Attends Judaism Services: Not on file    Active Member of [address] [place] [address] [employer] or Organizations: Not on file    Attends Club or Organization Meetings: Not on file    Marital Status: Not on file   Intimate Partner Violence:     Fear of Current or Ex-Partner: Not on file    Emotionally Abused: Not on file    Physically Abused: Not on file    Sexually Abused: Not on file   Housing Stability:     Unable to Pay for Housing in the Last Year: Not on file    Number of [place] in the Last Year: Not on file    Unstable Housing in the Last Year: Not on file       Review of Systems:  A comprehensive review of systems was negative except for that written in the History of Present Illness. Objective:     Vitals:    01/01/22 2110 01/01/22 2130 01/01/22 2131   BP: (!) 153/84 (!) 186/96 (!) 191/103   Pulse: (!) 109 83 83   Resp: 18     Temp: 98.6 °F (37 °C)  99 °F (37.2 °C)   SpO2: 96%       Physical Exam  Constitutional: The patient appears well, alert, oriented x 3. Cardiovascular: Heart RRR, no murmurs.    Respiratory: Lungs clear, no respiratory distress  GI: Abdomen soft, nontender, no guarding  Fundus below umbilicus, non-tender  Incision: clean and dry, right side of incision there is a 3-4 mm superficial disruption with dark old blood spontaneously draining   Pannus with superficial induration and erythema, area of presumed cellulitis outlined with marker  Musculoskeletal: no cva tenderness  Upper ext: no edema, reflexes +2  Lower ext: no edema, neg nohemi's, reflexes +2  Psychiatric:Mood/ Affect: appropriate, she is tearful  Genitourinary: bimanual exam performed  Her cervix is non-tender, no masses or lesions, no uterine tenderness           Assessment:      POD 5 s/p RLTCS at 37w1d on 21 for hx of previous CS and GHTN  Post operative wound disruption   Wound Cellulitis  Postpartum hypertension      Plan:    Admit for IV antibiotics for cellulitis   Imaging to evaluate for location of presumed hematoma with abd/pelvic CT - rule out intraperitoneal process   Labs ordered, CBC, coags, CMP   Treat severe range pressures, labs to evaluate for postpartum preeclampsia - currently patient has no sx and gives hx of significant white coat hypertension. She is breastfeeding, lactation assistance with pump. The patient understands reasons for admission; any and all questions were answered to the patient's satisfaction.

## 2022-01-02 NOTE — PROGRESS NOTES
Pt reports HA and lower abdominal pain. She reports the hematoma feels like a hot watermelon on her lower belly. Pt is breastfeeding. Visit Vitals  BP (!) 149/73   Pulse 85   Temp 98.6 °F (37 °C)   Resp 16   Ht 5' 9\" (1.753 m)   Wt 142.4 kg (314 lb)   SpO2 99%   BMI 46.37 kg/m²     General:  Alert, cooperative, no distress. Head:  Normocephalic, without obvious abnormality, atraumatic. Eyes:  Conjunctivae/corneas clear. Extraocular movements intact. Lungs:   Non-labored respirations. Chest wall:  No tenderness or deformity. Heart:  Regular rate, normal perfusion   Abdomen:   Lower anterior abdominal wall is marked outlining cellulitis, warm, erythematous, tender to palpation   Extremities: Extremities normal, atraumatic, no cyanosis or edema. Skin: Skin color, texture, turgor normal. No acute rashes or lesions. Neurologic: No focal deficits. Normal strength, sensation throughout. CT-scan of abdomen and pelvis  CT Results  (Last 48 hours)               01/01/22 2341  CT ABD PELV WO CONT Final result    Impression:  1. Large lower anterior abdominal wall hematoma. 2.  No acute intra-abdominal or pelvic abnormality. Narrative:  CT ABD PELV WO CONT 1/1/2022 11:41 PM        HISTORY: Hematoma           TECHNIQUE: CT scan of the abdomen and pelvis was performed without IV contrast.   Multiplanar reformats were obtained. Dose reduction techniques were used. CONTRAST: None. FINDINGS:    LOWER CHEST: Normal.       HEPATOBILIARY: Normal.       PANCREAS: Normal.       SPLEEN: Normal.       ADRENAL GLANDS: Normal.       KIDNEYS/BLADDER: No hydronephrosis or renal calculi. Bladder is decompressed but   otherwise unremarkable. BOWEL: No bowel obstruction or diverticulitis. Appendix not visualized. LYMPH NODES: No enlarged abdominal or pelvic lymph nodes. VASCULATURE: Unremarkable. PELVIC ORGANS: Gravid uterus is noted.  Anterior abdominal wall hematoma present   on image 74 of series 2 measuring 17.5 cm transverse dimension, 10.6 cm AP   dimension and 6.3 cm craniocaudal dimension. No free intrapelvic fluid. MUSCULOSKELETAL: Normal.               32yo  POD#6 from rLTCS with large anterior abdominal wall hematoma with cellulitis    -IV abx for now for cellulitis  -Hgb is stable from discharge, but pt is now symptomatic. Transfuse 1u PRBC.  -Pt does not appear to still be bleeding into hematoma, but due to size, evacuation under anesthesia is necessary to facilitate resolution and recovery.  -Pt has been NPO since admission except for PO meds and occasional clear liquids.  -Proceed to OR for evacuation of abdominal wall hematoma.

## 2022-01-02 NOTE — ANESTHESIA POSTPROCEDURE EVALUATION
Procedure(s):   SECTION. general    Anesthesia Post Evaluation      Multimodal analgesia: multimodal analgesia used between 6 hours prior to anesthesia start to PACU discharge  Patient location during evaluation: bedside  Patient participation: complete - patient participated  Level of consciousness: awake and alert  Pain score: 3  Pain management: satisfactory to patient  Airway patency: patent  Anesthetic complications: no  Cardiovascular status: acceptable, blood pressure returned to baseline, hemodynamically stable and stable  Respiratory status: acceptable, spontaneous ventilation, unassisted and room air  Hydration status: acceptable  Post anesthesia nausea and vomiting:  none  Final Post Anesthesia Temperature Assessment:  Normothermia (36.0-37.5 degrees C)      INITIAL Post-op Vital signs:   Vitals Value Taken Time   /60 22 1616   Temp 36.9 °C (98.5 °F) 22 1458   Pulse 74 22 1616   Resp 16 22 1458   SpO2 100 % 22 1621   Vitals shown include unvalidated device data.

## 2022-01-02 NOTE — PROGRESS NOTES
Dr. Debra Alvarez updated on patient's continual HA. Orders received for a one time dose of tylenol.

## 2022-01-02 NOTE — PROGRESS NOTES
Pt presents to 465 from ED with c/o abdominal pain and bleeding. . She is s/p  section X 5 days. She states she felt a pop and noticed bleeding from her  section incision around 1600. She states she has been saturating hand towels     There was report called from the Ed that reported the pt was experiencing a PP hemorrhage, which was not the case on arrival.     Dr. Myra Roche to bedside for examination. 3-4 mm opening noted on right side of  section draining dark red blood. What appears to be cellulitis noted covering entire pannus. . reddened skin, tender to touch with induration noted. Verbal orders to obtain labs and give 2 liter bolus of LR.      And admit to room 465

## 2022-01-02 NOTE — PROGRESS NOTES
Dr. Denise San called and updated on patient's status. Orders to recheck HGB. Patient to remain NPO awaiting CT results.

## 2022-01-03 ENCOUNTER — HOSPITAL ENCOUNTER (INPATIENT)
Age: 32
LOS: 1 days | Discharge: HOME | DRG: 769 | End: 2022-01-04
Attending: FAMILY MEDICINE | Admitting: STUDENT IN AN ORGANIZED HEALTH CARE EDUCATION/TRAINING PROGRAM
Payer: COMMERCIAL

## 2022-01-03 ENCOUNTER — APPOINTMENT (OUTPATIENT)
Dept: GENERAL RADIOLOGY | Age: 32
DRG: 769 | End: 2022-01-03
Attending: OBSTETRICS & GYNECOLOGY
Payer: COMMERCIAL

## 2022-01-03 VITALS
WEIGHT: 293 LBS | HEART RATE: 79 BPM | RESPIRATION RATE: 18 BRPM | HEIGHT: 69 IN | BODY MASS INDEX: 43.4 KG/M2 | DIASTOLIC BLOOD PRESSURE: 89 MMHG | TEMPERATURE: 99.2 F | SYSTOLIC BLOOD PRESSURE: 148 MMHG | OXYGEN SATURATION: 94 %

## 2022-01-03 DIAGNOSIS — O34.219 H/O CESAREAN SECTION COMPLICATING PREGNANCY: Primary | ICD-10-CM

## 2022-01-03 PROBLEM — J96.01 ACUTE RESPIRATORY FAILURE WITH HYPOXIA (HCC): Status: ACTIVE | Noted: 2022-01-03

## 2022-01-03 PROBLEM — U07.1 COVID-19 VIRUS INFECTION: Status: ACTIVE | Noted: 2022-01-03

## 2022-01-03 PROBLEM — E66.01 SEVERE OBESITY (BMI >= 40) (HCC): Status: ACTIVE | Noted: 2021-08-24

## 2022-01-03 LAB
ALBUMIN SERPL-MCNC: 2.4 G/DL (ref 3.5–5)
ALBUMIN/GLOB SERPL: 0.6 {RATIO} (ref 1.2–3.5)
ALP SERPL-CCNC: 75 U/L (ref 50–136)
ALT SERPL-CCNC: 21 U/L (ref 12–65)
ANION GAP SERPL CALC-SCNC: 4 MMOL/L (ref 7–16)
AST SERPL-CCNC: 14 U/L (ref 15–37)
BILIRUB SERPL-MCNC: 0.4 MG/DL (ref 0.2–1.1)
BUN SERPL-MCNC: 8 MG/DL (ref 6–23)
CALCIUM SERPL-MCNC: 7.7 MG/DL (ref 8.3–10.4)
CHLORIDE SERPL-SCNC: 110 MMOL/L (ref 98–107)
CO2 SERPL-SCNC: 25 MMOL/L (ref 21–32)
CREAT SERPL-MCNC: 0.78 MG/DL (ref 0.6–1)
CRP SERPL HS-MCNC: 48 MG/L
D DIMER PPP FEU-MCNC: 12.95 UG/ML(FEU)
ERYTHROCYTE [DISTWIDTH] IN BLOOD BY AUTOMATED COUNT: 15.5 % (ref 11.9–14.6)
GLOBULIN SER CALC-MCNC: 4.1 G/DL (ref 2.3–3.5)
GLUCOSE SERPL-MCNC: 90 MG/DL (ref 65–100)
HCT VFR BLD AUTO: 23.6 % (ref 35.8–46.3)
HGB BLD-MCNC: 7.1 G/DL (ref 11.7–15.4)
HISTORY CHECKED?,CKHIST: NORMAL
MCH RBC QN AUTO: 30.2 PG (ref 26.1–32.9)
MCHC RBC AUTO-ENTMCNC: 30.1 G/DL (ref 31.4–35)
MCV RBC AUTO: 100.4 FL (ref 79.6–97.8)
NRBC # BLD: 0 K/UL (ref 0–0.2)
PLATELET # BLD AUTO: 205 K/UL (ref 150–450)
PMV BLD AUTO: 10.7 FL (ref 9.4–12.3)
POTASSIUM SERPL-SCNC: 4 MMOL/L (ref 3.5–5.1)
PROT SERPL-MCNC: 6.5 G/DL (ref 6.3–8.2)
RBC # BLD AUTO: 2.35 M/UL (ref 4.05–5.2)
SODIUM SERPL-SCNC: 139 MMOL/L (ref 136–145)
WBC # BLD AUTO: 7 K/UL (ref 4.3–11.1)

## 2022-01-03 PROCEDURE — 74011250636 HC RX REV CODE- 250/636: Performed by: OBSTETRICS & GYNECOLOGY

## 2022-01-03 PROCEDURE — 74011250636 HC RX REV CODE- 250/636: Performed by: HOSPITALIST

## 2022-01-03 PROCEDURE — 3E0333Z INTRODUCTION OF ANTI-INFLAMMATORY INTO PERIPHERAL VEIN, PERCUTANEOUS APPROACH: ICD-10-PCS | Performed by: OBSTETRICS & GYNECOLOGY

## 2022-01-03 PROCEDURE — 36430 TRANSFUSION BLD/BLD COMPNT: CPT

## 2022-01-03 PROCEDURE — 71045 X-RAY EXAM CHEST 1 VIEW: CPT

## 2022-01-03 PROCEDURE — 74011250637 HC RX REV CODE- 250/637: Performed by: OBSTETRICS & GYNECOLOGY

## 2022-01-03 PROCEDURE — 74011250636 HC RX REV CODE- 250/636: Performed by: ANESTHESIOLOGY

## 2022-01-03 PROCEDURE — 36415 COLL VENOUS BLD VENIPUNCTURE: CPT

## 2022-01-03 PROCEDURE — 74011000250 HC RX REV CODE- 250: Performed by: OBSTETRICS & GYNECOLOGY

## 2022-01-03 PROCEDURE — 74011250637 HC RX REV CODE- 250/637: Performed by: HOSPITALIST

## 2022-01-03 PROCEDURE — 80053 COMPREHEN METABOLIC PANEL: CPT

## 2022-01-03 PROCEDURE — 74011000250 HC RX REV CODE- 250: Performed by: HOSPITALIST

## 2022-01-03 PROCEDURE — 86141 C-REACTIVE PROTEIN HS: CPT

## 2022-01-03 PROCEDURE — 2709999900 HC NON-CHARGEABLE SUPPLY

## 2022-01-03 PROCEDURE — 65270000029 HC RM PRIVATE

## 2022-01-03 PROCEDURE — 85027 COMPLETE CBC AUTOMATED: CPT

## 2022-01-03 PROCEDURE — 85379 FIBRIN DEGRADATION QUANT: CPT

## 2022-01-03 PROCEDURE — P9016 RBC LEUKOCYTES REDUCED: HCPCS

## 2022-01-03 RX ORDER — SODIUM CHLORIDE, SODIUM LACTATE, POTASSIUM CHLORIDE, CALCIUM CHLORIDE 600; 310; 30; 20 MG/100ML; MG/100ML; MG/100ML; MG/100ML
25 INJECTION, SOLUTION INTRAVENOUS CONTINUOUS
Status: CANCELLED | OUTPATIENT
Start: 2022-01-03

## 2022-01-03 RX ORDER — HYDROMORPHONE HYDROCHLORIDE 1 MG/ML
1 INJECTION, SOLUTION INTRAMUSCULAR; INTRAVENOUS; SUBCUTANEOUS
Status: CANCELLED | OUTPATIENT
Start: 2022-01-03

## 2022-01-03 RX ORDER — NALOXONE HYDROCHLORIDE 0.4 MG/ML
0.4 INJECTION, SOLUTION INTRAMUSCULAR; INTRAVENOUS; SUBCUTANEOUS AS NEEDED
Status: DISCONTINUED | OUTPATIENT
Start: 2022-01-03 | End: 2022-01-04 | Stop reason: HOSPADM

## 2022-01-03 RX ORDER — SODIUM CHLORIDE 0.9 % (FLUSH) 0.9 %
5-40 SYRINGE (ML) INJECTION EVERY 8 HOURS
Status: CANCELLED | OUTPATIENT
Start: 2022-01-03

## 2022-01-03 RX ORDER — SIMETHICONE 80 MG
80 TABLET,CHEWABLE ORAL
Status: CANCELLED | OUTPATIENT
Start: 2022-01-03

## 2022-01-03 RX ORDER — DOCUSATE SODIUM 100 MG/1
100 CAPSULE, LIQUID FILLED ORAL 2 TIMES DAILY
Status: DISCONTINUED | OUTPATIENT
Start: 2022-01-03 | End: 2022-01-04 | Stop reason: HOSPADM

## 2022-01-03 RX ORDER — BUDESONIDE AND FORMOTEROL FUMARATE DIHYDRATE 160; 4.5 UG/1; UG/1
2 AEROSOL RESPIRATORY (INHALATION)
Status: CANCELLED | OUTPATIENT
Start: 2022-01-03

## 2022-01-03 RX ORDER — SULFAMETHOXAZOLE AND TRIMETHOPRIM 800; 160 MG/1; MG/1
1 TABLET ORAL EVERY 12 HOURS
Status: DISCONTINUED | OUTPATIENT
Start: 2022-01-03 | End: 2022-01-04 | Stop reason: HOSPADM

## 2022-01-03 RX ORDER — DIPHENHYDRAMINE HCL 25 MG
25 CAPSULE ORAL ONCE
Status: DISPENSED | OUTPATIENT
Start: 2022-01-03 | End: 2022-01-04

## 2022-01-03 RX ORDER — DEXAMETHASONE SODIUM PHOSPHATE 100 MG/10ML
10 INJECTION INTRAMUSCULAR; INTRAVENOUS ONCE
Status: COMPLETED | OUTPATIENT
Start: 2022-01-03 | End: 2022-01-03

## 2022-01-03 RX ORDER — FAMOTIDINE 20 MG/1
20 TABLET, FILM COATED ORAL 2 TIMES DAILY
Status: CANCELLED | OUTPATIENT
Start: 2022-01-03

## 2022-01-03 RX ORDER — SODIUM CHLORIDE 9 MG/ML
250 INJECTION, SOLUTION INTRAVENOUS AS NEEDED
Status: DISCONTINUED | OUTPATIENT
Start: 2022-01-03 | End: 2022-01-04 | Stop reason: HOSPADM

## 2022-01-03 RX ORDER — SODIUM CHLORIDE 0.9 % (FLUSH) 0.9 %
5-40 SYRINGE (ML) INJECTION EVERY 8 HOURS
Status: DISCONTINUED | OUTPATIENT
Start: 2022-01-03 | End: 2022-01-04 | Stop reason: HOSPADM

## 2022-01-03 RX ORDER — SODIUM CHLORIDE 0.9 % (FLUSH) 0.9 %
5-40 SYRINGE (ML) INJECTION AS NEEDED
Status: DISCONTINUED | OUTPATIENT
Start: 2022-01-03 | End: 2022-01-04 | Stop reason: HOSPADM

## 2022-01-03 RX ORDER — OXYCODONE HYDROCHLORIDE 5 MG/1
5 TABLET ORAL
Status: CANCELLED | OUTPATIENT
Start: 2022-01-03

## 2022-01-03 RX ORDER — SULFAMETHOXAZOLE AND TRIMETHOPRIM 800; 160 MG/1; MG/1
1 TABLET ORAL EVERY 12 HOURS
Status: CANCELLED | OUTPATIENT
Start: 2022-01-03 | End: 2022-01-10

## 2022-01-03 RX ORDER — ONDANSETRON 4 MG/1
4 TABLET, ORALLY DISINTEGRATING ORAL
Status: CANCELLED | OUTPATIENT
Start: 2022-01-03

## 2022-01-03 RX ORDER — NALOXONE HYDROCHLORIDE 0.4 MG/ML
0.4 INJECTION, SOLUTION INTRAMUSCULAR; INTRAVENOUS; SUBCUTANEOUS AS NEEDED
Status: CANCELLED | OUTPATIENT
Start: 2022-01-03

## 2022-01-03 RX ORDER — FUROSEMIDE 10 MG/ML
20 INJECTION INTRAMUSCULAR; INTRAVENOUS ONCE
Status: DISCONTINUED | OUTPATIENT
Start: 2022-01-03 | End: 2022-01-03

## 2022-01-03 RX ORDER — SODIUM CHLORIDE 0.9 % (FLUSH) 0.9 %
5 SYRINGE (ML) INJECTION AS NEEDED
Status: CANCELLED | OUTPATIENT
Start: 2022-01-03

## 2022-01-03 RX ORDER — DEXAMETHASONE SODIUM PHOSPHATE 100 MG/10ML
10 INJECTION INTRAMUSCULAR; INTRAVENOUS EVERY 24 HOURS
Status: CANCELLED | OUTPATIENT
Start: 2022-01-04

## 2022-01-03 RX ORDER — GUAIFENESIN 600 MG/1
600 TABLET, EXTENDED RELEASE ORAL EVERY 12 HOURS
Status: DISCONTINUED | OUTPATIENT
Start: 2022-01-03 | End: 2022-01-03 | Stop reason: HOSPADM

## 2022-01-03 RX ORDER — SODIUM CHLORIDE 0.9 % (FLUSH) 0.9 %
5-40 SYRINGE (ML) INJECTION AS NEEDED
Status: CANCELLED | OUTPATIENT
Start: 2022-01-03

## 2022-01-03 RX ORDER — DIPHENHYDRAMINE HCL 25 MG
25 CAPSULE ORAL ONCE
Status: DISCONTINUED | OUTPATIENT
Start: 2022-01-03 | End: 2022-01-03

## 2022-01-03 RX ORDER — DIPHENHYDRAMINE HCL 25 MG
25 CAPSULE ORAL ONCE
Status: CANCELLED | OUTPATIENT
Start: 2022-01-03 | End: 2022-01-03

## 2022-01-03 RX ORDER — SIMETHICONE 80 MG
80 TABLET,CHEWABLE ORAL
Status: DISCONTINUED | OUTPATIENT
Start: 2022-01-03 | End: 2022-01-04 | Stop reason: HOSPADM

## 2022-01-03 RX ORDER — ALBUTEROL SULFATE 90 UG/1
2 AEROSOL, METERED RESPIRATORY (INHALATION)
Status: DISCONTINUED | OUTPATIENT
Start: 2022-01-03 | End: 2022-01-03 | Stop reason: HOSPADM

## 2022-01-03 RX ORDER — OXYCODONE HYDROCHLORIDE 5 MG/1
10 TABLET ORAL
Status: CANCELLED | OUTPATIENT
Start: 2022-01-03

## 2022-01-03 RX ORDER — SODIUM CHLORIDE 9 MG/ML
250 INJECTION, SOLUTION INTRAVENOUS AS NEEDED
Status: CANCELLED | OUTPATIENT
Start: 2022-01-03

## 2022-01-03 RX ORDER — DOCUSATE SODIUM 100 MG/1
100 CAPSULE, LIQUID FILLED ORAL 2 TIMES DAILY
Status: CANCELLED | OUTPATIENT
Start: 2022-01-03

## 2022-01-03 RX ORDER — ONDANSETRON 4 MG/1
4 TABLET, ORALLY DISINTEGRATING ORAL
Status: DISCONTINUED | OUTPATIENT
Start: 2022-01-03 | End: 2022-01-04 | Stop reason: HOSPADM

## 2022-01-03 RX ORDER — OXYCODONE HYDROCHLORIDE 5 MG/1
10 TABLET ORAL
Status: DISCONTINUED | OUTPATIENT
Start: 2022-01-03 | End: 2022-01-04 | Stop reason: HOSPADM

## 2022-01-03 RX ORDER — DIPHENHYDRAMINE HCL 25 MG
25 CAPSULE ORAL
Status: CANCELLED | OUTPATIENT
Start: 2022-01-03

## 2022-01-03 RX ORDER — SODIUM CHLORIDE 0.9 % (FLUSH) 0.9 %
5 SYRINGE (ML) INJECTION AS NEEDED
Status: DISCONTINUED | OUTPATIENT
Start: 2022-01-03 | End: 2022-01-04 | Stop reason: HOSPADM

## 2022-01-03 RX ORDER — IBUPROFEN 400 MG/1
800 TABLET ORAL EVERY 8 HOURS
Status: DISCONTINUED | OUTPATIENT
Start: 2022-01-04 | End: 2022-01-04 | Stop reason: HOSPADM

## 2022-01-03 RX ORDER — POLYETHYLENE GLYCOL 3350 17 G/17G
17 POWDER, FOR SOLUTION ORAL
Status: DISCONTINUED | OUTPATIENT
Start: 2022-01-03 | End: 2022-01-04 | Stop reason: HOSPADM

## 2022-01-03 RX ORDER — FAMOTIDINE 20 MG/1
20 TABLET, FILM COATED ORAL 2 TIMES DAILY
Status: DISCONTINUED | OUTPATIENT
Start: 2022-01-03 | End: 2022-01-04 | Stop reason: HOSPADM

## 2022-01-03 RX ORDER — DEXAMETHASONE SODIUM PHOSPHATE 100 MG/10ML
10 INJECTION INTRAMUSCULAR; INTRAVENOUS DAILY
Status: DISCONTINUED | OUTPATIENT
Start: 2022-01-04 | End: 2022-01-03 | Stop reason: HOSPADM

## 2022-01-03 RX ORDER — ACETAMINOPHEN 500 MG
1000 TABLET ORAL EVERY 6 HOURS
Status: CANCELLED | OUTPATIENT
Start: 2022-01-03

## 2022-01-03 RX ORDER — ACETAMINOPHEN 500 MG
1000 TABLET ORAL EVERY 6 HOURS
Status: DISCONTINUED | OUTPATIENT
Start: 2022-01-03 | End: 2022-01-04 | Stop reason: HOSPADM

## 2022-01-03 RX ORDER — DIPHENHYDRAMINE HCL 25 MG
25 CAPSULE ORAL
Status: DISCONTINUED | OUTPATIENT
Start: 2022-01-03 | End: 2022-01-04 | Stop reason: HOSPADM

## 2022-01-03 RX ORDER — FUROSEMIDE 10 MG/ML
20 INJECTION INTRAMUSCULAR; INTRAVENOUS ONCE
Status: CANCELLED | OUTPATIENT
Start: 2022-01-03 | End: 2022-01-03

## 2022-01-03 RX ORDER — SODIUM CHLORIDE 9 MG/ML
250 INJECTION, SOLUTION INTRAVENOUS AS NEEDED
Status: DISCONTINUED | OUTPATIENT
Start: 2022-01-03 | End: 2022-01-03 | Stop reason: HOSPADM

## 2022-01-03 RX ORDER — OXYCODONE HYDROCHLORIDE 5 MG/1
5 TABLET ORAL
Status: DISCONTINUED | OUTPATIENT
Start: 2022-01-03 | End: 2022-01-04 | Stop reason: HOSPADM

## 2022-01-03 RX ORDER — SULFAMETHOXAZOLE AND TRIMETHOPRIM 800; 160 MG/1; MG/1
1 TABLET ORAL EVERY 12 HOURS
Status: DISCONTINUED | OUTPATIENT
Start: 2022-01-03 | End: 2022-01-03 | Stop reason: HOSPADM

## 2022-01-03 RX ORDER — ONDANSETRON 2 MG/ML
4 INJECTION INTRAMUSCULAR; INTRAVENOUS
Status: CANCELLED | OUTPATIENT
Start: 2022-01-03

## 2022-01-03 RX ORDER — POLYETHYLENE GLYCOL 3350 17 G/17G
17 POWDER, FOR SOLUTION ORAL
Status: CANCELLED | OUTPATIENT
Start: 2022-01-03

## 2022-01-03 RX ORDER — IBUPROFEN 200 MG
800 TABLET ORAL EVERY 8 HOURS
Status: CANCELLED | OUTPATIENT
Start: 2022-01-03

## 2022-01-03 RX ORDER — GUAIFENESIN 600 MG/1
600 TABLET, EXTENDED RELEASE ORAL EVERY 12 HOURS
Status: CANCELLED | OUTPATIENT
Start: 2022-01-03

## 2022-01-03 RX ORDER — ONDANSETRON 2 MG/ML
4 INJECTION INTRAMUSCULAR; INTRAVENOUS
Status: DISCONTINUED | OUTPATIENT
Start: 2022-01-03 | End: 2022-01-04 | Stop reason: HOSPADM

## 2022-01-03 RX ORDER — ALBUTEROL SULFATE 90 UG/1
2 AEROSOL, METERED RESPIRATORY (INHALATION)
Status: CANCELLED | OUTPATIENT
Start: 2022-01-03

## 2022-01-03 RX ORDER — FUROSEMIDE 10 MG/ML
20 INJECTION INTRAMUSCULAR; INTRAVENOUS ONCE
Status: COMPLETED | OUTPATIENT
Start: 2022-01-03 | End: 2022-01-03

## 2022-01-03 RX ORDER — HYDROMORPHONE HYDROCHLORIDE 1 MG/ML
1 INJECTION, SOLUTION INTRAMUSCULAR; INTRAVENOUS; SUBCUTANEOUS
Status: DISCONTINUED | OUTPATIENT
Start: 2022-01-03 | End: 2022-01-04 | Stop reason: HOSPADM

## 2022-01-03 RX ORDER — BUDESONIDE AND FORMOTEROL FUMARATE DIHYDRATE 160; 4.5 UG/1; UG/1
2 AEROSOL RESPIRATORY (INHALATION)
Status: DISCONTINUED | OUTPATIENT
Start: 2022-01-03 | End: 2022-01-03 | Stop reason: HOSPADM

## 2022-01-03 RX ADMIN — DOCUSATE SODIUM 100 MG: 100 CAPSULE ORAL at 09:40

## 2022-01-03 RX ADMIN — ACETAMINOPHEN 1000 MG: 500 TABLET, FILM COATED ORAL at 10:56

## 2022-01-03 RX ADMIN — SULFAMETHOXAZOLE AND TRIMETHOPRIM 1 TABLET: 800; 160 TABLET ORAL at 20:03

## 2022-01-03 RX ADMIN — CEFAZOLIN SODIUM 3 G: 100 INJECTION, POWDER, LYOPHILIZED, FOR SOLUTION INTRAVENOUS at 04:43

## 2022-01-03 RX ADMIN — ACETAMINOPHEN 1000 MG: 500 TABLET ORAL at 18:41

## 2022-01-03 RX ADMIN — IBUPROFEN 800 MG: 200 TABLET, FILM COATED ORAL at 00:01

## 2022-01-03 RX ADMIN — DEXAMETHASONE SODIUM PHOSPHATE 10 MG: 10 INJECTION INTRAMUSCULAR; INTRAVENOUS at 11:34

## 2022-01-03 RX ADMIN — ACETAMINOPHEN 1000 MG: 500 TABLET, FILM COATED ORAL at 02:38

## 2022-01-03 RX ADMIN — METRONIDAZOLE 500 MG: 500 INJECTION, SOLUTION INTRAVENOUS at 01:18

## 2022-01-03 RX ADMIN — FAMOTIDINE 20 MG: 20 TABLET ORAL at 09:40

## 2022-01-03 RX ADMIN — BUDESONIDE AND FORMOTEROL FUMARATE DIHYDRATE 2 PUFF: 160; 4.5 AEROSOL RESPIRATORY (INHALATION) at 11:07

## 2022-01-03 RX ADMIN — IBUPROFEN 800 MG: 400 TABLET, FILM COATED ORAL at 23:59

## 2022-01-03 RX ADMIN — FUROSEMIDE 20 MG: 10 INJECTION, SOLUTION INTRAMUSCULAR; INTRAVENOUS at 11:36

## 2022-01-03 RX ADMIN — SODIUM CHLORIDE 250 ML: 900 INJECTION, SOLUTION INTRAVENOUS at 12:05

## 2022-01-03 RX ADMIN — ALBUTEROL SULFATE 2 PUFF: 108 INHALANT RESPIRATORY (INHALATION) at 11:08

## 2022-01-03 RX ADMIN — DOCUSATE SODIUM 100 MG: 100 CAPSULE ORAL at 18:41

## 2022-01-03 RX ADMIN — DIPHENHYDRAMINE HCL 25 MG: 25 CAPSULE ORAL at 11:39

## 2022-01-03 RX ADMIN — ACETAMINOPHEN 1000 MG: 500 TABLET ORAL at 20:39

## 2022-01-03 RX ADMIN — SIMETHICONE 80 MG: 80 TABLET, CHEWABLE ORAL at 20:03

## 2022-01-03 RX ADMIN — SULFAMETHOXAZOLE AND TRIMETHOPRIM 1 TABLET: 800; 160 TABLET ORAL at 09:15

## 2022-01-03 RX ADMIN — IBUPROFEN 800 MG: 200 TABLET, FILM COATED ORAL at 09:40

## 2022-01-03 RX ADMIN — SODIUM CHLORIDE, PRESERVATIVE FREE 5 ML: 5 INJECTION INTRAVENOUS at 20:02

## 2022-01-03 RX ADMIN — FUROSEMIDE 20 MG: 10 INJECTION, SOLUTION INTRAMUSCULAR; INTRAVENOUS at 20:02

## 2022-01-03 RX ADMIN — SIMETHICONE 80 MG: 80 TABLET, CHEWABLE ORAL at 15:25

## 2022-01-03 RX ADMIN — SODIUM CHLORIDE, SODIUM LACTATE, POTASSIUM CHLORIDE, AND CALCIUM CHLORIDE 75 ML/HR: 600; 310; 30; 20 INJECTION, SOLUTION INTRAVENOUS at 01:19

## 2022-01-03 RX ADMIN — SIMETHICONE 80 MG: 80 TABLET, CHEWABLE ORAL at 08:25

## 2022-01-03 RX ADMIN — GUAIFENESIN 600 MG: 600 TABLET ORAL at 11:38

## 2022-01-03 RX ADMIN — FAMOTIDINE 20 MG: 20 TABLET ORAL at 18:41

## 2022-01-03 RX ADMIN — DIPHENHYDRAMINE HYDROCHLORIDE 25 MG: 25 CAPSULE ORAL at 18:41

## 2022-01-03 NOTE — PROGRESS NOTES
Discussed case with Dr. Keyla Nassar ([facility]) and Dr. Tommy Sainz (Hospitalist). Patient has recovered well from a post-op standpoint but is having continuing oxygen requirements likely 2/2 COVID. IVF has been stopped. Additional 1u PRBC with lasix has been ordered. We are in agreement that the patient will receive the best care at [facility] under the care of the Hospitalist team there. Dr. Tommy Sainz will speak to his counterparts at [facility] regarding transfer. Care Summary:     The patient presented to [facility]TY 5d postop from repeat  with disrupted surgical wound draining blood. The lower half of the abdomen was erythematous, tender to palpation, and indurated. Moderate amount of dark red blood was expressed from the wound's 1cm opening in the ED. CT A/P revealed [id number] anterior abdominal wall hematoma. The patient's hemoglobin was 6.9 which was stable from discharge several days prior. However, the patient now reported being symptomatic. 1u PRBC was transfused. Decision was made to proceed to the OR for wound exploration and hematoma evacuation. This took place on 22 under general anesthesia. Prior to surgery, the patient reported that her  and son were being tested for COVID 19. The patient's rapid test was +. The surgery was uncomplicated. Approximately 425cc of coagulated hematoma was removed from the subfascial hematoma. After surgery, the patient had persistent O2 requirements and SOB. 1 additional unit PRBC was transfused for persistent anemia. On 1/3/22, discussion was had between myself, KEL, and Dr. Tommy Sainz of the hospitalist team regarding the patient's condition. The decision was made to transfer her to [facility] under the care of the hospitalist.    The patient is pumping breast milk for her  at home. Every effort should be made to be mindful of breast milk compatibility of inpatient medications.  All medications thus far have been evaluated and found to be compatible.      Michael Leon MD  [phone]

## 2022-01-03 NOTE — CONSULTS
[facility] History and Physical       Name:  Eitan Barcenas  Age:31 y.o. Sex:female   :  1990    MRN:  [id number]   PCP:  Sammie Ramos MD      Admit Date:  2022  9:13 PM   Chief Complaint: acute respiratory failure with hypoxia and covid-19 infection    Reason for Admission:   Postpartum  wound disruption [O90.0]  Wound hematoma following  section, postpartum [O90.2]    Assessment & Plan:     # Acute hypoxic respiratory failure // COVID-19 infection   Pt tested positive for COVID on 22  Admitted for post-op abdominal wall hematoma s.p incision and evacuation on 22  F/u with CXR  Ordered for CRP and DIMER  Pt is currently on 3-4 ltr via NC  Titrate for sPO2>92%, wean off as able  Ventolin and budesonide inhaler as ordered  Incentive spirometry  Decadron 10 mg IV x 1 ordered. Continue Decadron 6 mg daily IV from tomorrow for 9 more days  lovenox not ordered as pt has post op anemia with Hb 7.1  DVT prophylaxis with SCD  mucinex 600 mg po bid    # Post-op anemia:  Hb 7.1  Ordered for 1 unit PRBC, check post transfusion CBC  Anemia could contribute to hypoxia, so continue to monitor H/H. # Abdominal wall hematoma post c-secation  S/p I&D on 22, POD # 1          Disposition/Expected LOS: >2mn  Diet: DIET ADULT  DIET ADULT  VTE ppx: scd  GI ppx: PPI  Code status: Full Code  Pt is being transferred to [facility] as pt is COVID-19 positive and cannot stay at [facility] facility due to established policy. Pt needs to be monitored for at least 24-48 hours in view of her respiratory failure, if no clinical worsening is noted, pt can have an ambulatory pulse for home O2 assessment and be discharged on appropriate medications +/- oxygen. History of Presenting Illness:     Eitan Barcenas is a 33 y.o. female with medical history of morbid obesity, BMI 46.3, gestational HTN, PCOS admitted on 22 by Ob-Gyn for post  abdominal wall hematoma.   Pt is s/p I&D for abdominal wall hematoma and as per Dr. Alber Delacruz, pt is doing well from surgical standpoint. Pt does have post op anemia requiring blood transfusion. Pt tested positive for COVID-19 yesterday, noted to be hypoxic, requiring 3-4 ltr constantly via NC/mask. Hospitalist team consulted to evaluate the pt for further management for covid and hypoxia. Pt denies chest pain, fever, chills, sore throat, N/V/D, palpitations, headache. She does reports of known covid exposure around [place] time, and most of her family members are covid positive. She c/o of cough which is mostly dry with exertional dyspnea which has been more pronounced over last 24-48 hours. Review of Systems:  A 14 point review of systems was taken and pertinent positive as per HPI.         Past Medical History:   Diagnosis Date    39 weeks gestation of pregnancy 12/15/2019    Diet controlled gestational diabetes mellitus (GDM) in second trimester 2021    Disorder of pregnancy 2021    Encounter for planned induction of labor 12/15/2019    Gestational hypertension     History of elective  8/1/2016    x2    Hydronephrosis of fetus in [place] pregnancy, antepartum 10/7/2021    Hypertension     PCOS (polycystic ovarian syndrome)        Past Surgical History:   Procedure Laterality Date    HX OTHER SURGICAL  2014    EAB x2    HX TONSILLECTOMY  2010    HX WISDOM TEETH EXTRACTION         Family History : reviewed  Family History   Problem Relation Age of Onset    Hypertension Maternal Grandmother         Social History     Tobacco Use    Smoking status: Never Smoker    Smokeless tobacco: Never Used   Substance Use Topics    Alcohol use: No       No Known Allergies    Immunization History   Administered Date(s) Administered    COVID-19, PFIZER, MRNA, LNP-S, PF, 30MCG/0.3ML DOSE 2021, 2021    Influenza Vaccine 2014    Tdap 2019         PTA Medications:  Current Outpatient Medications Medication Instructions    calcium carbonate (TUMS) 200 mg calcium (500 mg) chew 1 Tablet, Oral, DAILY    docosahexaenoic acid (Prenatal DHA) 200 mg cap capsule Prenatal + DHA   1 po q d    ibuprofen (MOTRIN) 800 mg, Oral, EVERY 6 HOURS    lancets (OneTouch Delica Plus Lancet) 30 gauge misc OneTouch Ultra Test strips   USE AS DIRECTED. 4 times a day    omeprazole (PRILOSEC) 40 mg, Oral, DAILY    pen needle, diabetic (NovoFine Plus) 32 gauge x 1/6\" ndle 1 Box, Does Not Apply, 2 TIMES DAILY WITH MEALS       Objective:     Patient Vitals for the past 24 hrs:   Temp Pulse Resp BP SpO2   01/03/22 1108 -- 84 -- 122/73 --   01/03/22 0948 -- -- -- -- 99 %   01/03/22 0940 -- -- -- -- (!) 74 %   01/03/22 0445 98.2 °F (36.8 °C) -- -- -- --   01/03/22 0038 -- -- -- -- 97 %   01/03/22 0033 -- -- -- -- 98 %   01/03/22 0030 -- 65 -- 115/65 --   01/03/22 0028 -- -- -- -- 97 %   01/03/22 0023 -- -- -- -- 98 %   01/03/22 0018 -- -- -- -- 98 %   01/03/22 0017 -- -- -- -- 91 %   01/03/22 0013 -- -- -- -- 98 %   01/03/22 0008 -- -- -- -- 98 %   01/03/22 0005 -- -- -- -- 91 %   01/03/22 0003 -- -- -- -- 99 %   01/02/22 2358 -- -- -- -- 100 %   01/02/22 2353 -- -- -- -- 94 %   01/02/22 2352 -- -- -- -- (!) 88 %   01/02/22 2348 -- -- -- -- 91 %   01/02/22 2342 -- -- -- -- 95 %   01/02/22 2337 -- -- -- -- 100 %   01/02/22 2332 98.4 °F (36.9 °C) -- -- -- 100 %   01/02/22 2330 -- 66 -- 120/64 --   01/02/22 2327 -- -- -- -- 100 %   01/02/22 2322 -- -- -- -- 100 %   01/02/22 2317 -- -- -- -- 97 %   01/02/22 2314 -- -- -- -- 94 %   01/02/22 2312 -- -- -- -- 96 %   01/02/22 2307 -- -- -- -- 97 %   01/02/22 2302 -- -- -- -- 100 %   01/02/22 2257 -- -- -- -- 100 %   01/02/22 2252 -- -- -- -- 96 %   01/02/22 2247 -- -- -- -- 96 %   01/02/22 2243 -- -- -- -- 94 %   01/02/22 2237 -- -- -- -- 100 %   01/02/22 2232 -- -- -- -- 100 %   01/02/22 2230 -- 72 -- (!) 121/59 --   01/02/22 2227 -- -- -- -- 99 %   01/02/22 2222 -- -- -- -- 97 % 01/02/22 2217 -- -- -- -- 99 %   01/02/22 2212 -- -- -- -- 92 %   01/02/22 2211 -- -- -- -- 91 %   01/02/22 2207 -- -- -- -- 96 %   01/02/22 2202 -- -- -- -- 96 %   01/02/22 2201 -- -- -- -- (!) 79 %   01/02/22 2153 -- -- -- -- (!) 78 %   01/02/22 2151 -- -- -- -- (!) 75 %   01/02/22 2148 -- -- -- -- 98 %   01/02/22 2143 -- -- -- -- 99 %   01/02/22 2139 -- -- -- -- 92 %   01/02/22 2133 -- -- -- -- 94 %   01/02/22 2127 -- -- -- -- 98 %   01/02/22 2122 -- -- -- -- 100 %   01/02/22 2116 -- -- -- -- 100 %   01/02/22 2111 -- -- -- -- 100 %   01/02/22 2106 -- -- -- -- 100 %   01/02/22 2101 -- -- -- -- 100 %   01/02/22 2057 -- -- -- -- 93 %   01/02/22 2051 -- -- -- -- 99 %   01/02/22 2046 -- -- -- -- 100 %   01/02/22 2040 -- -- -- -- 100 %   01/02/22 2033 -- -- -- -- 100 %   01/02/22 2028 -- -- -- -- 100 %   01/02/22 2023 -- -- -- -- 100 %   01/02/22 2018 -- -- -- -- 100 %   01/02/22 2013 -- -- -- -- 100 %   01/02/22 2008 -- -- -- -- 100 %   01/02/22 2003 -- -- -- -- 100 %   01/02/22 1959 -- 63 -- 133/66 --   01/02/22 1958 -- -- -- -- 100 %   01/02/22 1953 -- -- -- -- 100 %   01/02/22 1948 -- -- -- -- 100 %   01/02/22 1943 -- -- -- -- 96 %   01/02/22 1941 -- -- -- -- 93 %   01/02/22 1937 -- -- -- -- 99 %   01/02/22 1932 -- -- -- -- 99 %   01/02/22 1930 98.3 °F (36.8 °C) 83 14 130/77 99 %   01/02/22 1927 -- -- -- -- 100 %   01/02/22 1922 -- -- -- -- 100 %   01/02/22 1917 -- -- -- -- 99 %   01/02/22 1912 -- -- -- -- 99 %   01/02/22 1910 -- -- -- -- 94 %   01/02/22 1907 -- -- -- -- 99 %   01/02/22 1902 -- -- -- -- 98 %   01/02/22 1857 -- -- -- -- 98 %   01/02/22 1852 -- -- -- -- 98 %   01/02/22 1847 -- -- -- -- 98 %   01/02/22 1832 -- -- -- -- 100 %   01/02/22 1830 -- 61 -- 129/66 --   01/02/22 1827 -- -- -- -- 100 %   01/02/22 1822 -- -- -- -- 99 %   01/02/22 1807 -- -- -- -- 98 %   01/02/22 1802 -- -- -- -- 98 %   01/02/22 1757 -- -- -- -- 98 %   01/02/22 1752 -- -- -- -- 98 %   01/02/22 1747 -- -- -- -- 99 %   01/02/22 1742 -- -- -- -- 99 %   01/02/22 1738 -- -- -- -- 94 %   01/02/22 1736 -- -- -- -- 100 %   01/02/22 1731 -- -- -- -- 99 %   01/02/22 1730 -- 65 -- 128/64 --   01/02/22 1726 -- -- -- -- 99 %   01/02/22 1721 -- -- -- -- 99 %   01/02/22 1716 -- -- -- -- 99 %   01/02/22 1636 -- -- -- -- 100 %   01/02/22 1631 -- -- -- -- 99 %   01/02/22 1626 -- -- -- -- 96 %   01/02/22 1625 -- -- -- -- 94 %   01/02/22 1621 -- -- -- -- 100 %   01/02/22 1616 -- 74 -- 127/60 99 %   01/02/22 1603 -- -- -- -- 94 %   01/02/22 1601 -- 85 -- (!) 131/56 97 %   01/02/22 1556 -- -- -- -- 98 %   01/02/22 1551 -- -- -- -- 99 %   01/02/22 1546 -- 80 -- (!) 141/67 100 %   01/02/22 1541 -- -- -- -- 100 %   01/02/22 1536 -- -- -- -- 100 %   01/02/22 1531 -- -- -- (!) 171/100 --   01/02/22 1526 -- -- -- -- 99 %   01/02/22 1521 -- -- -- -- 100 %   01/02/22 1516 -- 97 -- (!) 150/70 --   01/02/22 1511 -- -- -- -- 99 %   01/02/22 1458 98.5 °F (36.9 °C) 100 16 (!) 140/71 100 %   01/02/22 1321 -- 90 -- (!) 142/83 --   01/02/22 1313 -- 97 -- 129/84 --   01/02/22 1250 -- 92 -- 134/63 --   01/02/22 1235 -- 86 -- (!) 143/66 --   01/02/22 1220 98.6 °F (37 °C) 82 -- (!) 141/59 --   01/02/22 1215 -- 88 -- 139/65 --   01/02/22 1210 -- 90 -- (!) 149/77 --   01/02/22 1202 98.4 °F (36.9 °C) 86 -- (!) 145/70 --   01/02/22 1132 -- 88 -- (!) 157/72 --       Oxygen Therapy  O2 Sat (%): 99 % (01/03/22 0948)  O2 Device: Oxygen mask;Humidifier (01/03/22 0939)  O2 Flow Rate (L/min): 4 l/min (01/03/22 0944)    Body mass index is 46.37 kg/m².     Physical Exam:    General:  Alert/awake, NAD, overweight, on 4 ltr supplemental oxygen  HEENT:  Pupils equal and reactive to light and accommodation, oropharynx is clear   Neck:   Supple, no lymphadenopathy, no JVD   Lungs:  Distant clear breath sounds anteriorly, no ronchi  CV:   Regular rate and rhythm with normal S1 and S2   Abdomen:  Soft, appropriate tenderness to palpation, surgical dressing C/D/I,  Extremities:  No cyanosis clubbing or edema   Neuro:  gcs 15, no motor or sensory deficits, CN intact  Psych:  Normal mood and affect       Data Reviewed: I have reviewed all labs, meds, and studies. Recent Results (from the past 24 hour(s))   COVID-19 RAPID TEST    Collection Time: 01/02/22 11:56 AM   Result Value Ref Range    Specimen source Nasopharyngeal      COVID-19 rapid test Detected (AA) NOTD     HEMOGLOBIN    Collection Time: 01/02/22  8:51 PM   Result Value Ref Range    HGB 7.7 (L) 11.7 - 15.4 g/dL   CBC W/O DIFF    Collection Time: 01/03/22  9:16 AM   Result Value Ref Range    WBC 7.0 4.3 - 11.1 K/uL    RBC 2.35 (L) 4.05 - 5.2 M/uL    HGB 7.1 (L) 11.7 - 15.4 g/dL    HCT 23.6 (L) 35.8 - 46.3 %    .4 (H) 79.6 - 97.8 FL    MCH 30.2 26.1 - 32.9 PG    MCHC 30.1 (L) 31.4 - 35.0 g/dL    RDW 15.5 (H) 11.9 - 14.6 %    PLATELET 960 942 - 605 K/uL    MPV 10.7 9.4 - 12.3 FL    ABSOLUTE NRBC 0.00 0.0 - 0.2 K/uL   METABOLIC PANEL, COMPREHENSIVE    Collection Time: 01/03/22  9:16 AM   Result Value Ref Range    Sodium 139 136 - 145 mmol/L    Potassium 4.0 3.5 - 5.1 mmol/L    Chloride 110 (H) 98 - 107 mmol/L    CO2 25 21 - 32 mmol/L    Anion gap 4 (L) 7 - 16 mmol/L    Glucose 90 65 - 100 mg/dL    BUN 8 6 - 23 MG/DL    Creatinine 0.78 0.6 - 1.0 MG/DL    GFR est AA >60 >60 ml/min/1.73m2    GFR est non-AA >60 >60 ml/min/1.73m2    Calcium 7.7 (L) 8.3 - 10.4 MG/DL    Bilirubin, total 0.4 0.2 - 1.1 MG/DL    ALT (SGPT) 21 12 - 65 U/L    AST (SGOT) 14 (L) 15 - 37 U/L    Alk. phosphatase 75 50 - 136 U/L    Protein, total 6.5 6.3 - 8.2 g/dL    Albumin 2.4 (L) 3.5 - 5.0 g/dL    Globulin 4.1 (H) 2.3 - 3.5 g/dL    A-G Ratio 0.6 (L) 1.2 - 3.5     RBC, ALLOCATE    Collection Time: 01/03/22 10:15 AM   Result Value Ref Range    HISTORY CHECKED?  Historical check performed        EKG Results     None          All Micro Results     Procedure Component Value Units Date/Time    COVID-19 RAPID TEST [[id number]]  (Abnormal) Collected: 01/02/22 1156    Order Status: Completed Specimen: Nasopharyngeal Updated: 01/02/22 1225     Specimen source Nasopharyngeal        COVID-19 rapid test Detected        Comment:      The specimen is POSITIVE for SARS-CoV-2, the novel coronavirus associated with COVID-19. This test has been authorized by the FDA under an Emergency Use Authorization (EUA) for use by authorized laboratories. Fact sheet for Healthcare Providers: XDxdate.co.nz  Fact sheet for Patients: XDxdaCarbonite.co.nz       Methodology: Isothermal Nucleic Acid Amplification               Other Studies:  No results found.       Medications:  Medications Administered       acetaminophen (TYLENOL) tablet 650 mg       Admin Date  03/13/2021 Action  Given Dose  650 mg Route  Oral Administered By  Lazaro Blankenship RN              cefTRIAXone (ROCEPHIN) 1 g in 0.9% sodium chloride (MBP/ADV) 50 mL MBP       Admin Date  03/13/2021 Action  New Bag Dose  1 g Rate  100 mL/hr Route  IntraVENous Administered By  Brittni Porter RN              cefTRIAXone (ROCEPHIN) 2 g in 0.9% sodium chloride (MBP/ADV) 50 mL MBP       Admin Date  03/13/2021 Action  New Bag Dose  2 g Rate  100 mL/hr Route  IntraVENous Administered By  Estefani Hughes RN               Admin Date  03/14/2021 Action  New Bag Dose  2 g Rate  100 mL/hr Route  IntraVENous Administered By  Sandra Escobar RN               Admin Date  03/15/2021 Action  New Bag Dose  2 g Rate  100 mL/hr Route  IntraVENous Administered By  Sandra Escobar RN               Admin Date  03/16/2021 Action  New Bag Dose  2 g Rate  100 mL/hr Route  IntraVENous Administered By  Nikole Solorzano              diphenhydrAMINE (BENADRYL) injection 25 mg       Admin Date  03/14/2021 Action  Given Dose  25 mg Route  IntraVENous Administered By  Pretty Martinez RN              doxycycline (VIBRAMYCIN) 100 mg in 0.9% sodium chloride (MBP/ADV) 100 mL MBP       Admin Date  03/13/2021 Action  New Bag Dose  100 mg Rate  100 mL/hr Route  IntraVENous Administered By  Ezequiel Russo RN               Admin Date  03/13/2021 Action  New Bag Dose  100 mg Rate  100 mL/hr Route  IntraVENous Administered By  Ralph Powers RN               Admin Date  03/14/2021 Action  New Bag Dose  100 mg Rate  100 mL/hr Route  IntraVENous Administered By  Rahul Gilmore, FARZANEH               Admin Date  03/14/2021 Action  New Bag Dose  100 mg Rate  100 mL/hr Route  IntraVENous Administered By  Ralph Powers, FARZANEH               Admin Date  03/15/2021 Action  New Bag Dose  100 mg Rate  100 mL/hr Route  IntraVENous Administered By  Rahul Gilmore RN              enoxaparin (LOVENOX) injection 40 mg       Admin Date  03/13/2021 Action  Given Dose  40 mg Route  SubCUTAneous Administered By  Ezequiel Russo RN               Admin Date  03/13/2021 Action  Given Dose  40 mg Route  SubCUTAneous Administered By  Ralph Powers, FARZANEH               Admin Date  03/14/2021 Action  Given Dose  40 mg Route  SubCUTAneous Administered By  Rahul Gilmore, RN               Admin Date  03/14/2021 Action  Given Dose  40 mg Route  SubCUTAneous Administered By  Ralph Powers, FARZANEH               Admin Date  03/15/2021 Action  Given Dose  40 mg Route  SubCUTAneous Administered By  Rahul Gilmore, FARZANEH               Admin Date  03/15/2021 Action  Given Dose  40 mg Route  SubCUTAneous Administered By  Rosalia Polanco RN               Admin Date  03/16/2021 Action  Given Dose  40 mg Route  SubCUTAneous Administered By  Geoffrey Raphael              fentaNYL citrate (PF) injection 50 mcg       Admin Date  03/13/2021 Action  Given Dose  50 mcg Route  IntraVENous Administered By  Ezequiel Russo RN               Admin Date  03/14/2021 Action  Given Dose  50 mcg Route  IntraVENous Administered By  Ralph Powers RN              ferrous sulfate tablet 325 mg       Admin Date  03/14/2021 Action  Given Dose  325 mg Route  Oral Administered By  Rahul Gilmore RN               Admin Date  03/14/2021 Action  Given Dose  325 mg Route  Oral Administered By  Jordin Cramer RN               Admin Date  03/15/2021 Action  Given Dose  325 mg Route  Oral Administered By  Jordin Cramer RN              folic acid (FOLVITE) tablet 1 mg       Admin Date  03/14/2021 Action  Given Dose  1 mg Route  Oral Administered By  Jordin Cramer RN               Admin Date  03/15/2021 Action  Given Dose  1 mg Route  Oral Administered By  Jordin Cramer RN               Admin Date  03/16/2021 Action  Given Dose  1 mg Route  Oral Administered By  Figueroa Harrison              furosemide (LASIX) injection 20 mg       Admin Date  03/15/2021 Action  Given Dose  20 mg Route  IntraVENous Administered By  Jordin Cramer RN              furosemide (LASIX) injection 40 mg       Admin Date  03/13/2021 Action  Given Dose  40 mg Route  IntraVENous Administered By  Hao Mendoza RN               Admin Date  03/13/2021 Action  Given Dose  40 mg Route  IntraVENous Administered By  Lorie Oakley RN               Admin Date  03/14/2021 Action  Given Dose  40 mg Route  IntraVENous Administered By  Jordin Cramer RN               Admin Date  03/14/2021 Action  Given Dose  40 mg Route  IntraVENous Administered By  Lorie Oakley RN               Admin Date  03/15/2021 Action  Given Dose  40 mg Route  IntraVENous Administered By  Tremayne Robert RN               Admin Date  03/16/2021 Action  Given Dose  40 mg Route  IntraVENous Administered By  Figueroa Harrison              HYDROcodone-acetaminophen [facility]) 5-325 mg per tablet 1 Tab       Admin Date  03/14/2021 Action  Given Dose  1 Tab Route  Oral Administered By  Jordin Cramer RN               Admin Date  03/15/2021 Action  Given Dose  1 Tab Route  Oral Administered By  Lorie Oakley RN              insulin lispro (HUMALOG) injection       Admin Date  03/13/2021 Action  Given Dose  2 Units Route  SubCUTAneous Administered By  Lorie Oakley RN               Admin Date  03/14/2021 Action  Given Dose  2 Units Route  SubCUTAneous Administered By  Ale Lai RN               Admin Date  03/14/2021 Action  Given Dose  2 Units Route  SubCUTAneous Administered By  Ale Lai RN               Admin Date  03/14/2021 Action  Given Dose  4 Units Route  SubCUTAneous Administered By  Denisha Chambers RN               Admin Date  03/15/2021 Action  Given Dose  2 Units Route  SubCUTAneous Administered By  Ale Lai RN               Admin Date  03/15/2021 Action  Given Dose  4 Units Route  SubCUTAneous Administered By  Ale Lai RN               Admin Date  03/15/2021 Action  Given Dose  2 Units Route  SubCUTAneous Administered By  Julieta Chan RN               Admin Date  03/16/2021 Action  Given Dose  4 Units Route  SubCUTAneous Administered By  Ayden Florian              levothyroxine (SYNTHROID) tablet 137 mcg       Admin Date  03/14/2021 Action  Given Dose  137 mcg Route  Oral Administered By  Ale Lai RN               Admin Date  03/15/2021 Action  Given Dose  137 mcg Route  Oral Administered By  Ale Lai RN               Admin Date  03/16/2021 Action  Given Dose  137 mcg Route  Oral Administered By  Julieta Chan RN              midodrine (PROAMATINE) tablet 10 mg       Admin Date  03/14/2021 Action  Given Dose  10 mg Route  Oral Administered By  Ale Lai RN               Admin Date  03/14/2021 Action  Given Dose  10 mg Route  Oral Administered By  Ale Lai RN               Admin Date  03/15/2021 Action  Given Dose  10 mg Route  Oral Administered By  Ale Lai RN               Admin Date  03/15/2021 Action  Given Dose  10 mg Route  Oral Administered By  Ale Lai RN               Admin Date  03/15/2021 Action  Given Dose  10 mg Route  Oral Administered By  Ale Lai RN               Admin Date  03/16/2021 Action  Given Dose  10 mg Route  Oral Administered By  Garry Fletcher Date  03/16/2021 Action  Given Dose  10 mg Route  Oral Administered By  Verdis Olszewski Date  03/16/2021 Action  Given Dose  10 mg Route  Oral Administered By  Lona Shore              naloxone [facility]) injection 2 mg       Admin Date  03/13/2021 Action  Given Dose  2 mg Route  IntraVENous Administered By  Aliza Bess RN              NOREPINephrine (LEVOPHED) 4 mg in 5% dextrose 250 mL infusion       Admin Date  03/14/2021 Action  New Bag Dose  4 mcg/min Rate  15 mL/hr Route  IntraVENous Administered By  Lori De Paz RN               Admin Date  03/14/2021 Action  Rate Change Dose  6 mcg/min Rate  22.5 mL/hr Route  IntraVENous Administered By  Lori De Paz RN               Admin Date  03/14/2021 Action  Rate Change Dose  4 mcg/min Rate  15 mL/hr Route  IntraVENous Administered By  Lori De Paz RN               Admin Date  03/14/2021 Action  Rate Change Dose  2 mcg/min Rate  7.5 mL/hr Route  IntraVENous Administered By  Lori De Paz RN               Admin Date  03/14/2021 Action  Rate Change Dose  1 mcg/min Rate  3.8 mL/hr Route  IntraVENous Administered By  Lori De Paz RN               Admin Date  03/14/2021 Action  Restarted Dose  2 mcg/min Rate  7.5 mL/hr Route  IntraVENous Administered By  Lori De Paz RN               Admin Date  03/14/2021 Action  Rate Change Dose  4 mcg/min Rate  15 mL/hr Route  IntraVENous Administered By  Lori De Paz RN               Admin Date  03/15/2021 Action  Rate Change Dose  2 mcg/min Rate  7.5 mL/hr Route  IntraVENous Administered By  Hermila Polanco RN              ondansetron [facility]) injection 4 mg       Admin Date  03/13/2021 Action  Given Dose  4 mg Route  IntraVENous Administered By  Aliza Bess RN              pantoprazole (PROTONIX) tablet 40 mg       Admin Date  03/14/2021 Action  Given Dose  40 mg Route  Oral Administered By  Lori De Paz RN               Admin Date  03/15/2021 Action  Given Dose  40 mg Route  Oral Administered By  Pearly Mcburney, RN               Admin Date  03/16/2021 Action  Given Dose  40 mg Route  Oral Administered By  Deana Schaefer              perflutren lipid microspheres (DEFINITY) in NS bolus IV       Admin Date  03/13/2021 Action  Given Dose  1 mL Route  IntraVENous Administered By  ALEXANDRE Shaver              potassium chloride (K-DUR, KLOR-CON) SR tablet 40 mEq       Admin Date  03/15/2021 Action  Given Dose  40 mEq Route  Oral Administered By  Pearly Mcburney, RN               Admin Date  03/16/2021 Action  Given Dose  40 mEq Route  Oral Administered By  Deana Schaefer              potassium chloride 40 mEq IVPB       Admin Date  03/15/2021 Action  New Bag Dose  40 mEq Rate  25 mL/hr Route  IntraVENous Administered By  Rosie Garcia RN              pregabalin (LYRICA) capsule 300 mg       Admin Date  03/14/2021 Action  Given Dose  300 mg Route  Oral Administered By  Pearly Mcburney, RN               Admin Date  03/14/2021 Action  Given Dose  300 mg Route  Oral Administered By  Pearly Mcburney, RN               Admin Date  03/15/2021 Action  Given Dose  300 mg Route  Oral Administered By  Pearly Mcburney, RN               Admin Date  03/15/2021 Action  Given Dose  300 mg Route  Oral Administered By  Porfirio Weaver RN               Admin Date  03/16/2021 Action  Given Dose  300 mg Route  Oral Administered By  Deana Schaefer              sertraline (ZOLOFT) tablet 300 mg       Admin Date  03/14/2021 Action  Given Dose  300 mg Route  Oral Administered By  Pearly Mcburney, RN               Admin Date  03/15/2021 Action  Given Dose  300 mg Route  Oral Administered By  Pearly Mcburney, RN               Admin Date  03/16/2021 Action  Given Dose  300 mg Route  Oral Administered By  Deana Schaefer              sodium chloride (NS) flush 5-40 mL       Admin Date  03/13/2021 Action  Given Dose  10 mL Route  IntraVENous Administered By  Rosemary Santos RN               Admin Date  03/13/2021 Action  Given Dose  30 mL Route  IntraVENous Administered By  Alina Castillo, RN               Admin Date  03/13/2021 Action  Given Dose  40 mL Route  IntraVENous Administered By  Sy Mcmullen RN               Admin Date  03/14/2021 Action  Given Dose  40 mL Route  IntraVENous Administered By  Sy Mcmuleln RN               Admin Date  03/14/2021 Action  Given Dose  10 mL Route  IntraVENous Administered By  Wendie Rodriguez RN               Admin Date  03/14/2021 Action  Given Dose  40 mL Route  IntraVENous Administered By  Sy Mcmullen RN               Admin Date  03/15/2021 Action  Given Dose  40 mL Route  IntraVENous Administered By  Sy Mcmullne RN               Admin Date  03/15/2021 Action  Given Dose  10 mL Route  IntraVENous Administered By  Wendie Rodriguez RN               Admin Date  03/15/2021 Action  Given Dose  10 mL Route  IntraVENous Administered By  Jori Brown RN               Admin Date  03/16/2021 Action  Given Dose  10 mL Route  IntraVENous Administered By  Jose Alberto Moralez              sodium chloride 0.9 % bolus infusion 250 mL       Admin Date  03/14/2021 Action  New Bag Dose  250 mL Rate  250 mL/hr Route  IntraVENous Administered By  Wendie Rodriguez RN              tiZANidine (ZANAFLEX) tablet 4 mg       Admin Date  03/14/2021 Action  Given Dose  4 mg Route  Oral Administered By  Sy Mcmullen RN               Admin Date  03/14/2021 Action  Given Dose  4 mg Route  Oral Administered By  Wendie Rodriguez RN               Admin Date  03/14/2021 Action  Given Dose  4 mg Route  Oral Administered By  Wendie Rodriguez RN               Admin Date  03/14/2021 Action  Given Dose  4 mg Route  Oral Administered By  Sy Mcmullen RN               Admin Date  03/15/2021 Action  Given Dose  4 mg Route  Oral Administered By  Wendie Rodriguez RN               Admin Date  03/15/2021 Action  Given Dose  4 mg Route  Oral Administered By  Wendie Rodriguez RN               Admin Date  03/15/2021 Action  Given Dose  4 mg Route  Oral Administered By  Tremayne Robert RN               Admin Date  2021 Action  Given Dose  4 mg Route  Oral Administered By  Janetma Higashi Date  2021 Action  Given Dose  4 mg Route  Oral Administered By  Figueroa Harrison              traZODone (DESYREL) tablet 150 mg       Admin Date  2021 Action  Given Dose  150 mg Route  Oral Administered By  Lorie Oakley RN               Admin Date  03/15/2021 Action  Given Dose  150 mg Route  Oral Administered By  Tremayne Robert RN              tuberculin injection 5 Units       Admin Date  03/15/2021 Action  Give PPD Dose  5 Units Route  IntraDERMal Administered By  Jordin Cramer RN              vancomycin (VANCOCIN) 2500 mg in  mL infusion       Admin Date  2021 Action  Given Dose  2,500 mg Rate  200 mL/hr Route  IntraVENous Administered By  Irma Timmons RN              zonisamide (ZONEGRAN) capsule 300 mg       Admin Date  2021 Action  Given Dose  300 mg Route  Oral Administered By  Lorie Oakley RN               Admin Date  03/15/2021 Action  Given Dose  300 mg Route  Oral Administered By  Tremayne Robert RN                        Problem List:     Hospital Problems as of 1/3/2022 Date Reviewed: 2022          Codes Class Noted - Resolved POA    * (Principal) Acute respiratory failure with hypoxia ([facility].) ICD-10-CM: J96.01  ICD-9-CM: 518.81  1/3/2022 - Present Yes        COVID-19 virus infection ICD-10-CM: U07.1  ICD-9-CM: 079.89  1/3/2022 - Present Yes        Wound hematoma following  section, postpartum ICD-10-CM: O90.2  ICD-9-CM: 674.34  2022 - Present Yes        Postpartum  wound disruption ICD-10-CM: O90.0  ICD-9-CM: 674.14  2022 - Present Yes        Postoperative wound cellulitis ICD-10-CM: T81.49XA  ICD-9-CM: 998.59  2022 - Present Yes        Severe obesity (BMI >= 40) ([facility].) ICD-10-CM: E66.01  ICD-9-CM: 278.01  2021 - Present Yes Signed By: Juice Kay MD   [facility] Hospitalist Service    January 3, 2022  4:22 PM

## 2022-01-03 NOTE — PROGRESS NOTES
Pt walking in room and Oxygen saturation drops. Oxygen increased to 3L without significant increase in saturation. Oxygen increased to 4L.

## 2022-01-03 NOTE — PROGRESS NOTES
[address] ambulance service called for transportation to room 820 at the [facility]. Approximate ETA is 1600.

## 2022-01-03 NOTE — PROGRESS NOTES
TRANSFER - IN REPORT:    Verbal report received from Frances RN(name) on Jean Aguayo  being received from [facility]) for routine progression of care      Report consisted of patients Situation, Background, Assessment and   Recommendations(SBAR). Information from the following report(s) SBAR was reviewed with the receiving nurse. Opportunity for questions and clarification was provided. Assessment completed upon patients arrival to unit and care assumed.

## 2022-01-03 NOTE — PROGRESS NOTES
Pt is feeling better from post-op standpoint. No bleeding from incision. Pain controlled with ibuprofen and tylenol. Pumping successfully. Anxious to go home. Still requiring O2. Oxygen sat drops to 80s with sleeping, 70s with activity. Great UOP. General:  Alert, cooperative, no distress. Head:  Normocephalic, without obvious abnormality, atraumatic. Eyes:  Conjunctivae/corneas clear. Extraocular movements intact. Lungs:   Non-labored respirations. Chest wall:  No tenderness or deformity. Heart:  Regular rate, normal perfusion   Abdomen:   Soft, non-tender. No masses. No organomegaly. Erythema and swelling much improved. No bleeding. Extremities: Extremities normal, atraumatic, no cyanosis or edema. Skin: Skin color, texture, turgor normal. No acute rashes or lesions. Neurologic: No focal deficits. Normal strength, sensation throughout. 28yo  admitted with post op hematoma 6d after , ant abdominal wall, POD1 incision and evacuation    Acute blood loss anemia: s/p 1u RBCs. Improvement in Hgb last night. CBC pending this AM.  Post op pain: well controlled with PO meds  Cellulitis: suspect was secondary to hematoma. Hematoma evacuated. Cellulitis largely improve. Switch to PO Bactrim today. COVID- pt tested + prior to OR yesterday. Pt reports SOB. Still requiring O2. Plan to consult hospitalist today for recs regarding inpatient care, goals for discharge. Breastfeeding: All meds so far are breastfeeding compatible. Support lactation.

## 2022-01-03 NOTE — PROGRESS NOTES
RN @ bedside. Pt assisted to throne to pump. O2 titrated down to 3 liters. Verified with Dr. Guanakito Miramontes that all meds are compatible and pt does not need to waste breast milk. Assessment complete. POC reviewed. Questions answered. Wound assessed. Pressure dressing intact. No drainage noted. Scant rubra noted after pumping. Denies additional needs at this time.

## 2022-01-03 NOTE — PROGRESS NOTES
Plan of care discussed with Dr Sylvain Bustos and then the patient regarding, chest xray, 1 more unit of blood and pending transfer to [facility]. Pt verbalizes understanding but is disappointed with not being able to go home.

## 2022-01-04 ENCOUNTER — HOSPITAL ENCOUNTER (EMERGENCY)
Age: 32
Discharge: HOME | End: 2022-01-04
Attending: STUDENT IN AN ORGANIZED HEALTH CARE EDUCATION/TRAINING PROGRAM | Admitting: OBSTETRICS & GYNECOLOGY
Payer: COMMERCIAL

## 2022-01-04 VITALS
OXYGEN SATURATION: 100 % | RESPIRATION RATE: 18 BRPM | DIASTOLIC BLOOD PRESSURE: 84 MMHG | HEART RATE: 71 BPM | SYSTOLIC BLOOD PRESSURE: 143 MMHG | TEMPERATURE: 98 F

## 2022-01-04 VITALS
SYSTOLIC BLOOD PRESSURE: 137 MMHG | DIASTOLIC BLOOD PRESSURE: 76 MMHG | OXYGEN SATURATION: 99 % | TEMPERATURE: 98.9 F | RESPIRATION RATE: 18 BRPM | HEART RATE: 91 BPM

## 2022-01-04 DIAGNOSIS — T81.49XA POSTOPERATIVE WOUND CELLULITIS: ICD-10-CM

## 2022-01-04 PROBLEM — U07.1 COVID: Status: ACTIVE | Noted: 2022-01-04

## 2022-01-04 LAB
ABO + RH BLD: NORMAL
ANION GAP SERPL CALC-SCNC: 7 MMOL/L (ref 7–16)
BASOPHILS # BLD: 0 K/UL (ref 0–0.2)
BASOPHILS NFR BLD: 0 % (ref 0–2)
BLD PROD TYP BPU: NORMAL
BLD PROD TYP BPU: NORMAL
BLOOD GROUP ANTIBODIES SERPL: NORMAL
BPU ID: NORMAL
BPU ID: NORMAL
BUN SERPL-MCNC: 9 MG/DL (ref 6–23)
CALCIUM SERPL-MCNC: 8.3 MG/DL (ref 8.3–10.4)
CHLORIDE SERPL-SCNC: 111 MMOL/L (ref 98–107)
CO2 SERPL-SCNC: 23 MMOL/L (ref 21–32)
CREAT SERPL-MCNC: 0.82 MG/DL (ref 0.6–1)
CROSSMATCH RESULT,%XM: NORMAL
CROSSMATCH RESULT,%XM: NORMAL
DIFFERENTIAL METHOD BLD: ABNORMAL
EOSINOPHIL # BLD: 0 K/UL (ref 0–0.8)
EOSINOPHIL NFR BLD: 0 % (ref 0.5–7.8)
ERYTHROCYTE [DISTWIDTH] IN BLOOD BY AUTOMATED COUNT: 16 % (ref 11.9–14.6)
GLUCOSE SERPL-MCNC: 72 MG/DL (ref 65–100)
HCT VFR BLD AUTO: 26 % (ref 35.8–46.3)
HGB BLD-MCNC: 8 G/DL (ref 11.7–15.4)
IMM GRANULOCYTES # BLD AUTO: 0.1 K/UL (ref 0–0.5)
IMM GRANULOCYTES NFR BLD AUTO: 1 % (ref 0–5)
LYMPHOCYTES # BLD: 2.8 K/UL (ref 0.5–4.6)
LYMPHOCYTES NFR BLD: 35 % (ref 13–44)
MCH RBC QN AUTO: 30.1 PG (ref 26.1–32.9)
MCHC RBC AUTO-ENTMCNC: 30.8 G/DL (ref 31.4–35)
MCV RBC AUTO: 97.7 FL (ref 79.6–97.8)
MONOCYTES # BLD: 0.7 K/UL (ref 0.1–1.3)
MONOCYTES NFR BLD: 8 % (ref 4–12)
NEUTS SEG # BLD: 4.5 K/UL (ref 1.7–8.2)
NEUTS SEG NFR BLD: 56 % (ref 43–78)
NRBC # BLD: 0 K/UL (ref 0–0.2)
PLATELET # BLD AUTO: 209 K/UL (ref 150–450)
PMV BLD AUTO: 10.7 FL (ref 9.4–12.3)
POTASSIUM SERPL-SCNC: 3.7 MMOL/L (ref 3.5–5.1)
RBC # BLD AUTO: 2.66 M/UL (ref 4.05–5.2)
SODIUM SERPL-SCNC: 141 MMOL/L (ref 136–145)
SPECIMEN EXP DATE BLD: NORMAL
STATUS OF UNIT,%ST: NORMAL
STATUS OF UNIT,%ST: NORMAL
UNIT DIVISION, %UDIV: 0
UNIT DIVISION, %UDIV: 0
WBC # BLD AUTO: 8.1 K/UL (ref 4.3–11.1)

## 2022-01-04 PROCEDURE — 99283 EMERGENCY DEPT VISIT LOW MDM: CPT

## 2022-01-04 PROCEDURE — 74011250637 HC RX REV CODE- 250/637: Performed by: HOSPITALIST

## 2022-01-04 PROCEDURE — 80048 BASIC METABOLIC PNL TOTAL CA: CPT

## 2022-01-04 PROCEDURE — 36415 COLL VENOUS BLD VENIPUNCTURE: CPT

## 2022-01-04 PROCEDURE — 85025 COMPLETE CBC W/AUTO DIFF WBC: CPT

## 2022-01-04 RX ORDER — SULFAMETHOXAZOLE AND TRIMETHOPRIM 800; 160 MG/1; MG/1
1 TABLET ORAL 2 TIMES DAILY
Qty: 14 TABLET | Refills: 0 | Status: SHIPPED | OUTPATIENT
Start: 2022-01-04 | End: 2022-01-11

## 2022-01-04 RX ORDER — OXYCODONE HYDROCHLORIDE 5 MG/1
5 TABLET ORAL
Qty: 18 TABLET | Refills: 0 | Status: SHIPPED | OUTPATIENT
Start: 2022-01-04 | End: 2022-01-07

## 2022-01-04 RX ORDER — OXYCODONE HYDROCHLORIDE 10 MG/1
10 TABLET ORAL
Qty: 18 TABLET | Refills: 0 | Status: SHIPPED | OUTPATIENT
Start: 2022-01-04 | End: 2022-01-04

## 2022-01-04 RX ORDER — SIMETHICONE 80 MG
80 TABLET,CHEWABLE ORAL
Qty: 30 TABLET | Refills: 0 | Status: SHIPPED | OUTPATIENT
Start: 2022-01-04 | End: 2022-01-10

## 2022-01-04 RX ORDER — FERROUS SULFATE 325(65) MG
325 TABLET, DELAYED RELEASE (ENTERIC COATED) ORAL
Qty: 90 TABLET | Refills: 0 | Status: SHIPPED | OUTPATIENT
Start: 2022-01-04 | End: 2022-02-03

## 2022-01-04 RX ORDER — DEXAMETHASONE 6 MG/1
6 TABLET ORAL
Qty: 7 TABLET | Refills: 0 | Status: SHIPPED | OUTPATIENT
Start: 2022-01-05 | End: 2022-01-12

## 2022-01-04 RX ORDER — SULFAMETHOXAZOLE AND TRIMETHOPRIM 400; 80 MG/1; MG/1
1 TABLET ORAL 2 TIMES DAILY
Qty: 10 TABLET | Refills: 0 | Status: SHIPPED | OUTPATIENT
Start: 2022-01-04 | End: 2022-01-04

## 2022-01-04 RX ORDER — OXYCODONE HYDROCHLORIDE 5 MG/1
5 TABLET ORAL
Qty: 18 TABLET | Refills: 0 | Status: SHIPPED | OUTPATIENT
Start: 2022-01-04 | End: 2022-01-04

## 2022-01-04 RX ORDER — LIDOCAINE HYDROCHLORIDE 10 MG/ML
10 INJECTION INFILTRATION; PERINEURAL ONCE
Status: DISCONTINUED | OUTPATIENT
Start: 2022-01-04 | End: 2022-01-05 | Stop reason: HOSPADM

## 2022-01-04 RX ORDER — LANOLIN ALCOHOL/MO/W.PET/CERES
325 CREAM (GRAM) TOPICAL
Qty: 30 TABLET | Refills: 0 | Status: SHIPPED | OUTPATIENT
Start: 2022-01-04 | End: 2022-01-04

## 2022-01-04 RX ADMIN — SULFAMETHOXAZOLE AND TRIMETHOPRIM 1 TABLET: 800; 160 TABLET ORAL at 08:29

## 2022-01-04 RX ADMIN — FAMOTIDINE 20 MG: 20 TABLET ORAL at 08:29

## 2022-01-04 RX ADMIN — ACETAMINOPHEN 1000 MG: 500 TABLET ORAL at 03:21

## 2022-01-04 RX ADMIN — SIMETHICONE 80 MG: 80 TABLET, CHEWABLE ORAL at 05:29

## 2022-01-04 RX ADMIN — OXYCODONE HYDROCHLORIDE 10 MG: 5 TABLET ORAL at 08:45

## 2022-01-04 RX ADMIN — OXYCODONE HYDROCHLORIDE 5 MG: 5 TABLET ORAL at 03:20

## 2022-01-04 RX ADMIN — DOCUSATE SODIUM 100 MG: 100 CAPSULE ORAL at 08:29

## 2022-01-04 RX ADMIN — IBUPROFEN 800 MG: 400 TABLET, FILM COATED ORAL at 08:29

## 2022-01-04 NOTE — PROGRESS NOTES
OBGYN Progress note:    S: Pt is feeling better from post-op standpoint. Denies SOB or chest pain. No bleeding from incision. Incision is clean, dry, intact, healing well. Pain controlled with ibuprofen, tylenol, albertina. Pumping successfully. Anxious to go home. No longer requiring O2. O2 saturation 100% on RA this morning. O2 saturation 97-98% overnight on RA while sleeping. Great UOP. General:  Alert, cooperative, no distress. Head:  Normocephalic, without obvious abnormality, atraumatic. Eyes:  Conjunctivae/corneas clear. Extraocular movements intact. Lungs:   Non-labored respirations. CTAB. Chest wall:  No tenderness or deformity. Heart:  Regular rate, normal perfusion   Abdomen:   Soft, non-tender. No masses. No organomegaly. Incision c/d/i, healing well. No bleeding. Extremities: Extremities normal, atraumatic, no cyanosis or edema. Skin: Skin color, texture, turgor normal. No acute rashes or lesions. Neurologic: No focal deficits. Normal strength, sensation throughout. 30yo  admitted with post op hematoma 6d after , ant abdominal wall, POD2 incision and evacuation. Acute blood loss anemia: s/p 1u RBCs post-op and 1u RBCs 1/3/21. CBC pending this AM.    Post op pain: well controlled with PO meds    Cellulitis: suspect was secondary to hematoma. Hematoma evacuated. Cellulitis largely improve. Continue PO Bactrim. COVID- pt tested + prior to OR 21. Pt denies SOB. O2 saturation normal this morning and overnight on RA. O2 saturation normal last night while ambulating on RA per pt. Breastfeeding: All meds so far are breastfeeding compatible. Support lactation. Plan: Pt continues to show improvement in COVID sxs and O2 saturation. Abdominal exam is wnl and incision is healing well. Will order daily CBC. OK to discharge home from OBGYN standpoint if Hb is stable.  Defer COVID mgmt to hospitalist team. Yash Emerson will sign off, please re-consult with questions or concerns.       MD Sanam Sanchez with patient

## 2022-01-04 NOTE — PROGRESS NOTES
Discharge instructions reviewed with patient verbalizing understanding  All lines removed  Belongings packed at bedside  Discharge papers sent home with patient with no signed copy placed in patient's chart due to [place] restrictions

## 2022-01-04 NOTE — PROGRESS NOTES
Scheduled Ibuprofen 800 mg po given for generalized aches 4/10. Pt on room air. No distress with oxygen sat 98%. Pt pumping breast milk. Will monitor.

## 2022-01-04 NOTE — PROGRESS NOTES
Pt remains on room air this morning. Mild pain 3/10 and tenderness. Pt has been pumping breast milk tonight. No distress. Call light within reach. Will update oncoming nurse.

## 2022-01-04 NOTE — PROGRESS NOTES
BSR received  patient A/Ox  resting in bed  RR even/unlabored on RA   with NAD noted at this time  bed in lowest position wheels locked call light within reach  patient instructed to call for assistance when needed with understanding verbalized

## 2022-01-04 NOTE — PROGRESS NOTES
Bedside report received from Sanjuana Manning RN. No distress on 3L via NC. Instructed pt to call should needs arise. Pt voiced understanding.

## 2022-01-04 NOTE — PROGRESS NOTES
Care Management Interventions  PCP Verified by CM: Yes  Support Systems: Spouse/Significant Other  Confirm Follow Up Transport: Family  The Plan for Transition of Care is Related to the Following Treatment Goals : home with support  The Patient and/or Patient Representative was Provided with a Choice of Provider and Agrees with the Discharge Plan?: Yes  Name of the Patient Representative Who was Provided with a Choice of Provider and Agrees with the Discharge Plan: patient  Freedom of Choice List was Provided with Basic Dialogue that Supports the Patient's Individualized Plan of Care/Goals, Treatment Preferences and Shares the Quality Data Associated with the Providers?: Yes  [place] Resource Information Provided?: No  Discharge Location  Discharge Placement: Home with family assistance  CM did not identify any discharge needs.

## 2022-01-05 ENCOUNTER — HOME HEALTH ADMISSION (OUTPATIENT)
Dept: HOME HEALTH SERVICES | Facility: HOME HEALTH | Age: 32
End: 2022-01-05
Payer: COMMERCIAL

## 2022-01-05 ENCOUNTER — HOSPITAL ENCOUNTER (EMERGENCY)
Age: 32
Discharge: HOME | End: 2022-01-05
Attending: STUDENT IN AN ORGANIZED HEALTH CARE EDUCATION/TRAINING PROGRAM | Admitting: STUDENT IN AN ORGANIZED HEALTH CARE EDUCATION/TRAINING PROGRAM

## 2022-01-05 NOTE — DISCHARGE INSTRUCTIONS
Patient Education   you will f/u with wound care tomorrow. If you do not hear from your doc by mid day call wound care. [phone]. Wound Care: After Your Visit  Your Care Instructions  Taking good care of your wound at home will help it heal quickly and reduce your chance of infection. The doctor has checked you carefully, but problems can develop later. If you notice any problems or new symptoms, get medical treatment right away. Follow-up care is a key part of your treatment and safety. Be sure to make and go to all appointments, and call your doctor if you are having problems. It's also a good idea to know your test results and keep a list of the medicines you take. How can you care for yourself at home? · Clean the area with soap and water 2 times a day unless your doctor gives you different instructions. Don't use hydrogen peroxide or alcohol, which can slow healing. ¨ You may cover the wound with a thin layer of antibiotic ointment, such as bacitracin, and a nonstick bandage. ¨ Apply more ointment and replace the bandage as needed. · Take pain medicines exactly as directed. Some pain is normal with a wound, but do not ignore pain that is getting worse instead of better. You could have an infection. ¨ If the doctor gave you a prescription medicine for pain, take it as prescribed. ¨ If you are not taking a prescription pain medicine, ask your doctor if you can take an over-the-counter medicine. · Your doctor may have closed your wound with stitches (sutures), staples, or skin glue. ¨ If you have stitches, your doctor may remove them after several days to 2 weeks. Or you may have stitches that dissolve on their own. ¨ If you have staples, your doctor may remove them after 7 to 10 days. ¨ If your wound was closed with skin glue, the glue will wear off in a few days to 2 weeks. When should you call for help?   Call your doctor now or seek immediate medical care if:  · You have signs of infection, such as:  ¨ Increased pain, swelling, warmth, or redness near the wound. ¨ Red streaks leading from the wound. ¨ Pus draining from the wound. ¨ A fever. · You bleed so much from your incision that you soak one or more bandages over 2 to 4 hours. Watch closely for changes in your health, and be sure to contact your doctor if:  · The wound is not getting better each day. Where can you learn more? Go to [employer]  Enter M973 in the search box to learn more about \"Wound Care: After Your Visit. \"   © 3585-8036 [employer]wise, Incorporated. Care instructions adapted under license by [employer] (which disclaims liability or warranty for this information). This care instruction is for use with your licensed healthcare professional. If you have questions about a medical condition or this instruction, always ask your healthcare professional. [address]  any warranty or liability for your use of this information. Content Version: 82.7.998161;  Last Revised: April 23, 2012

## 2022-01-05 NOTE — PROGRESS NOTES
Pt to room [facility] for triage with chief complaint of \"incision opening up\" and large fluid coming out. .     Dr Luis Alfredo Monge called to assess patient.

## 2022-01-05 NOTE — PROGRESS NOTES
discharge instructions given to and reviewed with to. Pt voiced understanding. Ambulated to elevator in no distress. Pt states that Dr. Viola Cam gave her 2 Rx todd Oxy 5 and oxy 10 mg @ discharge from [facility] and states she dropped off at [address] [facility]. Pt states [facility] would not fill either Rx because \"something was wrong with MD number. This RN advised her that those Rx's would be cancelled. Pt voiced understanding.

## 2022-01-05 NOTE — DISCHARGE SUMMARY
Hospitalist Discharge Summary   Admit Date:  2022  5:53 PM   DC Note date: 2022  Name:  Ashok Morrison   Age:  33 y.o. Sex:  female  :  1990   MRN:  [id number]   Room:  [place] [address]  PCP:  Uriel Macdonald MD    Presenting Complaint: No chief complaint on file. Initial Admission Diagnosis: Post Op Comp      Problem List for this Hospitalization:  Hospital Problems as of 2022 Date Reviewed: 2022    None        Did Patient have Sepsis (YES OR NO): NO    Hospital Course:  Ashok Morrison is a 33 y.o. female with medical history of morbid obesity, BMI 46.3, gestational HTN, PCOS admitted on 22 by Ob-Gyn for post  abdominal wall hematoma. She does reports of known covid exposure around Emmy time, and most of her family members are covid positive. She c/o of cough which is mostly dry with exertional dyspnea which has been more pronounced over last 24-48 hoursPt is s/p I&D for abdominal wall hematoma and as per Dr. Alcides Medley, pt is doing well from surgical standpoint. Pt does have post op anemia with hemoglobin of 7 requiring blood transfusion and repeat hemoglobin of 8. Pt tested positive for COVID-19 on , noted to be hypoxic, requiring 3-4 ltr constantly via NC/mask. Her CRP and D-dimer are elevated but nonspecific due to pregnancy. She was weaned off on oxygen and is saturating well 97% on room air. She was treated with Decadron and incentive spirometry. She was treated with antibiotics for her cellulitis of the abdomen. Patient is hemodynamically stable and ready for discharge. For anemia continue taking iron supplementation and repeat CBC in 1 week. Stop taking ibuprofen. Further abdominal wall hematoma : Pain management with Percocet. Follow-up with OB/GYN and complete antibiotics Bactrim for 5 more days.   For Covid continue taking Decadron for 7 more days    Disposition: Home or Self Care  Diet: No diet orders on file  Code Status: Prior    Follow Up Orders:  No orders of the defined types were placed in this encounter. Follow-up Information     Follow up With Specialties Details Why [address] [facility]  [phone]            Follow up labs/diagnostics (ultimately defer to outpatient provider): Follow-up with PCP in 3 to 5 days  Follow-up with OB/GYN in 7 to 10 days    Time spent in patient discharge and coordination  minutes. Plan was discussed with 35mins. All questions answered. Patient was stable at time of discharge. Instructions given to call a physician or return if any concerns. Discharge Info:   Discharge Medication List as of 1/4/2022  8:23 PM      START taking these medications    Details   trimethoprim-sulfamethoxazole (BACTRIM DS, SEPTRA DS) 160-800 mg per tablet Take 1 Tablet by mouth two (2) times a day for 7 days. , Print, Disp-14 Tablet, R-0         CONTINUE these medications which have CHANGED    Details   oxyCODONE IR (ROXICODONE) 5 mg immediate release tablet Take 1 Tablet by mouth every four (4) hours as needed for Pain for up to 3 days. Max Daily Amount: 30 mg., Print, Disp-18 Tablet, R-0         CONTINUE these medications which have NOT CHANGED    Details   ferrous sulfate (IRON) 325 mg (65 mg iron) EC tablet Take 1 Tablet by mouth three (3) times daily (with meals) for 30 days. , Normal, Disp-90 Tablet, R-0      simethicone (MYLICON) 80 mg chewable tablet Take 1 Tablet by mouth Before breakfast, lunch, dinner and at bedtime., Normal, Disp-30 Tablet, R-0      calcium carbonate (TUMS) 200 mg calcium (500 mg) chew Take 1 Tablet by mouth daily. , Historical Med      omeprazole (PRILOSEC) 40 mg capsule Take 1 Capsule by mouth daily.  Indications: heartburn, Normal, Disp-30 Capsule, R-3      docosahexaenoic acid (Prenatal DHA) 200 mg cap capsule Prenatal + DHA   1 po q d, Historical Med         STOP taking these medications       ferrous sulfate 325 mg (65 mg iron) tablet Comments:   Reason for Stopping:         trimethoprim-sulfamethoxazole (BACTRIM, SEPTRA)  mg per tablet Comments:   Reason for Stopping:         ibuprofen (MOTRIN) 800 mg tablet Comments:   Reason for Stopping:         pen needle, diabetic (NovoFine Plus) 32 gauge x 1/6\" ndle Comments:   Reason for Stopping:         lancets (OneTouch Delica Plus Lancet) 30 gauge misc Comments:   Reason for Stopping:               Procedures done this admission:  * No surgery found *    Consults this admission:  None    Echocardiogram/EKG results:  No results found for this or any previous visit. EKG Results     None          Diagnostic Imaging/Tests:   No results found. All Micro Results     None          Labs: Results:       BMP, Mg, Phos Recent Labs     01/04/22  0840 01/03/22 0916    139   K 3.7 4.0   * 110*   CO2 23 25   AGAP 7 4*   BUN 9 8   CREA 0.82 0.78   CA 8.3 7.7*   GLU 72 90      CBC Recent Labs     01/04/22  0840 01/03/22  0916 01/02/22 2051 01/02/22 0920 01/02/22 0920   WBC 8.1 7.0  --   --   --    RBC 2.66* 2.35*  --   --   --    HGB 8.0* 7.1* 7.7*   < > 6.6*   HCT 26.0* 23.6*  --   --  21.9*    205  --   --   --    GRANS 56  --   --   --   --    LYMPH 35  --   --   --   --    EOS 0*  --   --   --   --    MONOS 8  --   --   --   --    BASOS 0  --   --   --   --    IG 1  --   --   --   --    ANEU 4.5  --   --   --   --    ABL 2.8  --   --   --   --    VENANCIO 0.0  --   --   --   --    ABM 0.7  --   --   --   --    ABB 0.0  --   --   --   --    AIG 0.1  --   --   --   --     < > = values in this interval not displayed.       LFT Recent Labs     01/03/22  0916   ALT 21   AP 75   TP 6.5   ALB 2.4*   GLOB 4.1*   AGRAT 0.6*      Cardiac Testing No results found for: BNPP, BNP, CPK, RCK1, RCK2, RCK3, RCK4, CKMB, CKNDX, CKND1, TROPT, TROIQ   Coagulation Tests Lab Results   Component Value Date/Time    Prothrombin time 12.6 01/01/2022 10:15 PM    INR 0.9 01/01/2022 10:15 PM    aPTT 27.3 01/01/2022 10:15 PM      A1c No results found for: HBA1C, THN1HUBX   Lipid Panel No results found for: CHOL, CHOLPOCT, CHOLX, CHLST, CHOLV, 977291, HDL, HDLP, LDL, LDLC, DLDLP, 761646, VLDLC, VLDL, TGLX, TRIGL, TRIGP, TGLPOCT, CHHD, CHHDX   Thyroid Panel No results found for: TSH, T4, FT4, TT3, T3U, TSHEXT     Most Recent UA No results found for: COLOR, APPRN, REFSG, JUAN MANUEL, PROTU, GLUCU, KETU, BILU, BLDU, UROU, SHAGGY, LEUKU, WBCU, RBCU, UEPI, BACTU, CASTS, UCRY, MUCUS, UCOM       All Labs from Last 24 Hrs:  Recent Results (from the past 24 hour(s))   CBC WITH AUTOMATED DIFF    Collection Time: 01/04/22  8:40 AM   Result Value Ref Range    WBC 8.1 4.3 - 11.1 K/uL    RBC 2.66 (L) 4.05 - 5.2 M/uL    HGB 8.0 (L) 11.7 - 15.4 g/dL    HCT 26.0 (L) 35.8 - 46.3 %    MCV 97.7 79.6 - 97.8 FL    MCH 30.1 26.1 - 32.9 PG    MCHC 30.8 (L) 31.4 - 35.0 g/dL    RDW 16.0 (H) 11.9 - 14.6 %    PLATELET 908 697 - 517 K/uL    MPV 10.7 9.4 - 12.3 FL    ABSOLUTE NRBC 0.00 0.0 - 0.2 K/uL    DF AUTOMATED      NEUTROPHILS 56 43 - 78 %    LYMPHOCYTES 35 13 - 44 %    MONOCYTES 8 4.0 - 12.0 %    EOSINOPHILS 0 (L) 0.5 - 7.8 %    BASOPHILS 0 0.0 - 2.0 %    IMMATURE GRANULOCYTES 1 0.0 - 5.0 %    ABS. NEUTROPHILS 4.5 1.7 - 8.2 K/UL    ABS. LYMPHOCYTES 2.8 0.5 - 4.6 K/UL    ABS. MONOCYTES 0.7 0.1 - 1.3 K/UL    ABS. EOSINOPHILS 0.0 0.0 - 0.8 K/UL    ABS. BASOPHILS 0.0 0.0 - 0.2 K/UL    ABS. IMM.  GRANS. 0.1 0.0 - 0.5 K/UL   METABOLIC PANEL, BASIC    Collection Time: 01/04/22  8:40 AM   Result Value Ref Range    Sodium 141 136 - 145 mmol/L    Potassium 3.7 3.5 - 5.1 mmol/L    Chloride 111 (H) 98 - 107 mmol/L    CO2 23 21 - 32 mmol/L    Anion gap 7 7 - 16 mmol/L    Glucose 72 65 - 100 mg/dL    BUN 9 6 - 23 MG/DL    Creatinine 0.82 0.6 - 1.0 MG/DL    GFR est AA >60 >60 ml/min/1.73m2    GFR est non-AA >60 >60 ml/min/1.73m2    Calcium 8.3 8.3 - 10.4 MG/DL       Current Med List in Hospital:   Current Facility-Administered Medications   Medication Dose Route Frequency    lidocaine (XYLOCAINE) 10 mg/mL (1 %) injection 1 mL  10 mg IntraDERMal ONCE     Current Outpatient Medications   Medication Sig    trimethoprim-sulfamethoxazole (BACTRIM DS, SEPTRA DS) 160-800 mg per tablet Take 1 Tablet by mouth two (2) times a day for 7 days.  oxyCODONE IR (ROXICODONE) 5 mg immediate release tablet Take 1 Tablet by mouth every four (4) hours as needed for Pain for up to 3 days. Max Daily Amount: 30 mg.    ferrous sulfate (IRON) 325 mg (65 mg iron) EC tablet Take 1 Tablet by mouth three (3) times daily (with meals) for 30 days.  simethicone (MYLICON) 80 mg chewable tablet Take 1 Tablet by mouth Before breakfast, lunch, dinner and at bedtime.  calcium carbonate (TUMS) 200 mg calcium (500 mg) chew Take 1 Tablet by mouth daily.  omeprazole (PRILOSEC) 40 mg capsule Take 1 Capsule by mouth daily. Indications: heartburn (Patient not taking: Reported on 12/27/2021)    docosahexaenoic acid (Prenatal DHA) 200 mg cap capsule Prenatal + DHA   1 po q d       No Known Allergies  Immunization History   Administered Date(s) Administered    COVID-19, PFIZER, MRNA, LNP-S, PF, 30MCG/0.3ML DOSE 03/22/2021, 04/14/2021    Influenza Vaccine 12/01/2014    Tdap 12/19/2019       Recent Vital Data:  Patient Vitals for the past 24 hrs:   Temp Pulse Resp BP SpO2   01/04/22 1906 98.9 °F (37.2 °C) 91 18 137/76 99 %     Oxygen Therapy  O2 Sat (%): 99 % (01/04/22 1906)    Estimated body mass index is 46.37 kg/m² as calculated from the following:    Height as of 1/2/22: 5' 9\" (1.753 m). Weight as of 1/2/22: 142.4 kg (314 lb). No intake or output data in the 24 hours ending 01/04/22 2233      Physical Exam:  General:    Well nourished. No overt distress  Head:  Normocephalic, atraumatic  Eyes:  Sclerae appear normal.  Pupils equally round. HENT:  Nares appear normal, no drainage. Moist mucous membranes  Neck:  No restricted ROM. Trachea midline  CV:   RRR. No m/r/g. No JVD  Lungs:   CTAB. No wheezing, rhonchi, or rales. Even, unlabored  Abdomen:   Soft, nontender, nondistended. Rash is  improving  Extremities: Warm and dry. No cyanosis or clubbing. No edema. Skin:     . Normal coloration  Neuro:  CN II-XII grossly intact. Psych:  Normal mood and affect. Signed:  Yonatan Martin MD    Part of this note may have been written by using a voice dictation software. The note has been proof read but may still contain some grammatical/other typographical errors.

## 2022-01-05 NOTE — PROGRESS NOTES
SBAR received from Mata Espinoza RN. Care assumed. 1945 To bedside with Dr. Angel Cannon and [address] Bedside IND performed. Wound packed wet to dry with 4x4 and tape pressure dressing applied. .    Bactrim DS (1 tab BID X 7 days #14tabs) and Oxy IR 5 mg (one tab Q 4H PRN #18tabs) given to patient to take to pharmacy to have filled. Per pt, she dropped off her last Rx and was unable to pick them up due to being hospitalized. Now the pharmacy is closed. New Rx sent to different pharmacy that is open until 2200 tonight. Will ask Dr. Ortiz Never cancel all other Rx for pain medication. Discussed with Dr. Bryce Conn. She agreed.

## 2022-01-05 NOTE — H&P
History & Physical    Name: Winston Munson MRN: [id number]  SSN: [id number]    YOB: 1990  Age: 33 y.o. Sex: female      Subjective:     CC: Wound seroma    History of Present Illness: Ms. Jessica Dupont is a 33 y.o.  female who presented last night with profuse serous fluid coming from her incision. She is POD #3 s/p rectus sheath hematoma incision and evacuation. Wound seroma was diagnosed at bedside. Incision had no e/o infection and pt had normal VS, afebrile. Wound incision was re-opened at bedside and wet-to-dry dressing was placed. Incision base had healthy granulation tissue, no e/o infection or necrosis. Fascia was intact and healthy. Pt follows up today for dressing change. Her  accompanies her to learn how to perform dressing changes at home.      OB History    Para Term  AB Living   5 3 3   2 3   SAB IAB Ectopic Molar Multiple Live Births           0 2      # Outcome Date GA Lbr Shaka/2nd Weight Sex Delivery Anes PTL Lv   5 Term 21 37w1d  3.32 kg F CS-LTranv Spinal N PRAKASH   4 Term 19 39w2d  3.44 kg M CS-LTranv EPI N PRAKASH      Complications: Dysfunctional Labor, Fetal Intolerance, Failure to Progress in First Stage   3 Term 08 40w0d  4.961 kg F Vag-Spont None N    2 AB            1 AB              Past Medical History:   Diagnosis Date    39 weeks gestation of pregnancy 12/15/2019    Diet controlled gestational diabetes mellitus (GDM) in second trimester 2021    Disorder of pregnancy 2021    Encounter for planned induction of labor 12/15/2019    Gestational hypertension     History of elective  8/1/2016    x2    Hydronephrosis of fetus in [place] pregnancy, antepartum 10/7/2021    Hypertension     PCOS (polycystic ovarian syndrome)      Past Surgical History:   Procedure Laterality Date    HX OTHER SURGICAL  2014    EAB x2    HX TONSILLECTOMY  2010    HX WISDOM TEETH EXTRACTION       Social History Occupational History    Not on file   Tobacco Use    Smoking status: Never Smoker    Smokeless tobacco: Never Used   Substance and Sexual Activity    Alcohol use: No    Drug use: No    Sexual activity: Yes     Partners: Male     Birth control/protection: None     Comment: trying to conceive      Family History   Problem Relation Age of Onset    Hypertension Maternal Grandmother        No Known Allergies  Prior to Admission medications    Medication Sig Start Date End Date Taking? Authorizing Provider   ferrous sulfate (IRON) 325 mg (65 mg iron) EC tablet Take 1 Tablet by mouth three (3) times daily (with meals) for 30 days. 1/4/22 2/3/22  Yee Bran MD   simethicone (MYLICON) 80 mg chewable tablet Take 1 Tablet by mouth Before breakfast, lunch, dinner and at bedtime. 1/4/22   Yee Bran MD   trimethoprim-sulfamethoxazole (BACTRIM DS, SEPTRA DS) 160-800 mg per tablet Take 1 Tablet by mouth two (2) times a day for 7 days. 1/4/22 1/11/22  Margarete Bamberger, MD   oxyCODONE IR (ROXICODONE) 5 mg immediate release tablet Take 1 Tablet by mouth every four (4) hours as needed for Pain for up to 3 days. Max Daily Amount: 30 mg. 1/4/22 1/7/22  Bret ZAMUDIO MD   dexAMETHasone (DECADRON) 6 mg tablet Take 1 Tablet by mouth Daily (before breakfast) for 7 days. 1/5/22 1/12/22  Yee Bran MD   calcium carbonate (TUMS) 200 mg calcium (500 mg) chew Take 1 Tablet by mouth daily. Provider, Historical   omeprazole (PRILOSEC) 40 mg capsule Take 1 Capsule by mouth daily. Indications: heartburn  Patient not taking: Reported on 12/27/2021 11/3/21   Feroz Hinds MD   docosahexaenoic acid (Prenatal DHA) 200 mg cap capsule Prenatal + DHA   1 po q d    Provider, Historical        Review of Systems:  A comprehensive review of systems was negative except for that written in the History of Present Illness. Objective:     Vitals: There were no vitals filed for this visit. No data recorded.     No data recorded Physical Exam:  Patient without distress. Heart: Regular rate and rhythm  Lung: clear to auscultation throughout lung fields, no wheezes, no rales, no rhonchi and normal respiratory effort  Abdomen: soft, nontender  Incision: dressing change performed. Healthy granulation tissue noted in subcutaneous space and would base. Fascia remained intact. Wet to dry dressing was placed. No purulence or necrosis noted. Lab/Data Review:  No results found for this or any previous visit (from the past 24 hour(s)). Assessment and Plan: Active Problems:    * No active hospital problems. *    1. Postpartum state  2. Wound seroma  3. POD#3 s/p rectus sheath hematoma incision and evacuation    Plan:  [facility] is working on obtaining at-home wound vac for pt. Home health is scheduled to come to pt's house on Saturday 1/8/21 for wound assessment and dressing change. Pt has appt on Monday 1/10/21 at 0800 with wound care clinic. Pt's  was instructed on how to perform dressing changes. Pt is aware she is to continue daily dressing changes until wound vac can be placed.       MD Sanam Erickson

## 2022-01-07 ENCOUNTER — HOME CARE VISIT (OUTPATIENT)
Dept: SCHEDULING | Facility: HOME HEALTH | Age: 32
End: 2022-01-07
Payer: COMMERCIAL

## 2022-01-07 PROCEDURE — 400013 HH SOC

## 2022-01-07 PROCEDURE — G0299 HHS/HOSPICE OF RN EA 15 MIN: HCPCS

## 2022-01-08 ENCOUNTER — HOME CARE VISIT (OUTPATIENT)
Dept: SCHEDULING | Facility: HOME HEALTH | Age: 32
End: 2022-01-08
Payer: COMMERCIAL

## 2022-01-08 PROCEDURE — G0299 HHS/HOSPICE OF RN EA 15 MIN: HCPCS

## 2022-01-09 VITALS
RESPIRATION RATE: 18 BRPM | OXYGEN SATURATION: 97 % | SYSTOLIC BLOOD PRESSURE: 140 MMHG | HEART RATE: 78 BPM | TEMPERATURE: 98 F | DIASTOLIC BLOOD PRESSURE: 86 MMHG

## 2022-01-10 ENCOUNTER — HOSPITAL ENCOUNTER (OUTPATIENT)
Dept: WOUND CARE | Age: 32
Discharge: HOME | End: 2022-01-10
Attending: SURGERY
Payer: COMMERCIAL

## 2022-01-10 VITALS
OXYGEN SATURATION: 99 % | SYSTOLIC BLOOD PRESSURE: 128 MMHG | WEIGHT: 293 LBS | HEART RATE: 88 BPM | BODY MASS INDEX: 43.4 KG/M2 | HEIGHT: 69 IN | TEMPERATURE: 98.6 F | DIASTOLIC BLOOD PRESSURE: 93 MMHG | RESPIRATION RATE: 18 BRPM

## 2022-01-10 VITALS
RESPIRATION RATE: 19 BRPM | SYSTOLIC BLOOD PRESSURE: 126 MMHG | BODY MASS INDEX: 46.37 KG/M2 | HEART RATE: 72 BPM | OXYGEN SATURATION: 95 % | TEMPERATURE: 98.2 F | DIASTOLIC BLOOD PRESSURE: 82 MMHG | HEIGHT: 69 IN

## 2022-01-10 DIAGNOSIS — E66.01 SEVERE OBESITY (BMI >= 40) (HCC): ICD-10-CM

## 2022-01-10 DIAGNOSIS — T81.89XA NON-HEALING SURGICAL WOUND, INITIAL ENCOUNTER: ICD-10-CM

## 2022-01-10 DIAGNOSIS — T81.49XA POSTOPERATIVE WOUND CELLULITIS: ICD-10-CM

## 2022-01-10 PROCEDURE — 97607 NEG PRS WND THR NDME<=50SQCM: CPT

## 2022-01-10 PROCEDURE — 99204 OFFICE O/P NEW MOD 45 MIN: CPT | Performed by: SURGERY

## 2022-01-10 PROCEDURE — 99213 OFFICE O/P EST LOW 20 MIN: CPT

## 2022-01-10 RX ORDER — FUROSEMIDE 20 MG/1
20 TABLET ORAL DAILY
COMMUNITY

## 2022-01-10 RX ORDER — OXYCODONE HYDROCHLORIDE 5 MG/1
5 TABLET ORAL
COMMUNITY

## 2022-01-10 NOTE — PROGRESS NOTES
[address]  Consult Note/H&P/Progress Note      Елена Cardona  MEDICAL RECORD NUMBER:  [id number]  AGE: 33 y.o. RACE BLACK/  GENDER: female  : 1990  EPISODE DATE:  1/10/2022       Subjective:      CC (Chief Complaint) and HISTORY of PRESENT ILLNESS (HPI):    Елена Cardona is a 33 y.o. female who presents today for wound/ulcer evaluation. She is severely obese with BMI of 44.23 kg/m². She weighs 299 pounds. She underwent repeat  by Dr. María Street on 2021. It was a difficult repeat incision and was complicated by hematoma requiring evacuation by Dr. Everardo Beal on 2022. Follow-up identified a large seroma and wound was opened for wet-to-dry dressings. That follow-up visit was on 2022. Wound care consult was placed for wound VAC which has been placed by home health. She comes for her first visit to the wound center on 1/10/2022.   Wound VAC is being changed 3 times per week with gray foam.      This information was obtained from the patient  The following HPI elements were documented for the patient's wound:  Location: Lower abdomen Pfannenstiel incision  Duration: 2022  Severity:  +++  Context: Disrupted  wound with hematoma following  2021  Modifying Factors:  Nothing makes better or worse  Quality:    Timing:     Associated Signs and Symptoms: Severe obesity, wound hematoma      Ulcer Identification:  Ulcer Type: non-healing surgical    Contributing Factors: diabetes and obesity        PAST MEDICAL HISTORY  Past Medical History:   Diagnosis Date    39 weeks gestation of pregnancy 12/15/2019    Delivery with history of  2021    Diet controlled gestational diabetes mellitus (GDM) in second trimester 2021    Disorder of pregnancy 2021    Encounter for planned induction of labor 12/15/2019    Gestational hypertension     History of elective  8/1/2016    x2    Hydronephrosis of fetus in kenney pregnancy, antepartum 10/7/2021    Hypertension     PCOS (polycystic ovarian syndrome)         PAST SURGICAL HISTORY  Past Surgical History:   Procedure Laterality Date    HX OTHER SURGICAL  4/2014    EAB x2    HX TONSILLECTOMY  12/2010    HX WISDOM TEETH EXTRACTION         FAMILY HISTORY  Family History   Problem Relation Age of Onset    Hypertension Maternal Grandmother        SOCIAL HISTORY  Social History     Tobacco Use    Smoking status: Never Smoker    Smokeless tobacco: Never Used   Substance Use Topics    Alcohol use: No    Drug use: No       ALLERGIES  No Known Allergies    MEDICATIONS  Current Outpatient Medications on File Prior to Encounter   Medication Sig Dispense Refill    oxyCODONE IR (ROXICODONE) 5 mg immediate release tablet Take 5 mg by mouth every four (4) hours as needed for Pain.  furosemide (Lasix) 20 mg tablet Take 20 mg by mouth daily. 2 tabs DAILY x 5 day; on DAY 2/5      ferrous sulfate (IRON) 325 mg (65 mg iron) EC tablet Take 1 Tablet by mouth three (3) times daily (with meals) for 30 days. 90 Tablet 0    trimethoprim-sulfamethoxazole (BACTRIM DS, SEPTRA DS) 160-800 mg per tablet Take 1 Tablet by mouth two (2) times a day for 7 days. 14 Tablet 0    dexAMETHasone (DECADRON) 6 mg tablet Take 1 Tablet by mouth Daily (before breakfast) for 7 days. (Patient taking differently: Take 6 mg by mouth Daily (before breakfast). NOT TAKING) 7 Tablet 0    calcium carbonate (TUMS) 200 mg calcium (500 mg) chew Take 1 Tablet by mouth daily.  omeprazole (PRILOSEC) 40 mg capsule Take 1 Capsule by mouth daily. Indications: heartburn (Patient not taking: Reported on 12/27/2021) 30 Capsule 3    docosahexaenoic acid (Prenatal DHA) 200 mg cap capsule Prenatal + DHA   1 po q d       No current facility-administered medications on file prior to encounter.          REVIEW OF SYSTEMS  The patient has no difficulty with chest pain or shortness of breath. No fever or chills. Comprehensive review of systems was otherwise unremarkable except as noted above. Objective:   Physical Exam:   Recent vitals (if inpt):  Patient Vitals for the past 24 hrs:   BP Temp Pulse Resp SpO2 Height Weight   01/10/22 0839 (!) 128/93 98.6 °F (37 °C) 88 18 99 % 5' 9\" (1.753 m) 299 lb 8 oz (135.9 kg)       Visit Vitals  BP (!) 128/93 (BP 1 Location: Right arm, BP Patient Position: Sitting)   Pulse 88   Temp 98.6 °F (37 °C)   Resp 18   Ht 5' 9\" (1.753 m)   Wt 299 lb 8 oz (135.9 kg)   LMP 01/20/2021   SpO2 99%   BMI 44.23 kg/m²     Wt Readings from Last 3 Encounters:   01/10/22 299 lb 8 oz (135.9 kg)   01/02/22 314 lb (142.4 kg)   12/27/21 318 lb (144.2 kg)     Constitutional: Alert, oriented, cooperative patient in no acute distress; appears stated age;  General appearance is within normal limits for wound care patient population. See the today's recorded vitals signs and constitutional data. Eyes: Pupils equal; Sclera are clear. EOMs intact; The eyes appear to track and move normally. The sclera are not injected. The conjunctive are clear. The eyelids are normal. There is no scleral icterus. ENMT: There are no obvious external ear, nose, lip or mouth lesions. Nares normal; Neck: Overall contour of the neck is normal with no obvious neck masses. Gross hearing is within normal limits. No obvious neck masses  Resp:  Breathing is non-labored; normal rate and effort; no audible wheezing. CV: RRR;  no JVD; No evidence of cyanosis of the upper extremities. The extremities are perfused without embolic sign, splinter hemorrhages, or petechia. GI: Obese, soft and non-distended without acute abnormality noted. Musculoskeletal: unremarkable with normal function. No embolic signs or cyanosis. Neuro:  Oriented; moves all 4; no focal deficits  Psychiatric: Judgement and insight are within normal limits for the wound care population of patients.  Patient is oriented to person, place, and time. Recent and remote memory are within normal limits. Mood and affect are within normal limits. Integumentary (Skin/Wounds)  See inspection of wound(s) below. There are no other skin areas of palpable concern. See wound center documentation including photos in the [facility] [address] Wound Template made part of this record by reference. Wounds:  Wound Abdomen (Active)   Number of days: 755       Wound Abdomen #1 01/10/22 (Active)   Wound Image   01/10/22 0914   Wound Etiology Non-Healing Surgical 01/10/22 0914   Dressing Status Old drainage noted 01/10/22 0914   Cleansed Cleansed with saline 01/10/22 0914   Dressing/Treatment Negative Pressure Wound Therapy 01/10/22 0914   Wound Length (cm) 2.8 cm 01/10/22 0914   Wound Width (cm) 18.8 cm 01/10/22 0914   Wound Depth (cm) 1.3 cm 01/10/22 0914   Wound Surface Area (cm^2) 52.64 cm^2 01/10/22 0914   Wound Volume (cm^3) 68.432 cm^3 01/10/22 0914   Distance Tunneling (cm) 1.1 cm 01/10/22 0914   Direction of Tunnel 9 o'clock 01/10/22 0914   Wound Assessment Granulation tissue 01/10/22 0914   Drainage Amount Large 01/10/22 0914   Drainage Description Serosanguinous 01/10/22 0914   Wound Odor None 01/10/22 0914   Tracy-Wound/Incision Assessment Edematous 01/10/22 0914   Wound Thickness Description Full thickness 01/10/22 0914   Number of days: 0       Incision 12/27/21 Abdomen (Active)   Number of days: 14       Incision 01/02/22 Abdomen (Active)   Number of days: 8          Amount and/or Complexity of Data Reviewed and Analyzed:  I reviewed and analyzed all of the unique labs and radiologic studies that are shown below as well as any that are in the HPI, and any that are in the expanded problem list below  *Each unique test, order, or document contributes to the combination of 2 or combination of 3 in Category 1 below.   I also independently reviewed radiology images for studies I described above in the HPI or studies I have ordered. For this visit I also reviewed old records and prior notes. No results for input(s): WBC, HGB, PLT, NA, K, CL, CO2, BUN, CREA, GLU, PTP, INR, APTT, TBIL, TBILI, CBIL, ALT, AP, AML, AML, LPSE, LCAD, NH4, TROPT, TROIQ, PCO2, PO2, HCO3, HGBEXT, PLTEXT, INREXT, HGBEXT, PLTEXT, INREXT in the last 72 hours. No lab exists for component: SGOT, GPT,  PH  No results for input(s): PTP, INR, APTT, TBIL, TBILI, CBIL, ALT, AP, AML, LPSE, INREXT, INREXT in the last 72 hours. No lab exists for component: SGOT, GPT    Most recent blood counts (CBC) review  Lab Results   Component Value Date/Time    WBC 8.1 01/04/2022 08:40 AM    Hemoglobin (POC) 11.8 10/05/2017 08:48 AM    HGB 8.0 (L) 01/04/2022 08:40 AM    HCT 26.0 (L) 01/04/2022 08:40 AM    PLATELET 197 87/18/5125 08:40 AM    MCV 97.7 01/04/2022 08:40 AM     Most recent chemistry (BMP) test review   Lab Results   Component Value Date/Time    Sodium 141 01/04/2022 08:40 AM    Potassium 3.7 01/04/2022 08:40 AM    Chloride 111 (H) 01/04/2022 08:40 AM    CO2 23 01/04/2022 08:40 AM    Anion gap 7 01/04/2022 08:40 AM    Glucose 72 01/04/2022 08:40 AM    BUN 9 01/04/2022 08:40 AM    Creatinine 0.82 01/04/2022 08:40 AM    GFR est AA >60 01/04/2022 08:40 AM    GFR est non-AA >60 01/04/2022 08:40 AM    Calcium 8.3 01/04/2022 08:40 AM     Most recent Liver enzyme test review  Lab Results   Component Value Date/Time    ALT (SGPT) 21 01/03/2022 09:16 AM    AST (SGOT) 14 (L) 01/03/2022 09:16 AM    Alk.  phosphatase 75 01/03/2022 09:16 AM    Bilirubin, total 0.4 01/03/2022 09:16 AM     Most recent coagulation (coags) review  Lab Results   Component Value Date/Time    INR 0.9 01/01/2022 10:15 PM    Prothrombin time 12.6 01/01/2022 10:15 PM     Lab Results   Component Value Date/Time    INR 0.9 01/01/2022 10:15 PM    aPTT 27.3 01/01/2022 10:15 PM       Diabetic assessment  No results found for: HBA1C, WFI0EFVK, JLN7WQBU, TYB4JVKR    Nutritional assessment screen to assess wound healing ability:  Lab Results   Component Value Date/Time    Protein, total 6.5 01/03/2022 09:16 AM    Albumin 2.4 (L) 01/03/2022 09:16 AM     Lab Results   Component Value Date/Time    Protein, total 6.5 01/03/2022 09:16 AM    Albumin 2.4 (L) 01/03/2022 09:16 AM       XR Results (most recent):  Results from Hospital Encounter encounter on 01/01/22    XR CHEST SNGL V    Narrative  Exam: XR CHEST SNGL V on 1/3/2022 1:14 PM    Clinical History: The Female patient is 33years old  presenting for COVID/ SOB. Comparison:  none    Findings:  Frontal view of the chest was obtained. The lung fields are clear. No pleural effusions are demonstrated. The  cardiomediastinal silhouette is within normal limits. There are no acute  osseous abnormalities. Impression  1. No acute cardiopulmonary process. CPT code(s) 01152      CT Results (most recent):  Results from Hospital Encounter encounter on 01/01/22    CT ABD PELV WO CONT    Narrative  CT ABD PELV WO CONT 1/1/2022 11:41 PM    HISTORY: Hematoma      TECHNIQUE: CT scan of the abdomen and pelvis was performed without IV contrast.  Multiplanar reformats were obtained. Dose reduction techniques were used. CONTRAST: None. FINDINGS:  LOWER CHEST: Normal.    HEPATOBILIARY: Normal.    PANCREAS: Normal.    SPLEEN: Normal.    ADRENAL GLANDS: Normal.    KIDNEYS/BLADDER: No hydronephrosis or renal calculi. Bladder is decompressed but  otherwise unremarkable. BOWEL: No bowel obstruction or diverticulitis. Appendix not visualized. LYMPH NODES: No enlarged abdominal or pelvic lymph nodes. VASCULATURE: Unremarkable. PELVIC ORGANS: Gravid uterus is noted. Anterior abdominal wall hematoma present  on image 74 of series 2 measuring 17.5 cm transverse dimension, 10.6 cm AP  dimension and 6.3 cm craniocaudal dimension. No free intrapelvic fluid. MUSCULOSKELETAL: Normal.    Impression  1. Large lower anterior abdominal wall hematoma.     2.  No acute intra-abdominal or pelvic abnormality. Admission date (for inpatients): 1/10/2022   * No surgery found *  * No surgery found *    Assessment:      Problem List Items Addressed This Visit     Severe obesity (BMI >= 40) (HCC)    Relevant Medications    furosemide (Lasix) 20 mg tablet    Postpartum  wound disruption    Postoperative wound cellulitis    Wound hematoma following  section, postpartum    Nonhealing surgical wound          Problem List  Date Reviewed: 2022          Codes Class Noted    Nonhealing surgical wound ICD-10-CM: T81.89XA  ICD-9-CM: 998.83  1/10/2022        COVID ICD-10-CM: U07.1  ICD-9-CM: 079.89  2022        Acute respiratory failure with hypoxia ([facility] [address] ICD-10-CM: J96.01  ICD-9-CM: 518.81  1/3/2022        COVID-19 virus infection ICD-10-CM: U07.1  ICD-9-CM: 079.89  1/3/2022        Wound hematoma following  section, postpartum ICD-10-CM: O90.2  ICD-9-CM: 674.34  2022        Postpartum  wound disruption ICD-10-CM: O90.0  ICD-9-CM: 674.14  2022        Postoperative wound cellulitis ICD-10-CM: T81.49XA  ICD-9-CM: 998.59  2022        37 weeks gestation of pregnancy ICD-10-CM: Z3A.37  ICD-9-CM: V22.2  2021        Noncompliant pregnant patient in third trimester ICD-10-CM: O09.893, Z91.19  ICD-9-CM: V23.89, V15.81  2021    Overview Addendum 2021  8:38 AM by Elodia Mcgowan     2021 at [facility]: Pt has to be prompted to send blood sugar logs. Most recently, pt stated that she sends them 'before her appointments'. It has been stated to her verbally and in print that logs are to be sent weekly for review in order to adjust her insulin. Last log received was 11/3/2021. At today's appointment, pt only had 6 days worth. Stressed again the importance of compliance to patient. Hydronephrosis of fetus in kenney pregnancy, antepartum ICD-10-CM: O35. 8XX0  ICD-9-CM: 655.83  10/7/2021    Overview Addendum 2021  9:04 AM by Rd Antunez, Shania CORRIGAN     10/7/2021 at [facility]: Mild, bilateral seen today; Rt 4.7 mm, Lt 5.9 mm  12/2/2021 at [facility]: Resolved                 Chronic hypertension affecting pregnancy ICD-10-CM: O10.919  ICD-9-CM: 642.00  8/25/2021    Overview Addendum 12/2/2021 10:58 AM by Michael Salamanca MD     8/25/2021 at [facility]: 162/62, 150/82  10/7/2021 at [facility]: Pt reports that she had stopped her Procardia per her OB d/t HAs and that her readings there were consistently WNL, was only on it for apprx. 2 weeks, BP today WNL. 11/3/2021 at [facility]: BP elevated today - pt reports dizziness/blurry vision when she 'feels her BP is up'. No longer taking procardia due to HA. Start labetalol 200 mg BID.  12/2/2021 at [facility]: BP elevated today but not severe, no HAs. Increase Labetalol to bid then tid as needed. .    · Continue twice weekly testing. · Delivery 37-39 wk due to [facility] on meds. Sooner if severe disease. · Get preeclamptic labs in your office for any elevated BPs or symptoms:  (CBC, CMP, LDH, Uric Acid); also do P/C ratio (if elevated, need to then confirm with 24h urine) or 24 hour urine (for total protein and creatinine clearance). High-risk pregnancy in third trimester ICD-10-CM: O09.93  ICD-9-CM: V23.9  8/24/2021    Overview Addendum 12/2/2021  9:01 AM by Shania Brizuela     LOW RISK NIPT; AFP negative    10/7/2021 at [facility]: Normal anatomy, limited echo d/t MBH. AC 58%, Overall 56%, DELFIN appears generous. Mild bilateral hydronephrosis seen today; Rt kidney 4.7 mm, Lt kidney 5.9 mm.  11/3/2021 at [facility]: Appropriate fetal growth AC 76%, Overall 75%, DELFIN 24 cm (generous),  BPP 8/8.  12/2/2021 at [facility]: Accelerated fetal growth AC 82%, Overall 72%, DELFIN 22cm, UA Dopplers WNL, BPP 8/8. · See GDM Overview for log review and recommendations.   · Scheduled repeat C/S on 12/27/2021  · Continue twice weekly testing with primary OB             Obesity affecting pregnancy in third trimester ICD-10-CM: O99.213  ICD-9-CM: 649.13  8/24/2021 Severe obesity (BMI >= 40) ([facility]) ICD-10-CM: E66.01  ICD-9-CM: 278.01  2021        Insulin controlled gestational diabetes mellitus (GDM) in third trimester ICD-10-CM: O24.414  ICD-9-CM: 648.83  2021    Overview Addendum 2021 11:07 AM by Megan Morataya MD     ....... 2021: Most readings elevated, adjust dose to LA Insulin .  10/7/2021 at [facility]: Glucose log reviewed. Ranges from 92 to 144. Most readings mildly elevated, improved. Increase dose to LA Insulin .   11/3/2021 at [facility]: Glucose log reviewed. Ranges from 95 to 138. Most all readings elevated, adjust dose. Levemir .   2021 at [facility]: Pt brought 6 days worth of readings. Ranges from [address] [facility]. Levemir . · Adjust insulin PRN remotely when sends in BS  · Continue wice weekly testing in OB office. · Repeat C/S at 39 weeks. · No follow up at [facility] CT made. H/O  section complicating pregnancy [id number]: O34.219  ICD-9-CM: 654.20  2021        Polycystic ovary affecting pregnancy, antepartum ICD-10-CM: O34.80, E28.2  ICD-9-CM: 646.83, 256.4  2020              Active Problems:    * No active hospital problems. *          Plan:     Number and Complexity of Problems addressed and   Risks of complications and/or morbidity of management    Treatment Note please see attached Discharge Instructions  Any procedures done today in the wound center are documented in a separate note in connect care made part of this record by reference  Written patient dismissal instructions given to patient or POA. Nonhealing surgical wound  Postpartum  wound disruption  Wound hematoma following  section, postpartum  Severe obesity (BMI >= 40) ([facility] [address]    She presents to the wound center on 1/10/2022. She had  on 2021 and hematoma evacuation on 2022. Wound VAC was recently placed and she is having home health change her back to 3 times per week.   She is near completion on a 7-day course of Bactrim. There is no evidence of infection. She does not need to continue her antibiotics beyond this course. I do not find any culture results. Her wound is nicely granulating and pink. Suspect this will close quickly with the wound VAC. She will continue with home health VAC changes 3 times per week. I will see her back in 3 weeks. Wound Care  Orders Placed This Encounter    WOUND CARE, DRESSING CHANGE     Abdominal Wound:  Cleanse wound and periwound with wound cleanser or normal saline. NPWT 125 continuous, Black Granufoam.  Dressing change 3x weekly. Home Health for dressing changes. Standing Status:   Standing     Number of Occurrences:   1    oxyCODONE IR (ROXICODONE) 5 mg immediate release tablet     Sig: Take 5 mg by mouth every four (4) hours as needed for Pain.  furosemide (Lasix) 20 mg tablet     Sig: Take 20 mg by mouth daily. 2 tabs DAILY x 5 day; on DAY 2/5       Follow-up Information     Follow up With Specialties Details Why Contact Info    [address] In 3 weeks [facility] [place] [address] [facility] [address] [phone]                  Level of MDM (2/3 elements below)  Number and Complexity of Problems Addressed Amount and/or Complexity of Data to be Reviewed and Analyzed  *Each unique test, order, or document contributes to the combination of 2 or combination of 3 in Category 1 below.  Risk of Complications and/or Morbidity or Mortality of pt Management     31683  30755 SF Minimal  1self-limited or minor problem Minimal or none Minimal risk of morbidity from additional diagnostic testing or Rx   24070  75381 Low Low  2or more self-limited or minor problems;    or  1stable chronic illness;    or  5WZAFK, uncomplicated illness or injury   Limited  (Must meet the requirements of at least 1 of the 2 categories)  Category 1: Tests and documents   Any combination of 2 from the following:  Review of prior external note(s) from each unique source*;  review of the result(s) of each unique test*;   ordering of each unique test*    or   Category 2: Assessment requiring an independent historian(s)  (For the categories of independent interpretation of tests and discussion of management or test interpretation, see moderate or high) Low risk of morbidity from additional diagnostic testing or treatment     97665  14044 Mod Moderate  1or more chronic illnesses with exacerbation, progression, or side effects of treatment;    or  2or more stable chronic illnesses;    or  1undiagnosed new problem with uncertain prognosis;    or  1acute illness with systemic symptoms;    or  7SUAOH complicated injury   Moderate  (Must meet the requirements of at least 1 out of 3 categories)  Category 1: Tests, documents, or independent historian(s)  Any combination of 3 from the following:   Review of prior external note(s) from each unique source*;  Review of the result(s) of each unique test*;  Ordering of each unique test*;  Assessment requiring an independent historian(s)    or  Category 2: Independent interpretation of tests   Independent interpretation of a test performed by another physician/other qualified health care professional (not separately reported);     or  Category 3: Discussion of management or test interpretation  Discussion of management or test interpretation with external physician/other qualified health care professional/appropriate source (not separately reported)   Moderate risk of morbidity from additional diagnostic testing or treatment  Examples only:  Prescription drug management   Decision regarding minor surgery with identified patient or procedure risk factors  Decision regarding elective major surgery without identified patient or procedure risk factors   Diagnosis or treatment significantly limited by social determinants of health       54321  60438 High High  1or more chronic illnesses with severe exacerbation, progression, or side effects of treatment;    or  1 acute or chronic illness or injury that poses a threat to life or bodily function   Extensive  (Must meet the requirements of at least 2 out of 3 categories)  Category 1: Tests, documents, or independent historian(s)  Any combination of 3 from the following:   Review of prior external note(s) from each unique source*;  Review of the result(s) of each unique test*;   Ordering of each unique test*;   Assessment requiring an independent historian(s)    or   Category 2: Independent interpretation of tests   Independent interpretation of a test performed by another physician/other qualified health care professional (not separately reported);     or  Category 3: Discussion of management or test interpretation  Discussion of management or test interpretation with external physician/other qualified health care professional/appropriate source (not separately reported)   High risk of morbidity from additional diagnostic testing or treatment  Examples only:  Drug therapy requiring intensive monitoring for toxicity  Decision regarding elective major surgery with identified patient or procedure risk factors  Decision regarding emergency major surgery  Decision regarding hospitalization  Decision not to resuscitate or to de-escalate care because of poor prognosis                 I have personally performed a face-to-face diagnostic evaluation and management  service on this patient. I have independently seen the patient. I have independently obtained the above history from the patient/family. I have independently examined the patient with above findings. I have independently reviewed data/labs for this patient and developed the above plan of care (MDM).   Electronically signed by Pj Campbell MD on 1/10/2022 at 9:14 AM

## 2022-01-10 NOTE — WOUND CARE
Negative Pressure    NAME:  Alondra Lucero  YOB: 1990  MEDICAL RECORD NUMBER:  [id number]  DATE:  1/10/2022     Applied Negative Pressure to abdominal wound(s)/ulcer(s).  [x] Applied skin barrier prep to hung-wound.  [x] Cut strips of plastic drape to picture frame wound so that hung-wound is     covered with the drape.  [x] If bridging dressing to less prominent site, cover any intact skin that will come in contact with the Negative Pressure Therapy sponge, gauze or channel drain with plastic drape. The sponge should never touch intact skin.  [x] Cut sponge, gauze or channel drain to size which will fit into the wound/ulcer bed without being forced.  [x] Be sure the sponge is large enough to hold the entire round plastic flange which is attached to the tubing. Never allow flange to be larger than the sponge or it will produce suction damaging intact skin.  Total number of individual pieces of foam used within the wound bed: 1             [x] If bridging the dressing away from the primary site, be sure the bridge leads to a piece of sponge large enough to hold the entire flange without allowing any of the flange to overlap onto intact skin.  [x] Covered sponge, gauze or channel drain with plastic drape.  [x] Cut a hole in this plastic drape directly over the sponge the same size as the plastic drain tubing.  [x] Removed plastic liner from flange and apply it directly over the hole you cut.  [x] Removed the plastic cover from the flange.  [x] Attached the tubing to the wound/ulcer Negative Pressure Therapy and turn it on to be sure a vacuum is created and that there are no leaks.  [x] If air leaks occur, use plastic drape to patch them.  [x] Secured Negative Pressure Therapy dressing with ace wrap loosely if located on an extremity. Maintain tubing outside of ace wrap. Tubing must not exert pressure on intact skin.     Response to treatment: Well tolerated by patient      Applied per  Guidelines      Electronically signed by Haylee Castaneda RN on 1/10/2022 at 10:09 AM

## 2022-01-10 NOTE — DISCHARGE INSTRUCTIONS
[address]  [address], [address]  Phone: [phone]  Fax: [phone]    Patient: Frandy Soto MRN: [id number]  SSN: [id number]    YOB: 1990  Age: 33 y.o. Sex: female       Return Appointment: 3 weeks with Negrita Reynoso MD    Instructions:   Abdominal Wound:  Cleanse wound and periwound with wound cleanser or normal saline. Negative Pressure Therapy ([employer] Wound Vac, Medela or SNAP)  Black granufoam, 125mmHg, continuous. Change 3 times per week. If pump malfunctions and requires change on non-scheduled day, patient to apply wet to dry dressing and contact Home Health. Home Health for dressing changes. Should you experience increased redness, swelling, pain, foul odor, size of wound(s), or have a temperature over 101 degrees please contact the [address] [place] [address] Road at [phone] or if after hours contact your primary care physician or go to the hospital emergency department.     Signed By: Jada Terrazas RN     January 10, 2022

## 2022-01-10 NOTE — WOUND CARE
01/10/22 0914   Wound Abdomen #1 01/10/22   Date First Assessed/Time First Assessed: 01/10/22 0913   Present on Hospital Admission: Yes  Wound Approximate Age at First Assessment (Weeks): 2 weeks  Primary Wound Type: Open incision/surgical site  Location: Abdomen  Wound Description: #1  Date of. .. Wound Image    Wound Etiology Non-Healing Surgical   Dressing Status Old drainage noted   Cleansed Cleansed with saline   Dressing/Treatment Negative Pressure Wound Therapy   Wound Length (cm) 2.8 cm   Wound Width (cm) 18.8 cm   Wound Depth (cm) 1.3 cm   Wound Surface Area (cm^2) 52.64 cm^2   Wound Volume (cm^3) 68.432 cm^3   Distance Tunneling (cm) 1.1 cm   Direction of Tunnel 9 o'clock   Wound Assessment Granulation tissue   Drainage Amount Large   Drainage Description Serosanguinous   Wound Odor None   Tracy-Wound/Incision Assessment Edematous   Wound Thickness Description Full thickness     Patient is not on ANTICOAGULANT THERAPY. Wound mechanically debrided with gauze and normal saline.

## 2022-01-10 NOTE — WOUND CARE
[address]  [address], [address]  Phone: [phone]  Fax: [phone]    Patient: Winston Munson MRN: [id number]  SSN: [id number]    YOB: 1990  Age: 33 y.o. Sex: female       Return Appointment: 3 weeks with Milagros Hassan MD    Instructions:   Abdominal Wound:  Cleanse wound and periwound with wound cleanser or normal saline. Negative Pressure Therapy ([employer]CI Wound Vac, Medela or SNAP)  Black granufoam, 125mmHg, continuous. Change 3 times per week. If pump malfunctions and requires change on non-scheduled day, patient to apply wet to dry dressing and contact Home Health. Home Health for dressing changes. Should you experience increased redness, swelling, pain, foul odor, size of wound(s), or have a temperature over 101 degrees please contact the [address] [place] [address] Road at [phone] or if after hours contact your primary care physician or go to the hospital emergency department.     Signed By: Flo Paula RN     January 10, 2022

## 2022-01-12 ENCOUNTER — HOME CARE VISIT (OUTPATIENT)
Dept: SCHEDULING | Facility: HOME HEALTH | Age: 32
End: 2022-01-12
Payer: COMMERCIAL

## 2022-01-12 VITALS
OXYGEN SATURATION: 98 % | RESPIRATION RATE: 18 BRPM | DIASTOLIC BLOOD PRESSURE: 70 MMHG | HEART RATE: 78 BPM | SYSTOLIC BLOOD PRESSURE: 134 MMHG | TEMPERATURE: 97.8 F

## 2022-01-12 PROCEDURE — G0299 HHS/HOSPICE OF RN EA 15 MIN: HCPCS

## 2022-01-12 NOTE — H&P
History & Physical     Name: Елена Cardona MRN: [id number]  SSN: [id number]    YOB: 1990  Age: 33 y.o. Sex: female       Subjective:      CC: Wound seroma     History of Present Illness: Ms. Ronald Ram is a 33 y.o.  female who presented last night with profuse serous fluid coming from her incision. She is POD #3 s/p rectus sheath hematoma incision and evacuation. Wound seroma was diagnosed at bedside. Incision had no e/o infection and pt had normal VS, afebrile. Wound incision was re-opened at bedside and wet-to-dry dressing was placed. Incision base had healthy granulation tissue, no e/o infection or necrosis. Fascia was intact and healthy.      Pt follows up today for dressing change.  Her  accompanies her to learn how to perform dressing changes at home.                        OB History    Para Term  AB Living   5 3 3   2 3   SAB IAB Ectopic Molar Multiple Live Births            0 2       # Outcome Date GA Lbr Shaka/2nd Weight Sex Delivery Anes PTL Lv   5 Term 21 37w1d   3.32 kg F CS-LTranv Spinal N PRAKASH   4 Term 19 39w2d   3.44 kg M CS-LTranv EPI N PRAKASH      Complications: Dysfunctional Labor, Fetal Intolerance, Failure to Progress in First Stage   3 Term 08 40w0d   4.961 kg F Vag-Spont None N     2 AB                     1 AB                             Past Medical History:   Diagnosis Date    39 weeks gestation of pregnancy 12/15/2019    Diet controlled gestational diabetes mellitus (GDM) in second trimester 2021    Disorder of pregnancy 2021    Encounter for planned induction of labor 12/15/2019    Gestational hypertension      History of elective  2016     x2    Hydronephrosis of fetus in kenney pregnancy, antepartum 10/7/2021    Hypertension      PCOS (polycystic ovarian syndrome)              Past Surgical History:   Procedure Laterality Date    HX OTHER SURGICAL   2014     EAB x2    HX TONSILLECTOMY   12/2010    HX WISDOM TEETH EXTRACTION          Social History            Occupational History    Not on file   Tobacco Use    Smoking status: Never Smoker    Smokeless tobacco: Never Used   Substance and Sexual Activity    Alcohol use: No    Drug use: No    Sexual activity: Yes       Partners: Male       Birth control/protection: None       Comment: trying to conceive            Family History   Problem Relation Age of Onset    Hypertension Maternal Grandmother           No Known Allergies          Prior to Admission medications    Medication Sig Start Date End Date Taking? Authorizing Provider   ferrous sulfate (IRON) 325 mg (65 mg iron) EC tablet Take 1 Tablet by mouth three (3) times daily (with meals) for 30 days. 1/4/22 2/3/22   Patricia Cote MD   simethicone (MYLICON) 80 mg chewable tablet Take 1 Tablet by mouth Before breakfast, lunch, dinner and at bedtime. 1/4/22     Patricia Cote MD   trimethoprim-sulfamethoxazole (BACTRIM DS, SEPTRA DS) 160-800 mg per tablet Take 1 Tablet by mouth two (2) times a day for 7 days. 1/4/22 1/11/22   Chet Medina MD   oxyCODONE IR (ROXICODONE) 5 mg immediate release tablet Take 1 Tablet by mouth every four (4) hours as needed for Pain for up to 3 days. Max Daily Amount: 30 mg. 1/4/22 1/7/22   Chet Medina MD   dexAMETHasone (DECADRON) 6 mg tablet Take 1 Tablet by mouth Daily (before breakfast) for 7 days. 1/5/22 1/12/22   Patricia Cote MD   calcium carbonate (TUMS) 200 mg calcium (500 mg) chew Take 1 Tablet by mouth daily.       Provider, Historical   omeprazole (PRILOSEC) 40 mg capsule Take 1 Capsule by mouth daily. Indications: heartburn  Patient not taking: Reported on 12/27/2021 11/3/21     Juanito Anne MD   docosahexaenoic acid (Prenatal DHA) 200 mg cap capsule Prenatal + DHA   1 po q d       Provider, Historical         Review of Systems:  A comprehensive review of systems was negative except for that written in the History of Present Illness.    Objective:      Vitals: There were no vitals filed for this visit. No data recorded.     No data recorded      Physical Exam:  Patient without distress. Heart: Regular rate and rhythm  Lung: clear to auscultation throughout lung fields, no wheezes, no rales, no rhonchi and normal respiratory effort  Abdomen: soft, nontender  Incision: dressing change performed. Healthy granulation tissue noted in subcutaneous space and would base. Fascia remained intact. Wet to dry dressing was placed. No purulence or necrosis noted.         Lab/Data Review:  Recent Results   No results found for this or any previous visit (from the past 24 hour(s)).        Assessment and Plan:      Active Problems:    * No active hospital problems. *     1. Postpartum state  2. Wound seroma  3. POD#3 s/p rectus sheath hematoma incision and evacuation     Plan:  [facility] is working on obtaining at-home wound vac for pt. Home health is scheduled to come to pt's house on Saturday 1/8/21 for wound assessment and dressing change. Pt has appt on Monday 1/10/21 at 0800 with wound care clinic. Pt's  was instructed on how to perform dressing changes.  Pt is aware she is to continue daily dressing changes until wound vac can be placed.        MD Ryland Mckeon

## 2022-01-13 PROCEDURE — MED10156 SCISSORS,BANDAGE,LISTER,5.5

## 2022-01-13 PROCEDURE — A6253 ABSORPT DRG > 48 SQ IN W/O B: HCPCS

## 2022-01-13 PROCEDURE — A4452 WATERPROOF TAPE: HCPCS

## 2022-01-13 PROCEDURE — A5120 SKIN BARRIER, WIPE OR SWAB: HCPCS

## 2022-01-13 PROCEDURE — A6216 NON-STERILE GAUZE<=16 SQ IN: HCPCS

## 2022-01-13 PROCEDURE — MED10158 APPLICATOR, COTTON-TIP, WOOD, 6, STRL

## 2022-01-13 PROCEDURE — A9270 NON-COVERED ITEM OR SERVICE: HCPCS

## 2022-01-13 PROCEDURE — A4216 STERILE WATER/SALINE, 10 ML: HCPCS

## 2022-01-14 ENCOUNTER — HOME CARE VISIT (OUTPATIENT)
Dept: SCHEDULING | Facility: HOME HEALTH | Age: 32
End: 2022-01-14
Payer: COMMERCIAL

## 2022-01-14 PROCEDURE — G0299 HHS/HOSPICE OF RN EA 15 MIN: HCPCS

## 2022-01-15 VITALS
DIASTOLIC BLOOD PRESSURE: 70 MMHG | HEART RATE: 76 BPM | OXYGEN SATURATION: 98 % | TEMPERATURE: 98.2 F | RESPIRATION RATE: 18 BRPM | SYSTOLIC BLOOD PRESSURE: 124 MMHG

## 2022-01-17 ENCOUNTER — HOME CARE VISIT (OUTPATIENT)
Dept: SCHEDULING | Facility: HOME HEALTH | Age: 32
End: 2022-01-17
Payer: COMMERCIAL

## 2022-01-17 VITALS
RESPIRATION RATE: 18 BRPM | HEART RATE: 78 BPM | TEMPERATURE: 97.8 F | SYSTOLIC BLOOD PRESSURE: 132 MMHG | DIASTOLIC BLOOD PRESSURE: 70 MMHG | OXYGEN SATURATION: 99 %

## 2022-01-17 PROCEDURE — G0299 HHS/HOSPICE OF RN EA 15 MIN: HCPCS

## 2022-01-19 ENCOUNTER — HOME CARE VISIT (OUTPATIENT)
Dept: SCHEDULING | Facility: HOME HEALTH | Age: 32
End: 2022-01-19
Payer: COMMERCIAL

## 2022-01-19 VITALS
RESPIRATION RATE: 20 BRPM | OXYGEN SATURATION: 98 % | DIASTOLIC BLOOD PRESSURE: 72 MMHG | SYSTOLIC BLOOD PRESSURE: 136 MMHG | TEMPERATURE: 97.8 F | HEART RATE: 93 BPM

## 2022-01-19 PROCEDURE — G0299 HHS/HOSPICE OF RN EA 15 MIN: HCPCS

## 2022-01-21 ENCOUNTER — HOME CARE VISIT (OUTPATIENT)
Dept: SCHEDULING | Facility: HOME HEALTH | Age: 32
End: 2022-01-21
Payer: COMMERCIAL

## 2022-01-21 PROCEDURE — G0299 HHS/HOSPICE OF RN EA 15 MIN: HCPCS

## 2022-01-23 VITALS
DIASTOLIC BLOOD PRESSURE: 78 MMHG | HEART RATE: 74 BPM | SYSTOLIC BLOOD PRESSURE: 126 MMHG | TEMPERATURE: 98.7 F | OXYGEN SATURATION: 98 % | RESPIRATION RATE: 18 BRPM

## 2022-01-24 ENCOUNTER — HOME CARE VISIT (OUTPATIENT)
Dept: SCHEDULING | Facility: HOME HEALTH | Age: 32
End: 2022-01-24
Payer: COMMERCIAL

## 2022-01-24 VITALS
HEART RATE: 74 BPM | DIASTOLIC BLOOD PRESSURE: 74 MMHG | RESPIRATION RATE: 16 BRPM | SYSTOLIC BLOOD PRESSURE: 126 MMHG | TEMPERATURE: 97.4 F | OXYGEN SATURATION: 98 %

## 2022-01-24 PROCEDURE — G0299 HHS/HOSPICE OF RN EA 15 MIN: HCPCS

## 2022-01-26 ENCOUNTER — HOME CARE VISIT (OUTPATIENT)
Dept: SCHEDULING | Facility: HOME HEALTH | Age: 32
End: 2022-01-26
Payer: COMMERCIAL

## 2022-01-26 PROCEDURE — G0299 HHS/HOSPICE OF RN EA 15 MIN: HCPCS

## 2022-01-27 VITALS
TEMPERATURE: 97.8 F | OXYGEN SATURATION: 98 % | DIASTOLIC BLOOD PRESSURE: 78 MMHG | RESPIRATION RATE: 18 BRPM | SYSTOLIC BLOOD PRESSURE: 136 MMHG | HEART RATE: 80 BPM

## 2022-01-28 ENCOUNTER — HOME CARE VISIT (OUTPATIENT)
Dept: SCHEDULING | Facility: HOME HEALTH | Age: 32
End: 2022-01-28
Payer: COMMERCIAL

## 2022-01-28 ENCOUNTER — TELEPHONE (OUTPATIENT)
Dept: WOUND CARE | Age: 32
End: 2022-01-28

## 2022-01-28 PROCEDURE — G0299 HHS/HOSPICE OF RN EA 15 MIN: HCPCS

## 2022-01-28 NOTE — TELEPHONE ENCOUNTER
Windy Buck RN with [facility] reports she is unable to  Put wound vac on because wound is closed- states she ut wet o dry and told her to change tomorrow and Sunday and then see Dr. Nowak Mon Monday.

## 2022-01-29 VITALS
RESPIRATION RATE: 18 BRPM | SYSTOLIC BLOOD PRESSURE: 138 MMHG | OXYGEN SATURATION: 98 % | TEMPERATURE: 97.8 F | HEART RATE: 78 BPM | DIASTOLIC BLOOD PRESSURE: 80 MMHG

## 2022-01-31 ENCOUNTER — HOME CARE VISIT (OUTPATIENT)
Dept: SCHEDULING | Facility: HOME HEALTH | Age: 32
End: 2022-01-31
Payer: COMMERCIAL

## 2022-01-31 ENCOUNTER — HOSPITAL ENCOUNTER (OUTPATIENT)
Dept: WOUND CARE | Age: 32
Discharge: HOME | End: 2022-01-31
Attending: SURGERY
Payer: COMMERCIAL

## 2022-01-31 VITALS
SYSTOLIC BLOOD PRESSURE: 155 MMHG | TEMPERATURE: 98.6 F | HEART RATE: 74 BPM | HEIGHT: 69 IN | RESPIRATION RATE: 18 BRPM | WEIGHT: 280 LBS | BODY MASS INDEX: 41.47 KG/M2 | DIASTOLIC BLOOD PRESSURE: 109 MMHG | OXYGEN SATURATION: 98 %

## 2022-01-31 DIAGNOSIS — T81.49XA POSTOPERATIVE WOUND CELLULITIS: ICD-10-CM

## 2022-01-31 DIAGNOSIS — O99.213 OBESITY AFFECTING PREGNANCY IN THIRD TRIMESTER: ICD-10-CM

## 2022-01-31 DIAGNOSIS — T81.89XA NON-HEALING SURGICAL WOUND, INITIAL ENCOUNTER: ICD-10-CM

## 2022-01-31 PROCEDURE — 99214 OFFICE O/P EST MOD 30 MIN: CPT | Performed by: SURGERY

## 2022-01-31 PROCEDURE — 99213 OFFICE O/P EST LOW 20 MIN: CPT

## 2022-01-31 NOTE — WOUND CARE
[address]  [address], [address]  Phone: [phone]  Fax: [phone]    Patient: Olman Nguyen MRN: [id number]  SSN: [id number]    YOB: 1990  Age: 33 y.o. Sex: female       Return Appointment: 2 weeks with Salvatore Blount MD    Instructions: Abdominal Wound:  Cleanse wound and periwound with wound cleanser or normal saline. Iodosorb- apply thin layer to wound bed   Cover with dry gauze  Secure with Tegaderm or VAC drape. Change dressing 3 times per week. Discontinue home health at this time. Discontinue wound VAC at this time. Return VAC per equipment instructions. Should you experience increased redness, swelling, pain, foul odor, size of wound(s), or have a temperature over 101 degrees please contact the [address] [place] [address] Road at [phone] or if after hours contact your primary care physician or go to the hospital emergency department.     Signed By: Luis Enrique Miranda PT, [facility]     January 31, 2022

## 2022-01-31 NOTE — DISCHARGE INSTRUCTIONS
[address]  [address], [address] [facility] [address]   Phone: [phone]  Fax: [phone]    Patient: Mikhail Michael MRN: [id number]  SSN: [id number]    YOB: 1990  Age: 33 y.o. Sex: female       Return Appointment: 2 weeks with Faheem Hillman MD    Instructions: Abdominal Wound:  Cleanse wound and periwound with wound cleanser or normal saline. Iodosorb- apply thin layer to wound bed   Cover with dry gauze  Secure with Tegaderm or VAC drape. Change dressing 3 times per week. Discontinue home health at this time. Should you experience increased redness, swelling, pain, foul odor, size of wound(s), or have a temperature over 101 degrees please contact the [address] [place] [address] Road at [phone] or if after hours contact your primary care physician or go to the hospital emergency department.     Signed By: Johana Velásquez PT, [facility]    January 31, 2022

## 2022-01-31 NOTE — PROGRESS NOTES
01/31/22 1120   Wound Abdomen #1 c section opened on lower abdomen   Date First Assessed/Time First Assessed: 01/07/22 0913   Present on Hospital Admission: Yes  Wound Approximate Age at First Assessment (Weeks): 2 weeks  Primary Wound Type: Open incision/surgical site  Location: Abdomen  Wound Description: #1 c sectio. ..    Wound Image    Wound Etiology Non-Healing Surgical   Dressing Status Old drainage noted   Cleansed Cleansed with saline   Dressing/Treatment Gauze dressing/dressing sponge   Wound Length (cm) 0.2 cm   Wound Width (cm) 14 cm   Wound Depth (cm) 0.1 cm   Wound Surface Area (cm^2) 2.8 cm^2   Change in Wound Size % 94.24   Wound Volume (cm^3) 0.28 cm^3   Wound Healing % 100   Wound Assessment Epithelialization;Pink/red   Drainage Amount Small   Drainage Description Serosanguinous   Wound Odor None   Tracy-Wound/Incision Assessment Intact   Wound Thickness Description Full thickness

## 2022-01-31 NOTE — PROGRESS NOTES
[address]  Consult Note/H&P/Progress Note      Leigh Carey  MEDICAL RECORD NUMBER:  [id number]  AGE: 33 y.o. RACE BLACK/  GENDER: female  : 1990  EPISODE DATE:  2022       Subjective:      CC (Chief Complaint) and HISTORY of PRESENT ILLNESS (HPI):    Leigh Carey is a 33 y.o. female who presents today for wound/ulcer evaluation. She is severely obese with BMI of 44.23 kg/m². She weighs 299 pounds. She underwent repeat  by Dr. Judith Coy on 2021. It was a difficult repeat incision and was complicated by hematoma requiring evacuation by Dr. Angel Cannon on 2022. Follow-up identified a large seroma and wound was opened for wet-to-dry dressings. That follow-up visit was on 2022. Wound care consult was placed for wound VAC which has been placed by home health. She comes for her first visit to the wound center on 1/10/2022. Wound VAC is being changed 3 times per week with gray foam.  Is on 2022. Wound VAC was discontinued by home health and she has only 3 small open areas of the wound that are being treated with wet-to-dry dressings.       This information was obtained from the patient  The following HPI elements were documented for the patient's wound:  Location: Lower abdomen Pfannenstiel incision  Duration: 2022  Severity:  +++  Context: Disrupted  wound with hematoma following  2021  Modifying Factors:  Nothing makes better or worse  Quality:    Timing:     Associated Signs and Symptoms: Severe obesity, wound hematoma      Ulcer Identification:  Ulcer Type: non-healing surgical    Contributing Factors: diabetes and obesity        PAST MEDICAL HISTORY  Past Medical History:   Diagnosis Date    39 weeks gestation of pregnancy 12/15/2019    Delivery with history of  2021    Diet controlled gestational diabetes mellitus (GDM) in second trimester 2021  Disorder of pregnancy 2021    Encounter for planned induction of labor 12/15/2019    Gestational hypertension     History of elective  8/1/2016    x2    Hydronephrosis of fetus in kenney pregnancy, antepartum 10/7/2021    Hypertension     PCOS (polycystic ovarian syndrome)         PAST SURGICAL HISTORY  Past Surgical History:   Procedure Laterality Date    HX OTHER SURGICAL  2014    EAB x2    HX TONSILLECTOMY  2010    HX WISDOM TEETH EXTRACTION         FAMILY HISTORY  Family History   Problem Relation Age of Onset    Hypertension Maternal Grandmother        SOCIAL HISTORY  Social History     Tobacco Use    Smoking status: Never Smoker    Smokeless tobacco: Never Used   Substance Use Topics    Alcohol use: No    Drug use: No       ALLERGIES  No Known Allergies    MEDICATIONS  Current Outpatient Medications on File Prior to Encounter   Medication Sig Dispense Refill    acetaminophen (TYLENOL) 500 mg tablet Take 500 mg by mouth every six (6) hours as needed for Pain.  oxyCODONE IR (ROXICODONE) 5 mg immediate release tablet Take 5 mg by mouth every four (4) hours as needed for Pain.  furosemide (Lasix) 20 mg tablet Take 20 mg by mouth daily. 2 tabs DAILY x 5 day; on DAY 2/5      ferrous sulfate (IRON) 325 mg (65 mg iron) EC tablet Take 1 Tablet by mouth three (3) times daily (with meals) for 30 days. 90 Tablet 0    calcium carbonate (TUMS) 200 mg calcium (500 mg) chew Take 1 Tablet by mouth daily as needed for PRN Reason (Other) (indegestion).  omeprazole (PRILOSEC) 40 mg capsule Take 1 Capsule by mouth daily. Indications: heartburn (Patient not taking: Reported on 2021) 30 Capsule 3    docosahexaenoic acid (Prenatal DHA) 200 mg cap capsule 1 cap by mouth daily        No current facility-administered medications on file prior to encounter. REVIEW OF SYSTEMS  The patient has no difficulty with chest pain or shortness of breath. No fever or chills. Comprehensive review of systems was otherwise unremarkable except as noted above. Objective:   Physical Exam:   Recent vitals (if inpt):  Patient Vitals for the past 24 hrs:   BP Temp Pulse Resp SpO2 Height Weight   01/31/22 1116 (!) 155/109 98.6 °F (37 °C) 74 18 98 %     01/31/22 1113      5' 9\" (1.753 m) 280 lb (127 kg)       Visit Vitals  BP (!) 155/109 (BP 1 Location: Right upper arm, BP Patient Position: At rest)   Pulse 74   Temp 98.6 °F (37 °C)   Resp 18   Ht 5' 9\" (1.753 m)   Wt 280 lb (127 kg)   LMP 01/20/2021   SpO2 98%   BMI 41.35 kg/m²     Wt Readings from Last 3 Encounters:   01/31/22 280 lb (127 kg)   01/10/22 299 lb 8 oz (135.9 kg)   01/02/22 314 lb (142.4 kg)     Constitutional: Alert, oriented, cooperative patient in no acute distress; appears stated age;  General appearance is within normal limits for wound care patient population. See the today's recorded vitals signs and constitutional data. Eyes: Pupils equal; Sclera are clear. EOMs intact; The eyes appear to track and move normally. The sclera are not injected. The conjunctive are clear. The eyelids are normal. There is no scleral icterus. ENMT: There are no obvious external ear, nose, lip or mouth lesions. Nares normal; Neck: Overall contour of the neck is normal with no obvious neck masses. Gross hearing is within normal limits. No obvious neck masses  Resp:  Breathing is non-labored; normal rate and effort; no audible wheezing. CV: RRR;  no JVD; No evidence of cyanosis of the upper extremities. The extremities are perfused without embolic sign, splinter hemorrhages, or petechia. GI: Obese, soft and non-distended without acute abnormality noted. Musculoskeletal: unremarkable with normal function. No embolic signs or cyanosis. Neuro:  Oriented; moves all 4; no focal deficits  Psychiatric: Judgement and insight are within normal limits for the wound care population of patients.  Patient is oriented to person, place, and time. Recent and remote memory are within normal limits. Mood and affect are within normal limits. Integumentary (Skin/Wounds)  See inspection of wound(s) below. There are no other skin areas of palpable concern. See wound center documentation including photos in the [facility] [address] [place] [address] Wound Template made part of this record by reference. Wounds:  Wound Abdomen #1 c section opened on lower abdomen (Active)   Wound Image   01/31/22 1120   Wound Etiology Non-Healing Surgical 01/31/22 1120   Dressing Status Old drainage noted 01/31/22 1120   Cleansed Cleansed with saline 01/31/22 1120   Dressing/Treatment Gauze dressing/dressing sponge 01/31/22 1120   Wound Length (cm) 0.2 cm 01/31/22 1120   Wound Width (cm) 14 cm 01/31/22 1120   Wound Depth (cm) 0.1 cm 01/31/22 1120   Wound Surface Area (cm^2) 2.8 cm^2 01/31/22 1120   Change in Wound Size % 94.24 01/31/22 1120   Wound Volume (cm^3) 0.28 cm^3 01/31/22 1120   Wound Healing % 100 01/31/22 1120   Distance Tunneling (cm) 1.1 cm 01/10/22 0914   Direction of Tunnel 9 o'clock 01/10/22 0914   Wound Assessment Epithelialization;Pink/red 01/31/22 1120   Drainage Amount Small 01/31/22 1120   Drainage Description Serosanguinous 01/31/22 1120   Wound Odor None 01/31/22 1120   Tracy-Wound/Incision Assessment Intact 01/31/22 1120   Wound Thickness Description Full thickness 01/31/22 1120   Number of days: 24       [REMOVED] Wound Abdomen (Removed)   Number of days: 755          Amount and/or Complexity of Data Reviewed and Analyzed:  I reviewed and analyzed all of the unique labs and radiologic studies that are shown below as well as any that are in the HPI, and any that are in the expanded problem list below  *Each unique test, order, or document contributes to the combination of 2 or combination of 3 in Category 1 below. I also independently reviewed radiology images for studies I described above in the HPI or studies I have ordered.    For this visit I also reviewed old records and prior notes. No results for input(s): WBC, HGB, PLT, NA, K, CL, CO2, BUN, CREA, GLU, PTP, INR, APTT, TBIL, TBILI, CBIL, ALT, AP, AML, AML, LPSE, LCAD, NH4, TROPT, TROIQ, PCO2, PO2, HCO3, HGBEXT, PLTEXT, INREXT, HGBEXT, PLTEXT, INREXT in the last 72 hours. No lab exists for component: SGOT, GPT,  PH  No results for input(s): PTP, INR, APTT, TBIL, TBILI, CBIL, ALT, AP, AML, LPSE, INREXT, INREXT in the last 72 hours. No lab exists for component: SGOT, GPT    Most recent blood counts (CBC) review  Lab Results   Component Value Date/Time    WBC 8.1 01/04/2022 08:40 AM    Hemoglobin (POC) 11.8 10/05/2017 08:48 AM    HGB 8.0 (L) 01/04/2022 08:40 AM    HCT 26.0 (L) 01/04/2022 08:40 AM    PLATELET 703 17/49/5264 08:40 AM    MCV 97.7 01/04/2022 08:40 AM     Most recent chemistry (BMP) test review   Lab Results   Component Value Date/Time    Sodium 141 01/04/2022 08:40 AM    Potassium 3.7 01/04/2022 08:40 AM    Chloride 111 (H) 01/04/2022 08:40 AM    CO2 23 01/04/2022 08:40 AM    Anion gap 7 01/04/2022 08:40 AM    Glucose 72 01/04/2022 08:40 AM    BUN 9 01/04/2022 08:40 AM    Creatinine 0.82 01/04/2022 08:40 AM    GFR est AA >60 01/04/2022 08:40 AM    GFR est non-AA >60 01/04/2022 08:40 AM    Calcium 8.3 01/04/2022 08:40 AM     Most recent Liver enzyme test review  Lab Results   Component Value Date/Time    ALT (SGPT) 21 01/03/2022 09:16 AM    AST (SGOT) 14 (L) 01/03/2022 09:16 AM    Alk.  phosphatase 75 01/03/2022 09:16 AM    Bilirubin, total 0.4 01/03/2022 09:16 AM     Most recent coagulation (coags) review  Lab Results   Component Value Date/Time    INR 0.9 01/01/2022 10:15 PM    Prothrombin time 12.6 01/01/2022 10:15 PM     Lab Results   Component Value Date/Time    INR 0.9 01/01/2022 10:15 PM    aPTT 27.3 01/01/2022 10:15 PM       Diabetic assessment  No results found for: HBA1C, IBP3MKOG, KCX5LQVF, FAT2KHTO    Nutritional assessment screen to assess wound healing ability:  Lab Results Component Value Date/Time    Protein, total 6.5 01/03/2022 09:16 AM    Albumin 2.4 (L) 01/03/2022 09:16 AM     Lab Results   Component Value Date/Time    Protein, total 6.5 01/03/2022 09:16 AM    Albumin 2.4 (L) 01/03/2022 09:16 AM       XR Results (most recent):  Results from Hospital Encounter encounter on 01/01/22    XR CHEST SNGL V    Narrative  Exam: XR CHEST SNGL V on 1/3/2022 1:14 PM    Clinical History: The Female patient is 33years old  presenting for COVID/ SOB. Comparison:  none    Findings:  Frontal view of the chest was obtained. The lung fields are clear. No pleural effusions are demonstrated. The  cardiomediastinal silhouette is within normal limits. There are no acute  osseous abnormalities. Impression  1. No acute cardiopulmonary process. CPT code(s) 24289      CT Results (most recent):  Results from Hospital Encounter encounter on 01/01/22    CT ABD PELV WO CONT    Narrative  CT ABD PELV WO CONT 1/1/2022 11:41 PM    HISTORY: Hematoma      TECHNIQUE: CT scan of the abdomen and pelvis was performed without IV contrast.  Multiplanar reformats were obtained. Dose reduction techniques were used. CONTRAST: None. FINDINGS:  LOWER CHEST: Normal.    HEPATOBILIARY: Normal.    PANCREAS: Normal.    SPLEEN: Normal.    ADRENAL GLANDS: Normal.    KIDNEYS/BLADDER: No hydronephrosis or renal calculi. Bladder is decompressed but  otherwise unremarkable. BOWEL: No bowel obstruction or diverticulitis. Appendix not visualized. LYMPH NODES: No enlarged abdominal or pelvic lymph nodes. VASCULATURE: Unremarkable. PELVIC ORGANS: Gravid uterus is noted. Anterior abdominal wall hematoma present  on image 74 of series 2 measuring 17.5 cm transverse dimension, 10.6 cm AP  dimension and 6.3 cm craniocaudal dimension. No free intrapelvic fluid. MUSCULOSKELETAL: Normal.    Impression  1. Large lower anterior abdominal wall hematoma.     2.  No acute intra-abdominal or pelvic abnormality. Admission date (for inpatients): 2022   * No surgery found *  * No surgery found *    Assessment:      Problem List Items Addressed This Visit     None          Problem List  Date Reviewed: 2022          Codes Class Noted    Nonhealing surgical wound ICD-10-CM: T81.89XA  ICD-9-CM: 998.83  1/10/2022        COVID ICD-10-CM: U07.1  ICD-9-CM: 079.89  2022        Acute respiratory failure with hypoxia ([address] ICD-10-CM: J96.01  ICD-9-CM: 518.81  1/3/2022        COVID-19 virus infection ICD-10-CM: U07.1  ICD-9-CM: 079.89  1/3/2022        Wound hematoma following  section, postpartum ICD-10-CM: O90.2  ICD-9-CM: 674.34  2022        Postpartum  wound disruption ICD-10-CM: O90.0  ICD-9-CM: 674.14  2022        Postoperative wound cellulitis ICD-10-CM: T81.49XA  ICD-9-CM: 998.59  2022        37 weeks gestation of pregnancy ICD-10-CM: Z3A.37  ICD-9-CM: V22.2  2021        Noncompliant pregnant patient in third trimester ICD-10-CM: O09.893, Z91.19  ICD-9-CM: V23.89, V15.81  2021    Overview Addendum 2021  8:38 AM by Fred Shields     2021 at [facility]: Pt has to be prompted to send blood sugar logs. Most recently, pt stated that she sends them 'before her appointments'. It has been stated to her verbally and in print that logs are to be sent weekly for review in order to adjust her insulin. Last log received was 11/3/2021. At today's appointment, pt only had 6 days worth. Stressed again the importance of compliance to patient. Hydronephrosis of fetus in kenney pregnancy, antepartum ICD-10-CM: O35. 8XX0  ICD-9-CM: 655.83  10/7/2021    Overview Addendum 2021  9:04 AM by Fred Shields     10/7/2021 at [facility]: Mild, bilateral seen today; Rt 4.7 mm, Lt 5.9 mm  2021 at [facility]: Resolved                 Chronic hypertension affecting pregnancy ICD-10-CM: O10.919  ICD-9-CM: 642.00  2021    Overview Addendum 2021 10:58 AM by Megan Morataya MD     8/25/2021 at [facility]: 162/62, 150/82  10/7/2021 at [facility]: Pt reports that she had stopped her Procardia per her OB d/t HAs and that her readings there were consistently WNL, was only on it for apprx. 2 weeks, BP today WNL. 11/3/2021 at [facility]: BP elevated today - pt reports dizziness/blurry vision when she 'feels her BP is up'. No longer taking procardia due to HA. Start labetalol 200 mg BID.  12/2/2021 at [facility]: BP elevated today but not severe, no HAs. Increase Labetalol to bid then tid as needed. .    · Continue twice weekly testing. · Delivery 37-39 wk due to [facility] on meds. Sooner if severe disease. · Get preeclamptic labs in your office for any elevated BPs or symptoms:  (CBC, CMP, LDH, Uric Acid); also do P/C ratio (if elevated, need to then confirm with 24h urine) or 24 hour urine (for total protein and creatinine clearance). High-risk pregnancy in third trimester ICD-10-CM: O09.93  ICD-9-CM: V23.9  8/24/2021    Overview Addendum 12/2/2021  9:01 AM by Shania Byrne     LOW RISK NIPT; AFP negative    10/7/2021 at [facility]: Normal anatomy, limited echo d/t MBH. AC 58%, Overall 56%, DELFIN appears generous. Mild bilateral hydronephrosis seen today; Rt kidney 4.7 mm, Lt kidney 5.9 mm.  11/3/2021 at [facility]: Appropriate fetal growth AC 76%, Overall 75%, DELFIN 24 cm (generous),  BPP 8/8.  12/2/2021 at [facility]: Accelerated fetal growth AC 82%, Overall 72%, DELFIN 22cm, UA Dopplers WNL, BPP 8/8. · See GDM Overview for log review and recommendations.   · Scheduled repeat C/S on 12/27/2021  · Continue twice weekly testing with primary OB             Obesity affecting pregnancy in third trimester ICD-10-CM: O99.213  ICD-9-CM: 649.13  8/24/2021        Severe obesity (BMI >= 40) (HCC) ICD-10-CM: E66.01  ICD-9-CM: 278.01  8/24/2021        Insulin controlled gestational diabetes mellitus (GDM) in third trimester ICD-10-CM: O24.414  ICD-9-CM: 648.83  8/24/2021    Overview Addendum 12/2/2021 11:07 AM by Matheus Avalos MD     ....... 2021: Most readings elevated, adjust dose to LA Insulin 18/18.  10/7/2021 at [facility]: Glucose log reviewed. Ranges from 92 to 144. Most readings mildly elevated, improved. Increase dose to LA Insulin .   11/3/2021 at [facility]: Glucose log reviewed. Ranges from 95 to 138. Most all readings elevated, adjust dose. Levemir /.   2021 at [facility]: Pt brought 6 days worth of readings. Ranges from [address]. Levemir . · Adjust insulin PRN remotely when sends in BS  · Continue wice weekly testing in OB office. · Repeat C/S at 39 weeks. · No follow up at [facility] CT made. H/O  section complicating pregnancy OVN-04-ZD: O34.219  ICD-9-CM: 654.20  2021        Polycystic ovary affecting pregnancy, antepartum ICD-10-CM: O34.80, E28.2  ICD-9-CM: 646.83, 256.4  2020              Active Problems:    * No active hospital problems. *          Plan:     Number and Complexity of Problems addressed and   Risks of complications and/or morbidity of management    Treatment Note please see attached Discharge Instructions  Any procedures done today in the wound center are documented in a separate note in connect care made part of this record by reference  Written patient dismissal instructions given to patient or POA. Nonhealing surgical wound  Postpartum  wound disruption  Wound hematoma following  section, postpartum  Severe obesity (BMI >= 40) ([facility] [address]    She presents to the wound center on 1/10/2022. She had  on 2021 and hematoma evacuation on 2022. Wound VAC was recently placed and she is having home health change her back to 3 times per week. She is near completion on a 7-day course of Bactrim. There is no evidence of infection. She does not need to continue her antibiotics beyond this course. I do not find any culture results. Her wound is nicely granulating and pink.   Suspect this will close quickly with the wound VAC.  She will continue with home health VAC changes 3 times per week. She returns on 1/31/2022. Wounds are near closed. Wound VAC has been discontinued. She was receiving wet-to-dry dressings and we will convert that to Iodosorb. I will see her back in 2 weeks. Wound Care  Orders Placed This Encounter    WOUND CARE, DRESSING CHANGE     Abdominal Wound:  Cleanse wound and periwound with wound cleanser or normal saline. Iodosorb- apply thin layer to wound bed   Cover with dry gauze  Secure with Tegaderm or VAC drape. Change dressing 3 times per week. Discontinue home health at this time. Standing Status:   Standing     Number of Occurrences:   1       Follow-up Information     Follow up With Specialties Details Why Contact Info    [address] In 2 weeks For wound re-check [address] [facility] [id number] [phone]                  Level of MDM (2/3 elements below)  Number and Complexity of Problems Addressed Amount and/or Complexity of Data to be Reviewed and Analyzed  *Each unique test, order, or document contributes to the combination of 2 or combination of 3 in Category 1 below.  Risk of Complications and/or Morbidity or Mortality of pt Management     82529  96826 SF Minimal  1self-limited or minor problem Minimal or none Minimal risk of morbidity from additional diagnostic testing or Rx   87014  75170 Low Low  2or more self-limited or minor problems;    or  1stable chronic illness;    or  9DBIQU, uncomplicated illness or injury   Limited  (Must meet the requirements of at least 1 of the 2 categories)  Category 1: Tests and documents   Any combination of 2 from the following:  Review of prior external note(s) from each unique source*;  review of the result(s) of each unique test*;   ordering of each unique test*    or   Category 2: Assessment requiring an independent historian(s)  (For the categories of independent interpretation of tests and discussion of management or test interpretation, see moderate or high) Low risk of morbidity from additional diagnostic testing or treatment     29309  81537 Mod Moderate  1or more chronic illnesses with exacerbation, progression, or side effects of treatment;    or  2or more stable chronic illnesses;    or  1undiagnosed new problem with uncertain prognosis;    or  1acute illness with systemic symptoms;    or  6MIJPC complicated injury   Moderate  (Must meet the requirements of at least 1 out of 3 categories)  Category 1: Tests, documents, or independent historian(s)  Any combination of 3 from the following:   Review of prior external note(s) from each unique source*;  Review of the result(s) of each unique test*;  Ordering of each unique test*;  Assessment requiring an independent historian(s)    or  Category 2: Independent interpretation of tests   Independent interpretation of a test performed by another physician/other qualified health care professional (not separately reported);     or  Category 3: Discussion of management or test interpretation  Discussion of management or test interpretation with external physician/other qualified health care professional/appropriate source (not separately reported)   Moderate risk of morbidity from additional diagnostic testing or treatment  Examples only:  Prescription drug management   Decision regarding minor surgery with identified patient or procedure risk factors  Decision regarding elective major surgery without identified patient or procedure risk factors   Diagnosis or treatment significantly limited by social determinants of health       27896  15938 High High  1or more chronic illnesses with severe exacerbation, progression, or side effects of treatment;    or  1 acute or chronic illness or injury that poses a threat to life or bodily function   Extensive  (Must meet the requirements of at least 2 out of 3 categories)  Category 1: Tests, documents, or independent historian(s)  Any combination of 3 from the following:   Review of prior external note(s) from each unique source*;  Review of the result(s) of each unique test*;   Ordering of each unique test*;   Assessment requiring an independent historian(s)    or   Category 2: Independent interpretation of tests   Independent interpretation of a test performed by another physician/other qualified health care professional (not separately reported);     or  Category 3: Discussion of management or test interpretation  Discussion of management or test interpretation with external physician/other qualified health care professional/appropriate source (not separately reported)   High risk of morbidity from additional diagnostic testing or treatment  Examples only:  Drug therapy requiring intensive monitoring for toxicity  Decision regarding elective major surgery with identified patient or procedure risk factors  Decision regarding emergency major surgery  Decision regarding hospitalization  Decision not to resuscitate or to de-escalate care because of poor prognosis                 I have personally performed a face-to-face diagnostic evaluation and management  service on this patient. I have independently seen the patient. I have independently obtained the above history from the patient/family. I have independently examined the patient with above findings. I have independently reviewed data/labs for this patient and developed the above plan of care (MDM).   Electronically signed by Jimena Cid MD on 1/31/2022 at 9:14 AM

## 2022-02-02 ENCOUNTER — HOME CARE VISIT (OUTPATIENT)
Dept: SCHEDULING | Facility: HOME HEALTH | Age: 32
End: 2022-02-02
Payer: COMMERCIAL

## 2022-02-02 PROCEDURE — G0299 HHS/HOSPICE OF RN EA 15 MIN: HCPCS

## 2022-02-03 VITALS
TEMPERATURE: 98.2 F | HEART RATE: 78 BPM | SYSTOLIC BLOOD PRESSURE: 134 MMHG | RESPIRATION RATE: 18 BRPM | DIASTOLIC BLOOD PRESSURE: 78 MMHG | OXYGEN SATURATION: 100 %

## 2022-02-14 ENCOUNTER — HOSPITAL ENCOUNTER (OUTPATIENT)
Dept: WOUND CARE | Age: 32
Discharge: HOME | End: 2022-02-14
Attending: SURGERY

## 2022-02-14 VITALS
HEART RATE: 74 BPM | SYSTOLIC BLOOD PRESSURE: 166 MMHG | RESPIRATION RATE: 18 BRPM | BODY MASS INDEX: 40.58 KG/M2 | DIASTOLIC BLOOD PRESSURE: 98 MMHG | OXYGEN SATURATION: 98 % | WEIGHT: 274 LBS | HEIGHT: 69 IN | TEMPERATURE: 98.4 F

## 2022-02-14 NOTE — WOUND CARE
[address]  [address], [address]   Phone: [phone]  Fax: [phone]    Patient: Mehreen Carver MRN: [id number]  SSN: [id number]    YOB: 1990  Age: 33 y.o. Sex: female       Return Appointment: 2 weeks with Jony Guaman MD    Instructions: Abdominal Wound:  Patient may shower daily. Cleanse wound and periwound with wound cleanser or normal saline immediately after shower. Tuck thin piece of dry gauze into abdominal fold. Keep dry and change dressing daily. Chemical cautery performed today via silver nitrate by Dr. Marquetta Burkitt. Should you experience increased redness, swelling, pain, foul odor, size of wound(s), or have a temperature over 101 degrees please contact the [address] [place] [address] Road at [phone] or if after hours contact your primary care physician or go to the hospital emergency department.     Signed By: Beti Au, PT, [address] [facility]     February 14, 2022

## 2022-02-14 NOTE — PROGRESS NOTES
02/14/22 1135   Wound Abdomen #1 c section opened on lower abdomen   Date First Assessed/Time First Assessed: 01/07/22 0913   Present on Hospital Admission: Yes  Wound Approximate Age at First Assessment (Weeks): 2 weeks  Primary Wound Type: Open incision/surgical site  Location: Abdomen  Wound Description: #1 c sectio. .. Wound Image    Wound Etiology Non-Healing Surgical   Dressing Status Clean;Dry; Intact   Cleansed Cleansed with saline   Dressing/Treatment Gauze dressing/dressing sponge   Wound Length (cm) 0.1 cm   Wound Width (cm) 9 cm   Wound Depth (cm) 0.1 cm   Wound Surface Area (cm^2) 0.9 cm^2   Change in Wound Size % 98.15   Wound Volume (cm^3) 0.09 cm^3   Wound Healing % 100   Wound Assessment Epithelialization;Pink/red   Drainage Amount Scant   Drainage Description Sanguineous   Wound Odor None   Tracy-Wound/Incision Assessment Intact   Wound Thickness Description Full thickness

## 2022-02-14 NOTE — PROGRESS NOTES
[address]  Consult Note/H&P/Progress Note      Aretha Morin  MEDICAL RECORD NUMBER:  [id number]  AGE: 33 y.o. RACE BLACK/  GENDER: female  : 1990  EPISODE DATE:  2022       Subjective:      CC (Chief Complaint) and HISTORY of PRESENT ILLNESS (HPI):    Aretha Morin is a 33 y.o. female who presents today for wound/ulcer evaluation. She is severely obese with BMI of 44.23 kg/m². She weighs 299 pounds. She underwent repeat  by Dr. Neelima Santo on 2021. It was a difficult repeat incision and was complicated by hematoma requiring evacuation by Dr. Preeti Mullen on 2022. Follow-up identified a large seroma and wound was opened for wet-to-dry dressings. That follow-up visit was on 2022. Wound care consult was placed for wound VAC which has been placed by home health. She comes for her first visit to the wound center on 1/10/2022. Wound VAC is being changed 3 times per week with gray foam.  Is on 2022. Wound VAC was discontinued by home health and she has only 3 small open areas of the wound that are being treated with wet-to-dry dressings.       This information was obtained from the patient  The following HPI elements were documented for the patient's wound:  Location: Lower abdomen Pfannenstiel incision  Duration: 2022  Severity:  +++  Context: Disrupted  wound with hematoma following  2021  Modifying Factors:  Nothing makes better or worse  Quality:    Timing:     Associated Signs and Symptoms: Severe obesity, wound hematoma      Ulcer Identification:  Ulcer Type: non-healing surgical    Contributing Factors: diabetes and obesity        PAST MEDICAL HISTORY  Past Medical History:   Diagnosis Date    39 weeks gestation of pregnancy 12/15/2019    Delivery with history of  2021    Diet controlled gestational diabetes mellitus (GDM) in second trimester 2021  Disorder of pregnancy 2021    Encounter for planned induction of labor 12/15/2019    Gestational hypertension     History of elective  8/1/2016    x2    Hydronephrosis of fetus in kenney pregnancy, antepartum 10/7/2021    Hypertension     PCOS (polycystic ovarian syndrome)         PAST SURGICAL HISTORY  Past Surgical History:   Procedure Laterality Date    HX OTHER SURGICAL  2014    EAB x2    HX TONSILLECTOMY  2010    HX WISDOM TEETH EXTRACTION         FAMILY HISTORY  Family History   Problem Relation Age of Onset    Hypertension Maternal Grandmother        SOCIAL HISTORY  Social History     Tobacco Use    Smoking status: Never Smoker    Smokeless tobacco: Never Used   Substance Use Topics    Alcohol use: No    Drug use: No       ALLERGIES  No Known Allergies    MEDICATIONS  Current Outpatient Medications on File Prior to Encounter   Medication Sig Dispense Refill    acetaminophen (TYLENOL) 500 mg tablet Take 500 mg by mouth every six (6) hours as needed for Pain.  oxyCODONE IR (ROXICODONE) 5 mg immediate release tablet Take 5 mg by mouth every four (4) hours as needed for Pain.  furosemide (Lasix) 20 mg tablet Take 20 mg by mouth daily. 2 tabs DAILY x 5 day; on DAY 2/5      calcium carbonate (TUMS) 200 mg calcium (500 mg) chew Take 1 Tablet by mouth daily as needed for PRN Reason (Other) (indegestion).  omeprazole (PRILOSEC) 40 mg capsule Take 1 Capsule by mouth daily. Indications: heartburn (Patient not taking: Reported on 2021) 30 Capsule 3    docosahexaenoic acid (Prenatal DHA) 200 mg cap capsule 1 cap by mouth daily        No current facility-administered medications on file prior to encounter. REVIEW OF SYSTEMS  The patient has no difficulty with chest pain or shortness of breath. No fever or chills. Comprehensive review of systems was otherwise unremarkable except as noted above.       Objective:   Physical Exam:   Recent vitals (if inpt):  Patient Vitals for the past 24 hrs:   BP Temp Pulse Resp SpO2 Height Weight   02/14/22 1118 (!) 166/98 98.4 °F (36.9 °C) 74 18 98 % 5' 9\" (1.753 m) 274 lb (124.3 kg)       Visit Vitals  BP (!) 166/98 (BP 1 Location: Right upper arm, BP Patient Position: Sitting)   Pulse 74   Temp 98.4 °F (36.9 °C)   Resp 18   Ht 5' 9\" (1.753 m)   Wt 274 lb (124.3 kg)   SpO2 98%   BMI 40.46 kg/m²     Wt Readings from Last 3 Encounters:   02/14/22 274 lb (124.3 kg)   01/31/22 280 lb (127 kg)   01/10/22 299 lb 8 oz (135.9 kg)     Constitutional: Alert, oriented, cooperative patient in no acute distress; appears stated age;  General appearance is within normal limits for wound care patient population. See the today's recorded vitals signs and constitutional data. Eyes: Pupils equal; Sclera are clear. EOMs intact; The eyes appear to track and move normally. The sclera are not injected. The conjunctive are clear. The eyelids are normal. There is no scleral icterus. ENMT: There are no obvious external ear, nose, lip or mouth lesions. Nares normal; Neck: Overall contour of the neck is normal with no obvious neck masses. Gross hearing is within normal limits. No obvious neck masses  Resp:  Breathing is non-labored; normal rate and effort; no audible wheezing. CV: RRR;  no JVD; No evidence of cyanosis of the upper extremities. The extremities are perfused without embolic sign, splinter hemorrhages, or petechia. GI: Obese, soft and non-distended without acute abnormality noted. Musculoskeletal: unremarkable with normal function. No embolic signs or cyanosis. Neuro:  Oriented; moves all 4; no focal deficits  Psychiatric: Judgement and insight are within normal limits for the wound care population of patients. Patient is oriented to person, place, and time. Recent and remote memory are within normal limits. Mood and affect are within normal limits. Integumentary (Skin/Wounds)  See inspection of wound(s) below.  There are no other skin areas of palpable concern. See wound center documentation including photos in the [address] Wound Template made part of this record by reference. Wounds:  Wound Abdomen #1 c section opened on lower abdomen (Active)   Wound Image   02/14/22 1135   Wound Etiology Non-Healing Surgical 02/14/22 1135   Dressing Status Clean;Dry; Intact 02/14/22 1135   Cleansed Cleansed with saline 02/14/22 1135   Dressing/Treatment Gauze dressing/dressing sponge 02/14/22 1135   Wound Length (cm) 0.1 cm 02/14/22 1135   Wound Width (cm) 9 cm 02/14/22 1135   Wound Depth (cm) 0.1 cm 02/14/22 1135   Wound Surface Area (cm^2) 0.9 cm^2 02/14/22 1135   Change in Wound Size % 98.15 02/14/22 1135   Wound Volume (cm^3) 0.09 cm^3 02/14/22 1135   Wound Healing % 100 02/14/22 1135   Distance Tunneling (cm) 1.1 cm 01/10/22 0914   Direction of Tunnel 9 o'clock 01/10/22 0914   Wound Assessment Epithelialization;Pink/red 02/14/22 1135   Drainage Amount Scant 02/14/22 1135   Drainage Description Sanguineous 02/14/22 1135   Wound Odor None 02/14/22 1135   Tracy-Wound/Incision Assessment Intact 02/14/22 1135   Wound Thickness Description Full thickness 02/14/22 1135   Number of days: 38       [REMOVED] Wound Abdomen (Removed)   Number of days: 857          Amount and/or Complexity of Data Reviewed and Analyzed:  I reviewed and analyzed all of the unique labs and radiologic studies that are shown below as well as any that are in the HPI, and any that are in the expanded problem list below  *Each unique test, order, or document contributes to the combination of 2 or combination of 3 in Category 1 below. I also independently reviewed radiology images for studies I described above in the HPI or studies I have ordered. For this visit I also reviewed old records and prior notes.     No results for input(s): WBC, HGB, PLT, NA, K, CL, CO2, BUN, CREA, GLU, PTP, INR, APTT, TBIL, TBILI, CBIL, ALT, AP, AML, AML, LPSE, LCAD, NH4, TROPT, TROIQ, PCO2, PO2, HCO3, HGBEXT, PLTEXT, INREXT, HGBEXT, PLTEXT, INREXT in the last 72 hours. No lab exists for component: SGOT, GPT,  PH  No results for input(s): PTP, INR, APTT, TBIL, TBILI, CBIL, ALT, AP, AML, LPSE, INREXT, INREXT in the last 72 hours. No lab exists for component: SGOT, GPT    Most recent blood counts (CBC) review  Lab Results   Component Value Date/Time    WBC 8.1 01/04/2022 08:40 AM    Hemoglobin (POC) 11.8 10/05/2017 08:48 AM    HGB 8.0 (L) 01/04/2022 08:40 AM    HCT 26.0 (L) 01/04/2022 08:40 AM    PLATELET 091 04/88/6762 08:40 AM    MCV 97.7 01/04/2022 08:40 AM     Most recent chemistry (BMP) test review   Lab Results   Component Value Date/Time    Sodium 141 01/04/2022 08:40 AM    Potassium 3.7 01/04/2022 08:40 AM    Chloride 111 (H) 01/04/2022 08:40 AM    CO2 23 01/04/2022 08:40 AM    Anion gap 7 01/04/2022 08:40 AM    Glucose 72 01/04/2022 08:40 AM    BUN 9 01/04/2022 08:40 AM    Creatinine 0.82 01/04/2022 08:40 AM    GFR est AA >60 01/04/2022 08:40 AM    GFR est non-AA >60 01/04/2022 08:40 AM    Calcium 8.3 01/04/2022 08:40 AM     Most recent Liver enzyme test review  Lab Results   Component Value Date/Time    ALT (SGPT) 21 01/03/2022 09:16 AM    AST (SGOT) 14 (L) 01/03/2022 09:16 AM    Alk.  phosphatase 75 01/03/2022 09:16 AM    Bilirubin, total 0.4 01/03/2022 09:16 AM     Most recent coagulation (coags) review  Lab Results   Component Value Date/Time    INR 0.9 01/01/2022 10:15 PM    Prothrombin time 12.6 01/01/2022 10:15 PM     Lab Results   Component Value Date/Time    INR 0.9 01/01/2022 10:15 PM    aPTT 27.3 01/01/2022 10:15 PM       Diabetic assessment  No results found for: HBA1C, MFN5MTXM, KAY8TGYH, CGT3KALI    Nutritional assessment screen to assess wound healing ability:  Lab Results   Component Value Date/Time    Protein, total 6.5 01/03/2022 09:16 AM    Albumin 2.4 (L) 01/03/2022 09:16 AM     Lab Results   Component Value Date/Time    Protein, total 6.5 01/03/2022 09:16 AM Albumin 2.4 (L) 01/03/2022 09:16 AM       XR Results (most recent):  Results from [place] encounter on 01/01/22    XR CHEST SNGL V    Narrative  Exam: XR CHEST SNGL V on 1/3/2022 1:14 PM    Clinical History: The Female patient is 33years old  presenting for COVID/ SOB. Comparison:  none    Findings:  Frontal view of the chest was obtained. The lung fields are clear. No pleural effusions are demonstrated. The  cardiomediastinal silhouette is within normal limits. There are no acute  osseous abnormalities. Impression  1. No acute cardiopulmonary process. CPT code(s) 18691      CT Results (most recent):  Results from Hospital Encounter encounter on 01/01/22    CT ABD PELV WO CONT    Narrative  CT ABD PELV WO CONT 1/1/2022 11:41 PM    HISTORY: Hematoma      TECHNIQUE: CT scan of the abdomen and pelvis was performed without IV contrast.  Multiplanar reformats were obtained. Dose reduction techniques were used. CONTRAST: None. FINDINGS:  LOWER CHEST: Normal.    HEPATOBILIARY: Normal.    PANCREAS: Normal.    SPLEEN: Normal.    ADRENAL GLANDS: Normal.    KIDNEYS/BLADDER: No hydronephrosis or renal calculi. Bladder is decompressed but  otherwise unremarkable. BOWEL: No bowel obstruction or diverticulitis. Appendix not visualized. LYMPH NODES: No enlarged abdominal or pelvic lymph nodes. VASCULATURE: Unremarkable. PELVIC ORGANS: Gravid uterus is noted. Anterior abdominal wall hematoma present  on image 74 of series 2 measuring 17.5 cm transverse dimension, 10.6 cm AP  dimension and 6.3 cm craniocaudal dimension. No free intrapelvic fluid. MUSCULOSKELETAL: Normal.    Impression  1. Large lower anterior abdominal wall hematoma. 2.  No acute intra-abdominal or pelvic abnormality.         Admission date (for inpatients): 2/14/2022   * No surgery found *  * No surgery found *    Assessment:      Problem List Items Addressed This Visit     None          Problem List  Date Reviewed: 2022          Codes Class Noted    Nonhealing surgical wound ICD-10-CM: T81.89XA  ICD-9-CM: 998.83  1/10/2022        COVID ICD-10-CM: U07.1  ICD-9-CM: 079.89  2022        Acute respiratory failure with hypoxia (HCC) ICD-10-CM: J96.01  ICD-9-CM: 518.81  1/3/2022        COVID-19 virus infection ICD-10-CM: U07.1  ICD-9-CM: 079.89  1/3/2022        Wound hematoma following  section, postpartum ICD-10-CM: O90.2  ICD-9-CM: 674.34  2022        Postpartum  wound disruption ICD-10-CM: O90.0  ICD-9-CM: 674.14  2022        Postoperative wound cellulitis ICD-10-CM: T81.49XA  ICD-9-CM: 998.59  2022        37 weeks gestation of pregnancy ICD-10-CM: Z3A.37  ICD-9-CM: V22.2  2021        Noncompliant pregnant patient in third trimester ICD-10-CM: O09.893, Z91.19  ICD-9-CM: V23.89, V15.81  2021    Overview Addendum 2021  8:38 AM by Vaughn Maldonado     2021 at [facility]: Pt has to be prompted to send blood sugar logs. Most recently, pt stated that she sends them 'before her appointments'. It has been stated to her verbally and in print that logs are to be sent weekly for review in order to adjust her insulin. Last log received was 11/3/2021. At today's appointment, pt only had 6 days worth. Stressed again the importance of compliance to patient. Hydronephrosis of fetus in kenney pregnancy, antepartum ICD-10-CM: O35. 8XX0  ICD-9-CM: 655.83  10/7/2021    Overview Addendum 2021  9:04 AM by Shania Clark     10/7/2021 at [facility]: Mild, bilateral seen today; Rt 4.7 mm, Lt 5.9 mm  2021 at [facility]: Resolved                 Chronic hypertension affecting pregnancy ICD-10-CM: O10.919  ICD-9-CM: 642.00  2021    Overview Addendum 2021 10:58 AM by Astrid Sena MD     2021 at [facility]: 162/62, 150/82  10/7/2021 at [facility]: Pt reports that she had stopped her Procardia per her OB d/t HAs and that her readings there were consistently WNL, was only on it for apprx. 2 weeks, BP today WNL. 11/3/2021 at [facility]: BP elevated today - pt reports dizziness/blurry vision when she 'feels her BP is up'. No longer taking procardia due to HA. Start labetalol 200 mg BID.  12/2/2021 at [facility]: BP elevated today but not severe, no HAs. Increase Labetalol to bid then tid as needed. .    · Continue twice weekly testing. · Delivery 37-39 wk due to [facility] on meds. Sooner if severe disease. · Get preeclamptic labs in your office for any elevated BPs or symptoms:  (CBC, CMP, LDH, Uric Acid); also do P/C ratio (if elevated, need to then confirm with 24h urine) or 24 hour urine (for total protein and creatinine clearance). High-risk pregnancy in third trimester ICD-10-CM: O09.93  ICD-9-CM: V23.9  8/24/2021    Overview Addendum 12/2/2021  9:01 AM by Shania Hopkins     LOW RISK NIPT; AFP negative    10/7/2021 at [facility]: Normal anatomy, limited echo d/t MBH. AC 58%, Overall 56%, DELFIN appears generous. Mild bilateral hydronephrosis seen today; Rt kidney 4.7 mm, Lt kidney 5.9 mm.  11/3/2021 at [facility]: Appropriate fetal growth AC 76%, Overall 75%, DELFIN 24 cm (generous),  BPP 8/8.  12/2/2021 at [facility]: Accelerated fetal growth AC 82%, Overall 72%, DELFIN 22cm, UA Dopplers WNL, BPP 8/8. · See GDM Overview for log review and recommendations. · Scheduled repeat C/S on 12/27/2021  · Continue twice weekly testing with primary OB             Obesity affecting pregnancy in third trimester ICD-10-CM: O99.213  ICD-9-CM: 649.13  8/24/2021        Severe obesity (BMI >= 40) (HCC) ICD-10-CM: E66.01  ICD-9-CM: 278.01  8/24/2021        Insulin controlled gestational diabetes mellitus (GDM) in third trimester ICD-10-CM: O24.414  ICD-9-CM: 648.83  8/24/2021    Overview Addendum 12/2/2021 11:07 AM by Arturo Hernandez MD     ....... 9/30/2021: Most readings elevated, adjust dose to LA Insulin 18/18.  10/7/2021 at [facility]: Glucose log reviewed. Ranges from 92 to 144. Most readings mildly elevated, improved. Increase dose to LA Insulin .   11/3/2021 at [facility]: Glucose log reviewed. Ranges from 95 to 138. Most all readings elevated, adjust dose. Levemir .   2021 at [facility]: Pt brought 6 days worth of readings. Ranges from [address]. Levemir . · Adjust insulin PRN remotely when sends in BS  · Continue wice weekly testing in OB office. · Repeat C/S at 39 weeks. · No follow up at [facility] CT made. H/O  section complicating pregnancy EQL-[id number]-ZE: O34.219  ICD-9-CM: 654.20  2021        Polycystic ovary affecting pregnancy, antepartum ICD-10-CM: O34.80, E28.2  ICD-9-CM: 646.83, 256.4  2020              Active Problems:    * No active hospital problems. *      Chemical Cautery    Performed by: Lino Manzo MD    Consent obtained? {Debridement LCU:37068}     Time out taken: {Debridement NMT:04851}    Tissue: {Tissue Chemical:40139}    Method: {Method Chemical:08255}    Location of Chemical Cautery:     Wound/Ulcer Number: {Wound Number:[id number]}     Wound Abdomen #1 c section opened on lower abdomen (Active)   Wound Image   22 1135   Wound Etiology Non-Healing Surgical 22 1135   Dressing Status Clean;Dry; Intact 22 1135   Cleansed Cleansed with saline 22 1135   Dressing/Treatment Gauze dressing/dressing sponge 22 1135   Wound Length (cm) 0.1 cm 22 1135   Wound Width (cm) 9 cm 22 1135   Wound Depth (cm) 0.1 cm 22 1135   Wound Surface Area (cm^2) 0.9 cm^2 22 1135   Change in Wound Size % 98.15 22 1135   Wound Volume (cm^3) 0.09 cm^3 22 1135   Wound Healing % 100 22 1135   Distance Tunneling (cm) 1.1 cm 01/10/22 0914   Direction of Tunnel 9 o'clock 01/10/22 0914   Wound Assessment Epithelialization;Pink/red 22 1135   Drainage Amount Scant 22 1135   Drainage Description Sanguineous 22 1135   Wound Odor None 22 1135   Tracy-Wound/Incision Assessment Intact 22 1135   Wound Thickness Description Full thickness 22 1135   Number of days: 38        Procedural Pain: {NUMBERS; 0-10:5044} / 10     Post Procedural Pain: {NUMBERS; 0-10:5044} / 10     Response to treatment: {Response to Treatment:37642}          Plan:     Number and Complexity of Problems addressed and   Risks of complications and/or morbidity of management    Treatment Note please see attached Discharge Instructions  Any procedures done today in the wound center are documented in a separate note in connect care made part of this record by reference  Written patient dismissal instructions given to patient or POA. Nonhealing surgical wound  Postpartum  wound disruption  Wound hematoma following  section, postpartum  Severe obesity (BMI >= 40) ([facility] [address]    She presents to the wound center on 1/10/2022. She had  on 2021 and hematoma evacuation on 2022. Wound VAC was recently placed and she is having home health change her back to 3 times per week. She is near completion on a 7-day course of Bactrim. There is no evidence of infection. She does not need to continue her antibiotics beyond this course. I do not find any culture results. Her wound is nicely granulating and pink. Suspect this will close quickly with the wound VAC. She will continue with home health VAC changes 3 times per week. She returns on 2022. Wounds are near closed. Wound VAC has been discontinued. She was receiving wet-to-dry dressings and we will convert that to Iodosorb. I will see her back in 2 weeks. Wound Care  Orders Placed This Encounter    WOUND CARE, DRESSING CHANGE     Abdominal Wound:  Patient may shower daily. Cleanse wound and periwound with wound cleanser or normal saline immediately after shower. Tuck thin piece of dry gauze into abdominal fold. Keep dry and change dressing daily. Chemical cautery performed today via silver nitrate by Dr. Trevor Wallace.      Standing Status:   Standing     Number of Occurrences:   1       Follow-up Information     Follow up With Specialties Details Why Contact Info    [address] In 2 weeks For wound re-check [address] [facility] [address] [phone]                  Level of MDM (2/3 elements below)  Number and Complexity of Problems Addressed Amount and/or Complexity of Data to be Reviewed and Analyzed  *Each unique test, order, or document contributes to the combination of 2 or combination of 3 in Category 1 below.  Risk of Complications and/or Morbidity or Mortality of pt Management     17213  79381 SF Minimal  1self-limited or minor problem Minimal or none Minimal risk of morbidity from additional diagnostic testing or Rx   47903  88918 Low Low  2or more self-limited or minor problems;    or  1stable chronic illness;    or  1IMPFU, uncomplicated illness or injury   Limited  (Must meet the requirements of at least 1 of the 2 categories)  Category 1: Tests and documents   Any combination of 2 from the following:  Review of prior external note(s) from each unique source*;  review of the result(s) of each unique test*;   ordering of each unique test*    or   Category 2: Assessment requiring an independent historian(s)  (For the categories of independent interpretation of tests and discussion of management or test interpretation, see moderate or high) Low risk of morbidity from additional diagnostic testing or treatment     23210  82273 Mod Moderate  1or more chronic illnesses with exacerbation, progression, or side effects of treatment;    or  2or more stable chronic illnesses;    or  1undiagnosed new problem with uncertain prognosis;    or  1acute illness with systemic symptoms;    or  0DXTCW complicated injury   Moderate  (Must meet the requirements of at least 1 out of 3 categories)  Category 1: Tests, documents, or independent historian(s)  Any combination of 3 from the following:   Review of prior external note(s) from each unique source*;  Review of the result(s) of each unique test*;  Ordering of each unique test*;  Assessment requiring an independent historian(s)    or  Category 2: Independent interpretation of tests   Independent interpretation of a test performed by another physician/other qualified health care professional (not separately reported);     or  Category 3: Discussion of management or test interpretation  Discussion of management or test interpretation with external physician/other qualified health care professional/appropriate source (not separately reported)   Moderate risk of morbidity from additional diagnostic testing or treatment  Examples only:  Prescription drug management   Decision regarding minor surgery with identified patient or procedure risk factors  Decision regarding elective major surgery without identified patient or procedure risk factors   Diagnosis or treatment significantly limited by social determinants of health       76100  37154 High High  1or more chronic illnesses with severe exacerbation, progression, or side effects of treatment;    or  1 acute or chronic illness or injury that poses a threat to life or bodily function   Extensive  (Must meet the requirements of at least 2 out of 3 categories)  Category 1: Tests, documents, or independent historian(s)  Any combination of 3 from the following:   Review of prior external note(s) from each unique source*;  Review of the result(s) of each unique test*;   Ordering of each unique test*;   Assessment requiring an independent historian(s)    or   Category 2: Independent interpretation of tests   Independent interpretation of a test performed by another physician/other qualified health care professional (not separately reported);     or  Category 3: Discussion of management or test interpretation  Discussion of management or test interpretation with external physician/other qualified health care professional/appropriate source (not separately reported)   High risk of morbidity from additional diagnostic testing or treatment  Examples only:  Drug therapy requiring intensive monitoring for toxicity  Decision regarding elective major surgery with identified patient or procedure risk factors  Decision regarding emergency major surgery  Decision regarding hospitalization  Decision not to resuscitate or to de-escalate care because of poor prognosis                 I have personally performed a face-to-face diagnostic evaluation and management  service on this patient. I have independently seen the patient. I have independently obtained the above history from the patient/family. I have independently examined the patient with above findings. I have independently reviewed data/labs for this patient and developed the above plan of care (MDM).   Electronically signed by Dash Louise MD on 2/14/2022 at 9:14 AM

## 2022-02-14 NOTE — DISCHARGE INSTRUCTIONS
[address]  [address], [address]   Phone: [phone]  Fax: [phone]    Patient: Soren Patterson MRN: [id number]  [id number]: [id number]    YOB: 1990  Age: 33 y.o. Sex: female       Return Appointment: 2 weeks with Shayne Butt MD    Instructions: Abdominal Wound:  Patient may shower daily. Cleanse wound and periwound with wound cleanser or normal saline immediately after shower. Tuck thin piece of dry gauze into abdominal fold. Keep dry and change dressing daily. Chemical cautery performed today via silver nitrate by Dr. Stewart Marques. Should you experience increased redness, swelling, pain, foul odor, size of wound(s), or have a temperature over 101 degrees please contact the [address] [place] [address] Road at [phone] or if after hours contact your primary care physician or go to the hospital emergency department.     Signed By: Pacheco Corrigan, PT, [address] [facility]     February 14, 2022

## 2022-02-28 ENCOUNTER — HOSPITAL ENCOUNTER (OUTPATIENT)
Dept: WOUND CARE | Age: 32
Discharge: HOME | End: 2022-02-28
Attending: SURGERY

## 2022-02-28 VITALS
BODY MASS INDEX: 40.14 KG/M2 | SYSTOLIC BLOOD PRESSURE: 154 MMHG | TEMPERATURE: 98.1 F | HEIGHT: 69 IN | WEIGHT: 271 LBS | HEART RATE: 64 BPM | DIASTOLIC BLOOD PRESSURE: 109 MMHG

## 2022-02-28 NOTE — WOUND CARE
02/28/22 1147   Wound Abdomen #1 c section opened on lower abdomen   Date First Assessed/Time First Assessed: 01/07/22 0913   Present on Hospital Admission: Yes  Wound Approximate Age at First Assessment (Weeks): 2 weeks  Primary Wound Type: Open incision/surgical site  Location: Abdomen  Wound Description: #1 c sectio. ..    Wound Image    Wound Etiology Non-Healing Surgical   Dressing Status Intact   Cleansed Not cleansed   Dressing/Treatment Open to air   Wound Length (cm) 0 cm   Wound Width (cm) 0 cm   Wound Depth (cm) 0 cm   Wound Surface Area (cm^2) 0 cm^2   Change in Wound Size % 100   Wound Volume (cm^3) 0 cm^3   Wound Healing % 100   Wound Assessment Epithelialization   Drainage Amount None   Wound Odor None   Tracy-Wound/Incision Assessment Intact

## 2022-02-28 NOTE — WOUND CARE
[address]  [address], [address]  Phone: [phone]  Fax: [phone]    Patient: Kristi Dubose MRN: [id number]  SSN: [id number]    YOB: 1990  Age: 33 y.o. Sex: female       Return Appointment: discharge from wound center with Kasi Brooks MD    Instructions: Abdominal wound is healed  May return to normal activity  Discharge from wound center    Should you experience increased redness, swelling, pain, foul odor, size of wound(s), or have a temperature over 101 degrees please contact the [address] [place] [address] Road at [phone] or if after hours contact your primary care physician or go to the hospital emergency department.     Signed By: Ivon Zelaya RN     February 28, 2022

## 2022-02-28 NOTE — PROGRESS NOTES
[address]  Consult Note/H&P/Progress Note      Ashok Morrison  MEDICAL RECORD NUMBER:  [id number]  AGE: 33 y.o. RACE BLACK/  GENDER: female  : 1990  EPISODE DATE:  2022       Subjective:      CC (Chief Complaint) and HISTORY of PRESENT ILLNESS (HPI):    Ashok Morrison is a 33 y.o. female who presents today for wound/ulcer evaluation. She is severely obese with BMI of 44.23 kg/m². She weighs 299 pounds. She underwent repeat  by Dr. Mia Mohs on 2021. It was a difficult repeat incision and was complicated by hematoma requiring evacuation by Dr. Kaci Dill on 2022. Follow-up identified a large seroma and wound was opened for wet-to-dry dressings. That follow-up visit was on 2022. Wound care consult was placed for wound VAC which has been placed by home health. She comes for her first visit to the wound center on 1/10/2022. Wound VAC is being changed 3 times per week with gray foam.  Is on 2022. Wound VAC was discontinued by home health and she has only 3 small open areas of the wound that are being treated with wet-to-dry dressings.       This information was obtained from the patient  The following HPI elements were documented for the patient's wound:  Location: Lower abdomen Pfannenstiel incision  Duration: 2022  Severity:  +++  Context: Disrupted  wound with hematoma following  2021  Modifying Factors:  Nothing makes better or worse  Quality:    Timing:     Associated Signs and Symptoms: Severe obesity, wound hematoma      Ulcer Identification:  Ulcer Type: non-healing surgical    Contributing Factors: diabetes and obesity        PAST MEDICAL HISTORY  Past Medical History:   Diagnosis Date    39 weeks gestation of pregnancy 12/15/2019    Delivery with history of  2021    Diet controlled gestational diabetes mellitus (GDM) in second trimester 2021  Disorder of pregnancy 2021    Encounter for planned induction of labor 12/15/2019    Gestational hypertension     History of elective  8/1/2016    x2    Hydronephrosis of fetus in kenney pregnancy, antepartum 10/7/2021    Hypertension     PCOS (polycystic ovarian syndrome)         PAST SURGICAL HISTORY  Past Surgical History:   Procedure Laterality Date    HX OTHER SURGICAL  2014    EAB x2    HX TONSILLECTOMY  2010    HX WISDOM TEETH EXTRACTION         FAMILY HISTORY  Family History   Problem Relation Age of Onset    Hypertension Maternal Grandmother        SOCIAL HISTORY  Social History     Tobacco Use    Smoking status: Never Smoker    Smokeless tobacco: Never Used   Substance Use Topics    Alcohol use: No    Drug use: No       ALLERGIES  No Known Allergies    MEDICATIONS  Current Outpatient Medications on File Prior to Encounter   Medication Sig Dispense Refill    acetaminophen (TYLENOL) 500 mg tablet Take 500 mg by mouth every six (6) hours as needed for Pain.  oxyCODONE IR (ROXICODONE) 5 mg immediate release tablet Take 5 mg by mouth every four (4) hours as needed for Pain.  furosemide (Lasix) 20 mg tablet Take 20 mg by mouth daily. 2 tabs DAILY x 5 day; on DAY 2/5      calcium carbonate (TUMS) 200 mg calcium (500 mg) chew Take 1 Tablet by mouth daily as needed for PRN Reason (Other) (indegestion).  omeprazole (PRILOSEC) 40 mg capsule Take 1 Capsule by mouth daily. Indications: heartburn (Patient not taking: Reported on 2021) 30 Capsule 3    docosahexaenoic acid (Prenatal DHA) 200 mg cap capsule 1 cap by mouth daily        No current facility-administered medications on file prior to encounter. REVIEW OF SYSTEMS  The patient has no difficulty with chest pain or shortness of breath. No fever or chills. Comprehensive review of systems was otherwise unremarkable except as noted above.       Objective:   Physical Exam:   Recent vitals (if inpt):  Patient Vitals for the past 24 hrs:   BP Temp Pulse Height Weight   02/28/22 1146 (!) 154/109 98.1 °F (36.7 °C) 64     02/28/22 1126    5' 9\" (1.753 m) 271 lb (122.9 kg)       Visit Vitals  BP (!) 154/109 (BP 1 Location: Left upper arm, BP Patient Position: At rest)   Pulse 64   Temp 98.1 °F (36.7 °C)   Ht 5' 9\" (1.753 m)   Wt 271 lb (122.9 kg)   BMI 40.02 kg/m²     Wt Readings from Last 3 Encounters:   02/28/22 271 lb (122.9 kg)   02/14/22 274 lb (124.3 kg)   01/31/22 280 lb (127 kg)     Constitutional: Alert, oriented, cooperative patient in no acute distress; appears stated age;  General appearance is within normal limits for wound care patient population. See the today's recorded vitals signs and constitutional data. Eyes: Pupils equal; Sclera are clear. EOMs intact; The eyes appear to track and move normally. The sclera are not injected. The conjunctive are clear. The eyelids are normal. There is no scleral icterus. ENMT: There are no obvious external ear, nose, lip or mouth lesions. Nares normal; Neck: Overall contour of the neck is normal with no obvious neck masses. Gross hearing is within normal limits. No obvious neck masses  Resp:  Breathing is non-labored; normal rate and effort; no audible wheezing. CV: RRR;  no JVD; No evidence of cyanosis of the upper extremities. The extremities are perfused without embolic sign, splinter hemorrhages, or petechia. GI: Obese, soft and non-distended without acute abnormality noted. Musculoskeletal: unremarkable with normal function. No embolic signs or cyanosis. Neuro:  Oriented; moves all 4; no focal deficits  Psychiatric: Judgement and insight are within normal limits for the wound care population of patients. Patient is oriented to person, place, and time. Recent and remote memory are within normal limits. Mood and affect are within normal limits. Integumentary (Skin/Wounds)  See inspection of wound(s) below.  There are no other skin areas of palpable concern. See wound center documentation including photos in the [address] Wound Template made part of this record by reference. Wounds:  Wound Abdomen #1 c section opened on lower abdomen (Active)   Wound Image   02/28/22 1147   Wound Etiology Non-Healing Surgical 02/28/22 1147   Dressing Status Intact 02/28/22 1147   Cleansed Not cleansed 02/28/22 1147   Dressing/Treatment Open to air 02/28/22 1147   Wound Length (cm) 0 cm 02/28/22 1147   Wound Width (cm) 0 cm 02/28/22 1147   Wound Depth (cm) 0 cm 02/28/22 1147   Wound Surface Area (cm^2) 0 cm^2 02/28/22 1147   Change in Wound Size % 100 02/28/22 1147   Wound Volume (cm^3) 0 cm^3 02/28/22 1147   Wound Healing % 100 02/28/22 1147   Distance Tunneling (cm) 1.1 cm 01/10/22 0914   Direction of Tunnel 9 o'clock 01/10/22 0914   Wound Assessment Epithelialization 02/28/22 1147   Drainage Amount None 02/28/22 1147   Drainage Description Sanguineous 02/14/22 1135   Wound Odor None 02/28/22 1147   Tracy-Wound/Incision Assessment Intact 02/28/22 1147   Wound Thickness Description Full thickness 02/14/22 1135   Number of days: 52       [REMOVED] Wound Abdomen (Removed)   Number of days: 124          Amount and/or Complexity of Data Reviewed and Analyzed:  I reviewed and analyzed all of the unique labs and radiologic studies that are shown below as well as any that are in the HPI, and any that are in the expanded problem list below  *Each unique test, order, or document contributes to the combination of 2 or combination of 3 in Category 1 below. I also independently reviewed radiology images for studies I described above in the HPI or studies I have ordered. For this visit I also reviewed old records and prior notes.     No results for input(s): WBC, HGB, PLT, NA, K, CL, CO2, BUN, CREA, GLU, PTP, INR, APTT, TBIL, TBILI, CBIL, ALT, AP, AML, AML, LPSE, LCAD, NH4, TROPT, TROIQ, PCO2, PO2, HCO3, HGBEXT, PLTEXT, INREXT, HGBEXT, PLTEXT, INREXT in the last 72 hours. No lab exists for component: SGOT, GPT,  PH  No results for input(s): PTP, INR, APTT, TBIL, TBILI, CBIL, ALT, AP, AML, LPSE, INREXT, INREXT in the last 72 hours. No lab exists for component: SGOT, GPT    Most recent blood counts (CBC) review  Lab Results   Component Value Date/Time    WBC 8.1 01/04/2022 08:40 AM    Hemoglobin (POC) 11.8 10/05/2017 08:48 AM    HGB 8.0 (L) 01/04/2022 08:40 AM    HCT 26.0 (L) 01/04/2022 08:40 AM    PLATELET 871 27/26/4453 08:40 AM    MCV 97.7 01/04/2022 08:40 AM     Most recent chemistry (BMP) test review   Lab Results   Component Value Date/Time    Sodium 141 01/04/2022 08:40 AM    Potassium 3.7 01/04/2022 08:40 AM    Chloride 111 (H) 01/04/2022 08:40 AM    CO2 23 01/04/2022 08:40 AM    Anion gap 7 01/04/2022 08:40 AM    Glucose 72 01/04/2022 08:40 AM    BUN 9 01/04/2022 08:40 AM    Creatinine 0.82 01/04/2022 08:40 AM    GFR est AA >60 01/04/2022 08:40 AM    GFR est non-AA >60 01/04/2022 08:40 AM    Calcium 8.3 01/04/2022 08:40 AM     Most recent Liver enzyme test review  Lab Results   Component Value Date/Time    ALT (SGPT) 21 01/03/2022 09:16 AM    AST (SGOT) 14 (L) 01/03/2022 09:16 AM    Alk.  phosphatase 75 01/03/2022 09:16 AM    Bilirubin, total 0.4 01/03/2022 09:16 AM     Most recent coagulation (coags) review  Lab Results   Component Value Date/Time    INR 0.9 01/01/2022 10:15 PM    Prothrombin time 12.6 01/01/2022 10:15 PM     Lab Results   Component Value Date/Time    INR 0.9 01/01/2022 10:15 PM    aPTT 27.3 01/01/2022 10:15 PM       Diabetic assessment  No results found for: HBA1C, XSG5BIGE, JVC2HESH, EZQ7FCES    Nutritional assessment screen to assess wound healing ability:  Lab Results   Component Value Date/Time    Protein, total 6.5 01/03/2022 09:16 AM    Albumin 2.4 (L) 01/03/2022 09:16 AM     Lab Results   Component Value Date/Time    Protein, total 6.5 01/03/2022 09:16 AM    Albumin 2.4 (L) 01/03/2022 09:16 AM       XR Results (most recent):  Results from [place] encounter on 01/01/22    XR CHEST SNGL V    Narrative  Exam: XR CHEST SNGL V on 1/3/2022 1:14 PM    Clinical History: The Female patient is 33years old  presenting for COVID/ SOB. Comparison:  none    Findings:  Frontal view of the chest was obtained. The lung fields are clear. No pleural effusions are demonstrated. The  cardiomediastinal silhouette is within normal limits. There are no acute  osseous abnormalities. Impression  1. No acute cardiopulmonary process. CPT code(s) 50969      CT Results (most recent):  Results from Hospital Encounter encounter on 01/01/22    CT ABD PELV WO CONT    Narrative  CT ABD PELV WO CONT 1/1/2022 11:41 PM    HISTORY: Hematoma      TECHNIQUE: CT scan of the abdomen and pelvis was performed without IV contrast.  Multiplanar reformats were obtained. Dose reduction techniques were used. CONTRAST: None. FINDINGS:  LOWER CHEST: Normal.    HEPATOBILIARY: Normal.    PANCREAS: Normal.    SPLEEN: Normal.    ADRENAL GLANDS: Normal.    KIDNEYS/BLADDER: No hydronephrosis or renal calculi. Bladder is decompressed but  otherwise unremarkable. BOWEL: No bowel obstruction or diverticulitis. Appendix not visualized. LYMPH NODES: No enlarged abdominal or pelvic lymph nodes. VASCULATURE: Unremarkable. PELVIC ORGANS: Gravid uterus is noted. Anterior abdominal wall hematoma present  on image 74 of series 2 measuring 17.5 cm transverse dimension, 10.6 cm AP  dimension and 6.3 cm craniocaudal dimension. No free intrapelvic fluid. MUSCULOSKELETAL: Normal.    Impression  1. Large lower anterior abdominal wall hematoma. 2.  No acute intra-abdominal or pelvic abnormality.         Admission date (for inpatients): 2/28/2022   * No surgery found *  * No surgery found *    Assessment:      Problem List Items Addressed This Visit     None          Problem List  Date Reviewed: 1/1/2022          Codes Class Noted    Nonhealing surgical wound ICD-10-CM: T81.89XA  ICD-9-CM: 998.83  1/10/2022        COVID ICD-10-CM: U07.1  ICD-9-CM: 079.89  2022        Acute respiratory failure with hypoxia (HCC) ICD-10-CM: J96.01  ICD-9-CM: 518.81  1/3/2022        COVID-19 virus infection ICD-10-CM: U07.1  ICD-9-CM: 079.89  1/3/2022        Wound hematoma following  section, postpartum ICD-10-CM: O90.2  ICD-9-CM: 674.34  2022        Postpartum  wound disruption ICD-10-CM: O90.0  ICD-9-CM: 674.14  2022        Postoperative wound cellulitis ICD-10-CM: T81.49XA  ICD-9-CM: 998.59  2022        37 weeks gestation of pregnancy ICD-10-CM: Z3A.37  ICD-9-CM: V22.2  2021        Noncompliant pregnant patient in third trimester ICD-10-CM: O09.893, Z91.19  ICD-9-CM: V23.89, V15.81  2021    Overview Addendum 2021  8:38 AM by Keyanna Alvarado     2021 at [facility]: Pt has to be prompted to send blood sugar logs. Most recently, pt stated that she sends them 'before her appointments'. It has been stated to her verbally and in print that logs are to be sent weekly for review in order to adjust her insulin. Last log received was 11/3/2021. At today's appointment, pt only had 6 days worth. Stressed again the importance of compliance to patient. Hydronephrosis of fetus in [place] pregnancy, antepartum ICD-10-CM: O35. 8XX0  ICD-9-CM: 655.83  10/7/2021    Overview Addendum 2021  9:04 AM by Shania Gu     10/7/2021 at [facility]: Mild, bilateral seen today; Rt 4.7 mm, Lt 5.9 mm  2021 at [facility]: Resolved                 Chronic hypertension affecting pregnancy ICD-10-CM: O10.919  ICD-9-CM: 642.00  2021    Overview Addendum 2021 10:58 AM by Francesca Zaldivar MD     2021 at [facility]: 162/62, 150/82  10/7/2021 at [facility]: Pt reports that she had stopped her Procardia per her OB d/t HAs and that her readings there were consistently WNL, was only on it for apprx. 2 weeks, BP today WNL.   11/3/2021 at [facility]: BP elevated today - pt reports dizziness/blurry vision when she 'feels her BP is up'. No longer taking procardia due to HA. Start labetalol 200 mg BID.  12/2/2021 at [facility]: BP elevated today but not severe, no HAs. Increase Labetalol to bid then tid as needed. .    · Continue twice weekly testing. · Delivery 37-39 wk due to [facility] on meds. Sooner if severe disease. · Get preeclamptic labs in your office for any elevated BPs or symptoms:  (CBC, CMP, LDH, Uric Acid); also do P/C ratio (if elevated, need to then confirm with 24h urine) or 24 hour urine (for total protein and creatinine clearance). High-risk pregnancy in third trimester ICD-10-CM: O09.93  ICD-9-CM: V23.9  8/24/2021    Overview Addendum 12/2/2021  9:01 AM by Shania Byrne     LOW RISK NIPT; AFP negative    10/7/2021 at [facility]: Normal anatomy, limited echo d/t MBH. AC 58%, Overall 56%, DELFIN appears generous. Mild bilateral hydronephrosis seen today; Rt kidney 4.7 mm, Lt kidney 5.9 mm.  11/3/2021 at [facility]: Appropriate fetal growth AC 76%, Overall 75%, DELFIN 24 cm (generous),  BPP 8/8.  12/2/2021 at [facility]: Accelerated fetal growth AC 82%, Overall 72%, DELFIN 22cm, UA Dopplers WNL, BPP 8/8. · See GDM Overview for log review and recommendations. · Scheduled repeat C/S on 12/27/2021  · Continue twice weekly testing with primary OB             Obesity affecting pregnancy in third trimester ICD-10-CM: O99.213  ICD-9-CM: 649.13  8/24/2021        Severe obesity (BMI >= 40) (HCC) ICD-10-CM: E66.01  ICD-9-CM: 278.01  8/24/2021        Insulin controlled gestational diabetes mellitus (GDM) in third trimester ICD-10-CM: O24.414  ICD-9-CM: 648.83  8/24/2021    Overview Addendum 12/2/2021 11:07 AM by Megan Morataya MD     ....... 9/30/2021: Most readings elevated, adjust dose to LA Insulin 18/18.  10/7/2021 at [facility]: Glucose log reviewed. Ranges from 92 to 144. Most readings mildly elevated, improved.  Increase dose to LA Insulin 22/22.   11/3/2021 at [facility]: Glucose log reviewed. Ranges from 95 to 138. Most all readings elevated, adjust dose. Levemir 26/26.   2021 at [facility]: Pt brought 6 days worth of readings. Ranges from [address]. Levemir 30/30. · Adjust insulin PRN remotely when sends in BS  · Continue wice weekly testing in OB office. · Repeat C/S at 39 weeks. · No follow up at [facility] CT made. H/O  section complicating pregnancy IIS-50-DN: O34.219  ICD-9-CM: 654.20  2021        Polycystic ovary affecting pregnancy, antepartum ICD-10-CM: O34.80, E28.2  ICD-9-CM: 646.83, 256.4  2020              Active Problems:    * No active hospital problems. *          Plan:     Number and Complexity of Problems addressed and   Risks of complications and/or morbidity of management    Treatment Note please see attached Discharge Instructions  Any procedures done today in the wound center are documented in a separate note in connect care made part of this record by reference  Written patient dismissal instructions given to patient or POA. Nonhealing surgical wound  Postpartum  wound disruption  Wound hematoma following  section, postpartum  Severe obesity (BMI >= 40) ([facility] [address]    She presents to the wound center on 1/10/2022. She had  on 2021 and hematoma evacuation on 2022. Wound VAC was recently placed and she is having home health change her back to 3 times per week. She is near completion on a 7-day course of Bactrim. There is no evidence of infection. She does not need to continue her antibiotics beyond this course. I do not find any culture results. Her wound is nicely granulating and pink. Suspect this will close quickly with the wound VAC. She will continue with home health VAC changes 3 times per week. She returns on 2022. Wounds are near closed. Wound VAC has been discontinued. She was receiving wet-to-dry dressings and we will convert that to Iodosorb.   I will see her back in 2 weeks. Wound Care  Orders Placed This Encounter    WOUND CARE, DRESSING CHANGE     Abdominal wound is healed  May return to normal activity  Discharge from wound center     Standing Status:   Standing     Number of Occurrences:   1       Follow-up Information     Follow up With Specialties Details Why Contact Info    [address]  discharge from wound center [place] [id number] [facility] [address] [phone]                  Level of MDM (2/3 elements below)  Number and Complexity of Problems Addressed Amount and/or Complexity of Data to be Reviewed and Analyzed  *Each unique test, order, or document contributes to the combination of 2 or combination of 3 in Category 1 below.  Risk of Complications and/or Morbidity or Mortality of pt Management     61357  34257 SF Minimal  1self-limited or minor problem Minimal or none Minimal risk of morbidity from additional diagnostic testing or Rx   25141  22590 Low Low  2or more self-limited or minor problems;    or  1stable chronic illness;    or  0DLVMN, uncomplicated illness or injury   Limited  (Must meet the requirements of at least 1 of the 2 categories)  Category 1: Tests and documents   Any combination of 2 from the following:  Review of prior external note(s) from each unique source*;  review of the result(s) of each unique test*;   ordering of each unique test*    or   Category 2: Assessment requiring an independent historian(s)  (For the categories of independent interpretation of tests and discussion of management or test interpretation, see moderate or high) Low risk of morbidity from additional diagnostic testing or treatment     19284  79715 Mod Moderate  1or more chronic illnesses with exacerbation, progression, or side effects of treatment;    or  2or more stable chronic illnesses;    or  1undiagnosed new problem with uncertain prognosis;    or  1acute illness with systemic symptoms;    or  1acute complicated injury   Moderate  (Must meet the requirements of at least 1 out of 3 categories)  Category 1: Tests, documents, or independent historian(s)  Any combination of 3 from the following:   Review of prior external note(s) from each unique source*;  Review of the result(s) of each unique test*;  Ordering of each unique test*;  Assessment requiring an independent historian(s)    or  Category 2: Independent interpretation of tests   Independent interpretation of a test performed by another physician/other qualified health care professional (not separately reported);     or  Category 3: Discussion of management or test interpretation  Discussion of management or test interpretation with external physician/other qualified health care professional/appropriate source (not separately reported)   Moderate risk of morbidity from additional diagnostic testing or treatment  Examples only:  Prescription drug management   Decision regarding minor surgery with identified patient or procedure risk factors  Decision regarding elective major surgery without identified patient or procedure risk factors   Diagnosis or treatment significantly limited by social determinants of health       94626  39658 High High  1or more chronic illnesses with severe exacerbation, progression, or side effects of treatment;    or  1 acute or chronic illness or injury that poses a threat to life or bodily function   Extensive  (Must meet the requirements of at least 2 out of 3 categories)  Category 1: Tests, documents, or independent historian(s)  Any combination of 3 from the following:   Review of prior external note(s) from each unique source*;  Review of the result(s) of each unique test*;   Ordering of each unique test*;   Assessment requiring an independent historian(s)    or   Category 2: Independent interpretation of tests   Independent interpretation of a test performed by another physician/other qualified health care professional (not separately reported);     or  Category 3: Discussion of management or test interpretation  Discussion of management or test interpretation with external physician/other qualified health care professional/appropriate source (not separately reported)   High risk of morbidity from additional diagnostic testing or treatment  Examples only:  Drug therapy requiring intensive monitoring for toxicity  Decision regarding elective major surgery with identified patient or procedure risk factors  Decision regarding emergency major surgery  Decision regarding hospitalization  Decision not to resuscitate or to de-escalate care because of poor prognosis                 I have personally performed a face-to-face diagnostic evaluation and management  service on this patient. I have independently seen the patient. I have independently obtained the above history from the patient/family. I have independently examined the patient with above findings. I have independently reviewed data/labs for this patient and developed the above plan of care (MDM).   Electronically signed by Rosalinda Noonan MD on 2/28/2022 at 9:14 AM

## 2022-02-28 NOTE — DISCHARGE INSTRUCTIONS
[facility] [address]   Phone: [phone]  Fax: [phone]    Patient: Marquis Mendez MRN: [id number]  SSN: [id number]    YOB: 1990  Age: 33 y.o. Sex: female       Return Appointment: discharge from wound center with Keiry Bruno MD    Instructions: Abdominal wound is healed  May return to normal activity  Discharge from wound center    Should you experience increased redness, swelling, pain, foul odor, size of wound(s), or have a temperature over 101 degrees please contact the [address] [place] [address] Road at [phone] or if after hours contact your primary care physician or go to the hospital emergency department.     Signed By: Toni Vieyra RN     February 28, 2022

## 2022-03-18 PROBLEM — O99.213 OBESITY AFFECTING PREGNANCY IN THIRD TRIMESTER: Status: ACTIVE | Noted: 2021-08-24

## 2022-03-18 PROBLEM — O34.219 H/O CESAREAN SECTION COMPLICATING PREGNANCY: Status: ACTIVE | Noted: 2021-08-02

## 2022-03-19 PROBLEM — T81.49XA POSTOPERATIVE WOUND CELLULITIS: Status: ACTIVE | Noted: 2022-01-01

## 2022-03-19 PROBLEM — Z3A.37 37 WEEKS GESTATION OF PREGNANCY: Status: ACTIVE | Noted: 2021-12-27

## 2022-03-19 PROBLEM — O10.919 CHRONIC HYPERTENSION AFFECTING PREGNANCY: Status: ACTIVE | Noted: 2021-08-25

## 2022-03-19 PROBLEM — E28.2 POLYCYSTIC OVARY AFFECTING PREGNANCY, ANTEPARTUM: Status: ACTIVE | Noted: 2020-09-02

## 2022-03-19 PROBLEM — O09.93 HIGH-RISK PREGNANCY IN THIRD TRIMESTER: Status: ACTIVE | Noted: 2021-08-24

## 2022-03-19 PROBLEM — Z91.199 NONCOMPLIANT PREGNANT PATIENT IN THIRD TRIMESTER: Status: ACTIVE | Noted: 2021-12-02

## 2022-03-19 PROBLEM — J96.01 ACUTE RESPIRATORY FAILURE WITH HYPOXIA (HCC): Status: ACTIVE | Noted: 2022-01-03

## 2022-03-19 PROBLEM — E66.01 SEVERE OBESITY (BMI >= 40) (HCC): Status: ACTIVE | Noted: 2021-08-24

## 2022-03-19 PROBLEM — O09.893 NONCOMPLIANT PREGNANT PATIENT IN THIRD TRIMESTER: Status: ACTIVE | Noted: 2021-12-02

## 2022-03-19 PROBLEM — T81.89XA NONHEALING SURGICAL WOUND: Status: ACTIVE | Noted: 2022-01-10

## 2022-03-19 PROBLEM — O34.80 POLYCYSTIC OVARY AFFECTING PREGNANCY, ANTEPARTUM: Status: ACTIVE | Noted: 2020-09-02

## 2022-03-19 PROBLEM — U07.1 COVID-19 VIRUS INFECTION: Status: ACTIVE | Noted: 2022-01-03

## 2022-03-19 PROBLEM — U07.1 COVID: Status: ACTIVE | Noted: 2022-01-04

## 2022-03-20 PROBLEM — O24.414 INSULIN CONTROLLED GESTATIONAL DIABETES MELLITUS (GDM) IN THIRD TRIMESTER: Status: ACTIVE | Noted: 2021-08-24

## 2022-03-20 PROBLEM — O35.EXX0 HYDRONEPHROSIS OF FETUS IN SINGLETON PREGNANCY, ANTEPARTUM: Status: ACTIVE | Noted: 2021-10-07

## 2022-12-19 ENCOUNTER — ANESTHESIA EVENT (OUTPATIENT)
Dept: SURGERY | Age: 32
End: 2022-12-19
Payer: COMMERCIAL

## 2022-12-20 ENCOUNTER — ANESTHESIA (OUTPATIENT)
Dept: SURGERY | Age: 32
End: 2022-12-20
Payer: COMMERCIAL

## 2022-12-20 ENCOUNTER — HOSPITAL ENCOUNTER (OUTPATIENT)
Age: 32
Setting detail: OBSERVATION
Discharge: HOME | End: 2022-12-20
Attending: STUDENT IN AN ORGANIZED HEALTH CARE EDUCATION/TRAINING PROGRAM | Admitting: STUDENT IN AN ORGANIZED HEALTH CARE EDUCATION/TRAINING PROGRAM
Payer: COMMERCIAL

## 2022-12-20 VITALS
RESPIRATION RATE: 16 BRPM | OXYGEN SATURATION: 75 % | TEMPERATURE: 97.8 F | SYSTOLIC BLOOD PRESSURE: 128 MMHG | DIASTOLIC BLOOD PRESSURE: 68 MMHG | HEART RATE: 73 BPM

## 2022-12-20 PROBLEM — O03.4 INCOMPLETE ABORTION WITHOUT COMPLICATIONS: Status: ACTIVE | Noted: 2022-12-20

## 2022-12-20 LAB
ABO + RH BLD: NORMAL
APPEARANCE UR: CLEAR
BACTERIA URNS QL MICRO: NEGATIVE /HPF
BILIRUB UR QL: NEGATIVE
BLOOD GROUP ANTIBODIES SERPL: NORMAL
CASTS URNS QL MICRO: ABNORMAL /LPF
COLOR UR: ABNORMAL
EPI CELLS #/AREA URNS HPF: ABNORMAL /HPF
ERYTHROCYTE [DISTWIDTH] IN BLOOD BY AUTOMATED COUNT: 12.4 % (ref 11.9–14.6)
GLUCOSE UR STRIP.AUTO-MCNC: NEGATIVE MG/DL
HCT VFR BLD AUTO: 38 % (ref 35.8–46.3)
HGB BLD-MCNC: 12.4 G/DL (ref 11.7–15.4)
HGB UR QL STRIP: ABNORMAL
KETONES UR QL STRIP.AUTO: NEGATIVE MG/DL
LEUKOCYTE ESTERASE UR QL STRIP.AUTO: NEGATIVE
MCH RBC QN AUTO: 31.6 PG (ref 26.1–32.9)
MCHC RBC AUTO-ENTMCNC: 32.6 G/DL (ref 31.4–35)
MCV RBC AUTO: 96.9 FL (ref 82–102)
NITRITE UR QL STRIP.AUTO: NEGATIVE
NRBC # BLD: 0 K/UL (ref 0–0.2)
PH UR STRIP: 5.5 [PH] (ref 5–9)
PLATELET # BLD AUTO: 179 K/UL (ref 150–450)
PMV BLD AUTO: 10.7 FL (ref 9.4–12.3)
PROT UR STRIP-MCNC: NEGATIVE MG/DL
RBC # BLD AUTO: 3.92 M/UL (ref 4.05–5.2)
RBC #/AREA URNS HPF: >100 /HPF
SP GR UR REFRACTOMETRY: 1.02 (ref 1–1.02)
SPECIMEN EXP DATE BLD: NORMAL
UROBILINOGEN UR QL STRIP.AUTO: 0.2 EU/DL (ref 0.2–1)
WBC # BLD AUTO: 5.8 K/UL (ref 4.3–11.1)
WBC URNS QL MICRO: ABNORMAL /HPF

## 2022-12-20 PROCEDURE — 2580000003 HC RX 258: Performed by: STUDENT IN AN ORGANIZED HEALTH CARE EDUCATION/TRAINING PROGRAM

## 2022-12-20 PROCEDURE — 7100000010 HC PHASE II RECOVERY - FIRST 15 MIN: Performed by: STUDENT IN AN ORGANIZED HEALTH CARE EDUCATION/TRAINING PROGRAM

## 2022-12-20 PROCEDURE — 3600000013 HC SURGERY LEVEL 3 ADDTL 15MIN: Performed by: STUDENT IN AN ORGANIZED HEALTH CARE EDUCATION/TRAINING PROGRAM

## 2022-12-20 PROCEDURE — 2709999900 HC NON-CHARGEABLE SUPPLY: Performed by: STUDENT IN AN ORGANIZED HEALTH CARE EDUCATION/TRAINING PROGRAM

## 2022-12-20 PROCEDURE — 86900 BLOOD TYPING SEROLOGIC ABO: CPT

## 2022-12-20 PROCEDURE — 85027 COMPLETE CBC AUTOMATED: CPT

## 2022-12-20 PROCEDURE — 6360000002 HC RX W HCPCS: Performed by: ANESTHESIOLOGY

## 2022-12-20 PROCEDURE — 6360000002 HC RX W HCPCS: Performed by: NURSE ANESTHETIST, CERTIFIED REGISTERED

## 2022-12-20 PROCEDURE — 3700000000 HC ANESTHESIA ATTENDED CARE: Performed by: STUDENT IN AN ORGANIZED HEALTH CARE EDUCATION/TRAINING PROGRAM

## 2022-12-20 PROCEDURE — 7100000001 HC PACU RECOVERY - ADDTL 15 MIN: Performed by: STUDENT IN AN ORGANIZED HEALTH CARE EDUCATION/TRAINING PROGRAM

## 2022-12-20 PROCEDURE — 3600000003 HC SURGERY LEVEL 3 BASE: Performed by: STUDENT IN AN ORGANIZED HEALTH CARE EDUCATION/TRAINING PROGRAM

## 2022-12-20 PROCEDURE — 7100000000 HC PACU RECOVERY - FIRST 15 MIN: Performed by: STUDENT IN AN ORGANIZED HEALTH CARE EDUCATION/TRAINING PROGRAM

## 2022-12-20 PROCEDURE — 2500000003 HC RX 250 WO HCPCS: Performed by: STUDENT IN AN ORGANIZED HEALTH CARE EDUCATION/TRAINING PROGRAM

## 2022-12-20 PROCEDURE — 88305 TISSUE EXAM BY PATHOLOGIST: CPT

## 2022-12-20 PROCEDURE — 2500000003 HC RX 250 WO HCPCS: Performed by: NURSE ANESTHETIST, CERTIFIED REGISTERED

## 2022-12-20 PROCEDURE — 3700000001 HC ADD 15 MINUTES (ANESTHESIA): Performed by: STUDENT IN AN ORGANIZED HEALTH CARE EDUCATION/TRAINING PROGRAM

## 2022-12-20 PROCEDURE — 81001 URINALYSIS AUTO W/SCOPE: CPT

## 2022-12-20 PROCEDURE — 6370000000 HC RX 637 (ALT 250 FOR IP): Performed by: ANESTHESIOLOGY

## 2022-12-20 RX ORDER — DIPHENHYDRAMINE HYDROCHLORIDE 50 MG/ML
12.5 INJECTION INTRAMUSCULAR; INTRAVENOUS
Status: DISCONTINUED | OUTPATIENT
Start: 2022-12-20 | End: 2022-12-20 | Stop reason: HOSPADM

## 2022-12-20 RX ORDER — OXYCODONE HYDROCHLORIDE 5 MG/1
10 TABLET ORAL PRN
Status: DISCONTINUED | OUTPATIENT
Start: 2022-12-20 | End: 2022-12-20 | Stop reason: HOSPADM

## 2022-12-20 RX ORDER — HYDROMORPHONE HYDROCHLORIDE 1 MG/ML
0.25 INJECTION, SOLUTION INTRAMUSCULAR; INTRAVENOUS; SUBCUTANEOUS EVERY 5 MIN PRN
Status: DISCONTINUED | OUTPATIENT
Start: 2022-12-20 | End: 2022-12-20 | Stop reason: HOSPADM

## 2022-12-20 RX ORDER — SODIUM CHLORIDE 9 MG/ML
INJECTION, SOLUTION INTRAVENOUS PRN
Status: DISCONTINUED | OUTPATIENT
Start: 2022-12-20 | End: 2022-12-20 | Stop reason: HOSPADM

## 2022-12-20 RX ORDER — MIDAZOLAM HYDROCHLORIDE 2 MG/2ML
2 INJECTION, SOLUTION INTRAMUSCULAR; INTRAVENOUS
Status: COMPLETED | OUTPATIENT
Start: 2022-12-20 | End: 2022-12-20

## 2022-12-20 RX ORDER — FENTANYL CITRATE 50 UG/ML
INJECTION, SOLUTION INTRAMUSCULAR; INTRAVENOUS PRN
Status: DISCONTINUED | OUTPATIENT
Start: 2022-12-20 | End: 2022-12-20 | Stop reason: SDUPTHER

## 2022-12-20 RX ORDER — SODIUM CHLORIDE, SODIUM LACTATE, POTASSIUM CHLORIDE, CALCIUM CHLORIDE 600; 310; 30; 20 MG/100ML; MG/100ML; MG/100ML; MG/100ML
INJECTION, SOLUTION INTRAVENOUS CONTINUOUS
Status: DISCONTINUED | OUTPATIENT
Start: 2022-12-20 | End: 2022-12-20 | Stop reason: HOSPADM

## 2022-12-20 RX ORDER — ONDANSETRON 2 MG/ML
4 INJECTION INTRAMUSCULAR; INTRAVENOUS
Status: DISCONTINUED | OUTPATIENT
Start: 2022-12-20 | End: 2022-12-20 | Stop reason: HOSPADM

## 2022-12-20 RX ORDER — ROCURONIUM BROMIDE 10 MG/ML
INJECTION, SOLUTION INTRAVENOUS PRN
Status: DISCONTINUED | OUTPATIENT
Start: 2022-12-20 | End: 2022-12-20 | Stop reason: SDUPTHER

## 2022-12-20 RX ORDER — SODIUM CHLORIDE 0.9 % (FLUSH) 0.9 %
5-40 SYRINGE (ML) INJECTION EVERY 12 HOURS SCHEDULED
Status: DISCONTINUED | OUTPATIENT
Start: 2022-12-20 | End: 2022-12-20 | Stop reason: HOSPADM

## 2022-12-20 RX ORDER — SODIUM CHLORIDE 0.9 % (FLUSH) 0.9 %
5-40 SYRINGE (ML) INJECTION PRN
Status: DISCONTINUED | OUTPATIENT
Start: 2022-12-20 | End: 2022-12-20 | Stop reason: HOSPADM

## 2022-12-20 RX ORDER — HYDROMORPHONE HYDROCHLORIDE 1 MG/ML
0.5 INJECTION, SOLUTION INTRAMUSCULAR; INTRAVENOUS; SUBCUTANEOUS EVERY 10 MIN PRN
Status: DISCONTINUED | OUTPATIENT
Start: 2022-12-20 | End: 2022-12-20 | Stop reason: HOSPADM

## 2022-12-20 RX ORDER — FENTANYL CITRATE 50 UG/ML
100 INJECTION, SOLUTION INTRAMUSCULAR; INTRAVENOUS
Status: DISCONTINUED | OUTPATIENT
Start: 2022-12-20 | End: 2022-12-20 | Stop reason: HOSPADM

## 2022-12-20 RX ORDER — LIDOCAINE HYDROCHLORIDE 20 MG/ML
INJECTION, SOLUTION EPIDURAL; INFILTRATION; INTRACAUDAL; PERINEURAL PRN
Status: DISCONTINUED | OUTPATIENT
Start: 2022-12-20 | End: 2022-12-20 | Stop reason: SDUPTHER

## 2022-12-20 RX ORDER — ACETAMINOPHEN 500 MG
1000 TABLET ORAL ONCE
Status: COMPLETED | OUTPATIENT
Start: 2022-12-20 | End: 2022-12-20

## 2022-12-20 RX ORDER — PROPOFOL 10 MG/ML
INJECTION, EMULSION INTRAVENOUS PRN
Status: DISCONTINUED | OUTPATIENT
Start: 2022-12-20 | End: 2022-12-20 | Stop reason: SDUPTHER

## 2022-12-20 RX ORDER — OXYCODONE HYDROCHLORIDE 5 MG/1
5 TABLET ORAL PRN
Status: DISCONTINUED | OUTPATIENT
Start: 2022-12-20 | End: 2022-12-20 | Stop reason: HOSPADM

## 2022-12-20 RX ORDER — KETOROLAC TROMETHAMINE 30 MG/ML
INJECTION, SOLUTION INTRAMUSCULAR; INTRAVENOUS PRN
Status: DISCONTINUED | OUTPATIENT
Start: 2022-12-20 | End: 2022-12-20 | Stop reason: SDUPTHER

## 2022-12-20 RX ADMIN — FENTANYL CITRATE 50 MCG: 50 INJECTION, SOLUTION INTRAMUSCULAR; INTRAVENOUS at 10:29

## 2022-12-20 RX ADMIN — ACETAMINOPHEN 1000 MG: 500 TABLET, FILM COATED ORAL at 08:51

## 2022-12-20 RX ADMIN — MIDAZOLAM 2 MG: 1 INJECTION INTRAMUSCULAR; INTRAVENOUS at 09:31

## 2022-12-20 RX ADMIN — PROPOFOL 60 MG: 10 INJECTION, EMULSION INTRAVENOUS at 10:17

## 2022-12-20 RX ADMIN — PROPOFOL 200 MG: 10 INJECTION, EMULSION INTRAVENOUS at 10:16

## 2022-12-20 RX ADMIN — DOXYCYCLINE 100 MG: 100 INJECTION, POWDER, LYOPHILIZED, FOR SOLUTION INTRAVENOUS at 10:23

## 2022-12-20 RX ADMIN — KETOROLAC TROMETHAMINE 30 MG: 30 INJECTION, SOLUTION INTRAMUSCULAR; INTRAVENOUS at 10:40

## 2022-12-20 RX ADMIN — ROCURONIUM BROMIDE 5 MG: 50 INJECTION, SOLUTION INTRAVENOUS at 10:16

## 2022-12-20 RX ADMIN — SODIUM CHLORIDE, POTASSIUM CHLORIDE, SODIUM LACTATE AND CALCIUM CHLORIDE: 600; 310; 30; 20 INJECTION, SOLUTION INTRAVENOUS at 09:25

## 2022-12-20 RX ADMIN — LIDOCAINE HYDROCHLORIDE 100 MG: 20 INJECTION, SOLUTION EPIDURAL; INFILTRATION; INTRACAUDAL; PERINEURAL at 10:16

## 2022-12-20 RX ADMIN — FENTANYL CITRATE 50 MCG: 50 INJECTION, SOLUTION INTRAMUSCULAR; INTRAVENOUS at 10:16

## 2022-12-20 ASSESSMENT — PAIN DESCRIPTION - LOCATION: LOCATION: ABDOMEN

## 2022-12-20 ASSESSMENT — PAIN SCALES - GENERAL
PAINLEVEL_OUTOF10: 0
PAINLEVEL_OUTOF10: 0
PAINLEVEL_OUTOF10: 4
PAINLEVEL_OUTOF10: 0

## 2022-12-20 ASSESSMENT — PAIN - FUNCTIONAL ASSESSMENT: PAIN_FUNCTIONAL_ASSESSMENT: 0-10

## 2022-12-20 NOTE — ANESTHESIA POSTPROCEDURE EVALUATION
Department of Anesthesiology  Postprocedure Note    Patient: Modesta Alfredo  MRN: [id number]  YOB: 1990  Date of evaluation: 12/20/2022      Procedure Summary     Date: 12/20/22 Room / Location: [facility] MAIN OR 03 / [facility] MAIN OR    Anesthesia Start: 1009 Anesthesia Stop: 6094    Procedure: DILATATION AND CURETTAGE SUCTION (Vagina ) Diagnosis:       Incomplete miscarriage      (Incomplete miscarriage [O03.4])    Surgeons: Rylie Calabrese MD Responsible Provider: Jan Manley MD    Anesthesia Type: general ASA Status: 3          Anesthesia Type: No value filed.     Roque Phase I: Roque Score: 8    Roque Phase II: Roque Score: 10      Anesthesia Post Evaluation    Patient location during evaluation: PACU  Patient participation: complete - patient participated  Level of consciousness: awake and alert  Airway patency: patent  Nausea & Vomiting: no nausea  Cardiovascular status: hemodynamically stable  Respiratory status: acceptable  Hydration status: euvolemic

## 2022-12-20 NOTE — ANESTHESIA PRE PROCEDURE
Department of Anesthesiology  Preprocedure Note       Name:  Kendall Grossman   Age:  27 y.o.  :  1990                                          MRN:  [id number]         Date:  2022      Surgeon: Marcel Fregoso):  Scott Gordon MD    Procedure: Procedure(s):  DILATATION AND CURETTAGE SUCTION    Medications prior to admission:   Prior to Admission medications    Medication Sig Start Date End Date Taking? Authorizing Provider   acetaminophen (TYLENOL) 500 MG tablet Take 500 mg by mouth every 6 hours as needed    Ar Automatic Reconciliation   calcium carbonate (OS-RAHUL) 1250 (500 Ca) MG chewable tablet Take 1 tablet by mouth daily as needed    Ar Automatic Reconciliation   Docosahexaenoic Acid 200 MG CAPS The details of the medication are not available because there are pending changes by a home health clinician. 22   Ar Automatic Reconciliation   furosemide (LASIX) 20 MG tablet Take 20 mg by mouth daily  Patient not taking: Reported on 2022    Ar Automatic Reconciliation   omeprazole (PRILOSEC) 40 MG delayed release capsule Take 40 mg by mouth daily 11/3/21   Ar Automatic Reconciliation   oxyCODONE (ROXICODONE) 5 MG immediate release tablet Take 5 mg by mouth every 4 hours as needed.     Ar Automatic Reconciliation       Current medications:    Current Facility-Administered Medications   Medication Dose Route Frequency Provider Last Rate Last Admin    fentaNYL (SUBLIMAZE) injection 100 mcg  100 mcg IntraVENous Once PRN Luis Fernando Murcia MD        lactated ringers infusion   IntraVENous Continuous Chidi Marquis MD        sodium chloride flush 0.9 % injection 5-40 mL  5-40 mL IntraVENous 2 times per day Luis Fernando Murcia MD        sodium chloride flush 0.9 % injection 5-40 mL  5-40 mL IntraVENous PRN Luis Fernando Murcia MD        midazolam PF (VERSED) injection 2 mg  2 mg IntraVENous Once PRN Luis Fernando Murcia MD        lactated ringers infusion   IntraVENous Continuous Nohemy Locus MD Angela        sodium chloride flush 0.9 % injection 5-40 mL  5-40 mL IntraVENous 2 times per day Son Traore MD        sodium chloride flush 0.9 % injection 5-40 mL  5-40 mL IntraVENous PRN Son Traore MD        0.9 % sodium chloride infusion   IntraVENous PRN Son Traore MD        doxycycline (VIBRAMYCIN) 100 mg in sodium chloride 0.9 % 100 mL IVPB  100 mg IntraVENous Q1H Son Traore MD           Allergies:  No Known Allergies    Problem List:    Patient Active Problem List   Diagnosis Code    Obesity affecting pregnancy in third trimester O99.213    H/O  section complicating pregnancy C45.723    High-risk pregnancy in third trimester O09.93    COVID-19 virus infection U07.1    Nonhealing surgical wound T81.89XA    COVID U07.1    Acute respiratory failure with hypoxia ([address] J96.01    Postoperative wound cellulitis T81.49XA    Noncompliant pregnant patient in third trimester O09.893, Z91.199    40 weeks gestation of pregnancy Z3A.37    Chronic hypertension affecting pregnancy O10.919    Postpartum  wound disruption O90.0    Severe obesity (BMI >= 40) ([facility]) E66.01    Polycystic ovary affecting pregnancy, antepartum O34.80, E28.2    Wound hematoma following  section, postpartum O90.2    Hydronephrosis of fetus in james pregnancy, antepartum O35. EXX0    Insulin controlled gestational diabetes mellitus (GDM) in third trimester O24.414    Incomplete  without complications P55.9       Past Medical History:        Diagnosis Date    44 weeks gestation of pregnancy 12/15/2019    Delivery with history of  2021    Diet controlled gestational diabetes mellitus (GDM) in second trimester 2021    Disorder of pregnancy 2021    Encounter for planned induction of labor 12/15/2019    Gestational hypertension     History of elective  8/1/2016    x2    Hydronephrosis of fetus in james pregnancy, antepartum 10/7/2021    Hypertension     PCOS (polycystic ovarian syndrome)        Past Surgical History:        Procedure Laterality Date    OTHER SURGICAL HISTORY  4/2014    EAB x2    TONSILLECTOMY  12/2010    WISDOM TOOTH EXTRACTION         Social History:    Social History     Tobacco Use    Smoking status: Never    Smokeless tobacco: Never   Substance Use Topics    Alcohol use:  No                                Counseling given: Not Answered      Vital Signs (Current):   Vitals:    12/20/22 0830   BP: (!) 142/91   Pulse: 81   Temp: 98.4 °F (36.9 °C)   SpO2: 98%                                              BP Readings from Last 3 Encounters:   12/20/22 (!) 142/91   02/02/22 134/78   01/28/22 138/80       NPO Status: Time of last liquid consumption: 2100                        Time of last solid consumption: 2100                        Date of last liquid consumption: 12/19/22                        Date of last solid food consumption: 12/19/22    BMI:   Wt Readings from Last 3 Encounters:   08/24/21 (!) 318 lb (144.2 kg)     There is no height or weight on file to calculate BMI.    CBC:   Lab Results   Component Value Date/Time    WBC 8.1 01/04/2022 08:40 AM    RBC 2.66 01/04/2022 08:40 AM    HGB 8.0 01/04/2022 08:40 AM    HCT 26.0 01/04/2022 08:40 AM    MCV 97.7 01/04/2022 08:40 AM    RDW 16.0 01/04/2022 08:40 AM     01/04/2022 08:40 AM       CMP:   Lab Results   Component Value Date/Time     01/04/2022 08:40 AM    K 3.7 01/04/2022 08:40 AM     01/04/2022 08:40 AM    CO2 23 01/04/2022 08:40 AM    BUN 9 01/04/2022 08:40 AM    CREATININE 0.82 01/04/2022 08:40 AM    GFRAA >60 01/04/2022 08:40 AM    AGRATIO 0.6 01/03/2022 09:16 AM    GLUCOSE 72 01/04/2022 08:40 AM    PROT 6.5 01/03/2022 09:16 AM    CALCIUM 8.3 01/04/2022 08:40 AM    BILITOT 0.4 01/03/2022 09:16 AM    ALKPHOS 75 01/03/2022 09:16 AM    AST 14 01/03/2022 09:16 AM    ALT 21 01/03/2022 09:16 AM       POC Tests: No results for input(s): POCGLU, POCNA, POCK, POCCL, POCBUN, POCHEMO, POCHCT in the last 72 hours. Coags:   Lab Results   Component Value Date/Time    PROTIME 12.6 01/01/2022 10:15 PM    INR 0.9 01/01/2022 10:15 PM    APTT 27.3 01/01/2022 10:15 PM       HCG (If Applicable): No results found for: PREGTESTUR, PREGSERUM, HCG, HCGQUANT     ABGs: No results found for: PHART, PO2ART, WHD8NRP, SUZ4RIL, BEART, T3BCKGQT     Type & Screen (If Applicable):  No results found for: LABABO, LABRH    Drug/Infectious Status (If Applicable):  No results found for: HIV, HEPCAB    COVID-19 Screening (If Applicable):   Lab Results   Component Value Date/Time    COVID19 Detected 01/02/2022 11:56 AM           Anesthesia Evaluation  Patient summary reviewed and Nursing notes reviewed   history of anesthetic complications: PONV. Airway: Mallampati: II  TM distance: >3 FB   Neck ROM: full  Mouth opening: > = 3 FB   Dental:      Comment: Upper and lower permanent retainers    Pulmonary:Negative Pulmonary ROS and normal exam                               Cardiovascular:  Exercise tolerance: good (>4 METS),       Hypertension: gest.      Rhythm: regular  Rate: normal                    Neuro/Psych:   Negative Neuro/Psych ROS              GI/Hepatic/Renal:   (+) GERD: well controlled, morbid obesity          Endo/Other:        Diabetes: gest.                ROS comment: hgb 11.7 in office this week  CBC and type and screen pending today Abdominal:             Vascular: Other Findings:           Anesthesia Plan      general     ASA 3     (OETT)  Induction: intravenous. Anesthetic plan and risks discussed with patient. Use of blood products discussed with patient whom.                      Martha Garcia MD   12/20/2022

## 2022-12-20 NOTE — OP NOTE
Preoperative diagnosis: Incomplete  without excessive hemorrhage    Postoperative diagnosis: same as above    Procedure(s): suction dilation and curettage    Findings: EUA mobile anteverted uterus approx 6wk sized, poorly mobile. Cervix closed, slow dark blood trickle per os. Vagina otherwise normal.  Adnexae not palpable. Surgeon: Marciano Omalley MD     Assistants: none    Anesthesia: General LMA anesthesia    Complications: none    INDICATIONS: The patient is a 30yo female with retained products of conception and ongoing heavy bleeding despite attempted outpatient medical management of incomplete miscarriage. These findings were discussed with the patient and options including surgical management via dilation and curettage versus management with TXA versus expected management were discussed with the patient. After discussion of these options, the patient opted for a suction, dilation, and curettage. The patient was described to the patient in detail including risks of infection, bleeding, injury to surrounding organs including risk of perforation. Informed consent was obtained prior to proceeding with the procedure. PROCEDURE NOTE: The patient was taken to the operating room where general LMA anesthesia was administered without difficulty. The patient was prepped and draped in usual sterile fashion in lithotomy position. A weighted speculum was placed. The anterior lip of the cervix was grasped with a single tooth tenaculum. At this time, the uterus sounded to 9cm,cervix serially dilated with rufino dilators to a 25french and a 9-mm curved suction curette (smaller size not available) was advanced into the uterine cavity without difficulty and was used to suction contents of the uterus. Following removal of the products of conception in 1 pass, a second pass was made with no additional tissue or blood removed. All instruments were removed.  Hemostasis was visualized per os, although the tenaculum site required a single silver nitrate for hemostasis. The patient was stable at the completion of the procedure. Sponge, lap, and instrument counts were correct.

## 2024-06-20 ENCOUNTER — OFFICE VISIT (OUTPATIENT)
Dept: PRIMARY CARE CLINIC | Facility: CLINIC | Age: 34
End: 2024-06-20
Payer: COMMERCIAL

## 2024-06-20 VITALS
OXYGEN SATURATION: 96 % | HEART RATE: 85 BPM | WEIGHT: 293 LBS | BODY MASS INDEX: 43.4 KG/M2 | SYSTOLIC BLOOD PRESSURE: 138 MMHG | DIASTOLIC BLOOD PRESSURE: 88 MMHG | RESPIRATION RATE: 16 BRPM | HEIGHT: 69 IN | TEMPERATURE: 97.7 F

## 2024-06-20 DIAGNOSIS — R42 VERTIGO: ICD-10-CM

## 2024-06-20 DIAGNOSIS — E66.9 OBESITY, UNSPECIFIED OBESITY SEVERITY, UNSPECIFIED OBESITY TYPE: ICD-10-CM

## 2024-06-20 DIAGNOSIS — I99.8 FLUCTUATING BLOOD PRESSURE: ICD-10-CM

## 2024-06-20 DIAGNOSIS — E28.2 PCOD (POLYCYSTIC OVARIAN DISEASE): Primary | ICD-10-CM

## 2024-06-20 DIAGNOSIS — Z71.3 DIETARY COUNSELING: ICD-10-CM

## 2024-06-20 DIAGNOSIS — Z86.32 HISTORY OF GESTATIONAL DIABETES: ICD-10-CM

## 2024-06-20 DIAGNOSIS — Z71.82 EXERCISE COUNSELING: ICD-10-CM

## 2024-06-20 PROCEDURE — 99204 OFFICE O/P NEW MOD 45 MIN: CPT | Performed by: FAMILY MEDICINE

## 2024-06-20 RX ORDER — METFORMIN HYDROCHLORIDE 500 MG/1
500 TABLET, EXTENDED RELEASE ORAL 2 TIMES DAILY
Qty: 180 TABLET | Refills: 0 | Status: SHIPPED | OUTPATIENT
Start: 2024-06-20

## 2024-06-20 RX ORDER — MECLIZINE HYDROCHLORIDE 25 MG/1
25 TABLET ORAL 3 TIMES DAILY PRN
Qty: 30 TABLET | Refills: 0 | Status: SHIPPED | OUTPATIENT
Start: 2024-06-20 | End: 2024-06-30

## 2024-06-20 SDOH — ECONOMIC STABILITY: FOOD INSECURITY: WITHIN THE PAST 12 MONTHS, THE FOOD YOU BOUGHT JUST DIDN'T LAST AND YOU DIDN'T HAVE MONEY TO GET MORE.: NEVER TRUE

## 2024-06-20 SDOH — ECONOMIC STABILITY: FOOD INSECURITY: WITHIN THE PAST 12 MONTHS, YOU WORRIED THAT YOUR FOOD WOULD RUN OUT BEFORE YOU GOT MONEY TO BUY MORE.: NEVER TRUE

## 2024-06-20 SDOH — ECONOMIC STABILITY: HOUSING INSECURITY
IN THE LAST 12 MONTHS, WAS THERE A TIME WHEN YOU DID NOT HAVE A STEADY PLACE TO SLEEP OR SLEPT IN A SHELTER (INCLUDING NOW)?: NO

## 2024-06-20 SDOH — HEALTH STABILITY: PHYSICAL HEALTH: ON AVERAGE, HOW MANY DAYS PER WEEK DO YOU ENGAGE IN MODERATE TO STRENUOUS EXERCISE (LIKE A BRISK WALK)?: 2 DAYS

## 2024-06-20 SDOH — HEALTH STABILITY: PHYSICAL HEALTH: ON AVERAGE, HOW MANY MINUTES DO YOU ENGAGE IN EXERCISE AT THIS LEVEL?: 40 MIN

## 2024-06-20 SDOH — ECONOMIC STABILITY: INCOME INSECURITY: HOW HARD IS IT FOR YOU TO PAY FOR THE VERY BASICS LIKE FOOD, HOUSING, MEDICAL CARE, AND HEATING?: NOT HARD AT ALL

## 2024-06-20 ASSESSMENT — PATIENT HEALTH QUESTIONNAIRE - PHQ9
2. FEELING DOWN, DEPRESSED OR HOPELESS: NOT AT ALL
SUM OF ALL RESPONSES TO PHQ QUESTIONS 1-9: 0
1. LITTLE INTEREST OR PLEASURE IN DOING THINGS: NOT AT ALL
SUM OF ALL RESPONSES TO PHQ QUESTIONS 1-9: 0
SUM OF ALL RESPONSES TO PHQ9 QUESTIONS 1 & 2: 0
SUM OF ALL RESPONSES TO PHQ QUESTIONS 1-9: 0
SUM OF ALL RESPONSES TO PHQ QUESTIONS 1-9: 0

## 2024-06-20 ASSESSMENT — ENCOUNTER SYMPTOMS
GASTROINTESTINAL NEGATIVE: 1
RESPIRATORY NEGATIVE: 1
ALLERGIC/IMMUNOLOGIC NEGATIVE: 1
EYES NEGATIVE: 1

## 2024-06-20 NOTE — PROGRESS NOTES
[facility]  [address]  [place] [address]  Office : [phone]  Fax : [phone]      Subjective:  The patient is a 34 y.o. female  who presents for f/u on multiple chronic medical conditions-good compliance with medications--pt here to get routeine labs and need refill on meds.no cardiopulmonary symptoms  PCOD--on metformin on and off  Fluctuating BP-for few months-interested in workup  Obesity-interested in weight loss  Hx of BP and DM in pregnancy--interested in recheck    Patient Active Problem List   Diagnosis    Obesity affecting pregnancy in third trimester    H/O  section complicating pregnancy    High-risk pregnancy in third trimester    COVID-19 virus infection    Nonhealing surgical wound    COVID    Acute respiratory failure with hypoxia (HCC)    Postoperative wound cellulitis    Noncompliant pregnant patient in third trimester    37 weeks gestation of pregnancy    Chronic hypertension affecting pregnancy    Postpartum  wound disruption    Severe obesity (BMI >= 40) (HCC)    Polycystic ovary affecting pregnancy, antepartum    Wound hematoma following  section, postpartum    Hydronephrosis of fetus in james pregnancy, antepartum    Insulin controlled gestational diabetes mellitus (GDM) in third trimester    Incomplete  without complications    PCOD (polycystic ovarian disease)    Vertigo    Fluctuating blood pressure    History of gestational diabetes    Obesity, unspecified obesity severity, unspecified obesity type    Dietary counseling    Exercise counseling       Past Medical History:   Diagnosis Date    39 weeks gestation of pregnancy 12/15/2019    Delivery with history of  2021    Diet controlled gestational diabetes mellitus (GDM) in second trimester 2021    Disorder of pregnancy 2021    Encounter for planned induction of labor 12/15/2019    Gestational hypertension     History of elective

## 2024-07-09 ENCOUNTER — HOSPITAL ENCOUNTER (OUTPATIENT)
Dept: LAB | Age: 34
Discharge: HOME | End: 2024-07-12

## 2024-07-09 ENCOUNTER — HOSPITAL ENCOUNTER (OUTPATIENT)
Dept: ULTRASOUND IMAGING | Age: 34
Discharge: HOME | End: 2024-07-12
Attending: FAMILY MEDICINE
Payer: COMMERCIAL

## 2024-07-09 DIAGNOSIS — I99.8 FLUCTUATING BLOOD PRESSURE: ICD-10-CM

## 2024-07-09 DIAGNOSIS — Z86.32 HISTORY OF GESTATIONAL DIABETES: ICD-10-CM

## 2024-07-09 DIAGNOSIS — E28.2 PCOD (POLYCYSTIC OVARIAN DISEASE): ICD-10-CM

## 2024-07-09 DIAGNOSIS — R42 VERTIGO: ICD-10-CM

## 2024-07-09 DIAGNOSIS — E66.9 OBESITY, UNSPECIFIED OBESITY SEVERITY, UNSPECIFIED OBESITY TYPE: ICD-10-CM

## 2024-07-09 DIAGNOSIS — Z71.3 DIETARY COUNSELING: ICD-10-CM

## 2024-07-09 DIAGNOSIS — Z71.82 EXERCISE COUNSELING: ICD-10-CM

## 2024-07-09 LAB
25(OH)D3 SERPL-MCNC: 11.4 NG/ML (ref 30–100)
ALBUMIN SERPL-MCNC: 4.1 G/DL (ref 3.5–5)
ALBUMIN/GLOB SERPL: 1.3 (ref 1–1.9)
ALP SERPL-CCNC: 34 U/L (ref 35–104)
ALT SERPL-CCNC: 14 U/L (ref 12–65)
ANION GAP SERPL CALC-SCNC: 12 MMOL/L (ref 9–18)
AST SERPL-CCNC: 19 U/L (ref 15–37)
BASOPHILS # BLD: 0 K/UL (ref 0–0.2)
BASOPHILS NFR BLD: 1 % (ref 0–2)
BILIRUB SERPL-MCNC: 0.4 MG/DL (ref 0–1.2)
BUN SERPL-MCNC: 10 MG/DL (ref 6–23)
CALCIUM SERPL-MCNC: 9.1 MG/DL (ref 8.8–10.2)
CHLORIDE SERPL-SCNC: 105 MMOL/L (ref 98–107)
CHOLEST SERPL-MCNC: 184 MG/DL (ref 0–200)
CO2 SERPL-SCNC: 22 MMOL/L (ref 20–28)
CREAT SERPL-MCNC: 0.93 MG/DL (ref 0.6–1.1)
DIFFERENTIAL METHOD BLD: NORMAL
EOSINOPHIL # BLD: 0.2 K/UL (ref 0–0.8)
EOSINOPHIL NFR BLD: 4 % (ref 0.5–7.8)
ERYTHROCYTE [DISTWIDTH] IN BLOOD BY AUTOMATED COUNT: 13 % (ref 11.9–14.6)
EST. AVERAGE GLUCOSE BLD GHB EST-MCNC: 108 MG/DL
FSH SERPL-ACNC: 4 MIU/ML
GLOBULIN SER CALC-MCNC: 3.1 G/DL (ref 2.3–3.5)
GLUCOSE SERPL-MCNC: 100 MG/DL (ref 70–99)
HBA1C MFR BLD: 5.4 % (ref 0–5.6)
HCT VFR BLD AUTO: 42.3 % (ref 35.8–46.3)
HDLC SERPL-MCNC: 42 MG/DL (ref 40–60)
HDLC SERPL: 4.4 (ref 0–5)
HGB BLD-MCNC: 13.6 G/DL (ref 11.7–15.4)
IMM GRANULOCYTES # BLD AUTO: 0 K/UL (ref 0–0.5)
IMM GRANULOCYTES NFR BLD AUTO: 0 % (ref 0–5)
LDLC SERPL CALC-MCNC: 129 MG/DL (ref 0–100)
LH SERPL-ACNC: 2.5 MIU/ML
LYMPHOCYTES # BLD: 1.9 K/UL (ref 0.5–4.6)
LYMPHOCYTES NFR BLD: 34 % (ref 13–44)
MCH RBC QN AUTO: 31.9 PG (ref 26.1–32.9)
MCHC RBC AUTO-ENTMCNC: 32.2 G/DL (ref 31.4–35)
MCV RBC AUTO: 99.1 FL (ref 82–102)
MONOCYTES # BLD: 0.4 K/UL (ref 0.1–1.3)
MONOCYTES NFR BLD: 8 % (ref 4–12)
NEUTS SEG # BLD: 3 K/UL (ref 1.7–8.2)
NEUTS SEG NFR BLD: 53 % (ref 43–78)
NRBC # BLD: 0 K/UL (ref 0–0.2)
PLATELET # BLD AUTO: 169 K/UL (ref 150–450)
PMV BLD AUTO: 11.5 FL (ref 9.4–12.3)
POTASSIUM SERPL-SCNC: 4.2 MMOL/L (ref 3.5–5.1)
PROT SERPL-MCNC: 7.1 G/DL (ref 6.3–8.2)
RBC # BLD AUTO: 4.27 M/UL (ref 4.05–5.2)
SODIUM SERPL-SCNC: 139 MMOL/L (ref 136–145)
TRIGL SERPL-MCNC: 65 MG/DL (ref 0–150)
TSH W FREE THYROID IF ABNORMAL: 1.52 UIU/ML (ref 0.27–4.2)
VLDLC SERPL CALC-MCNC: 13 MG/DL (ref 6–23)
WBC # BLD AUTO: 5.6 K/UL (ref 4.3–11.1)

## 2024-07-09 PROCEDURE — 93975 VASCULAR STUDY: CPT | Performed by: RADIOLOGY

## 2024-07-09 PROCEDURE — 93975 VASCULAR STUDY: CPT

## 2024-07-11 LAB
ALDOST SERPL-MCNC: 8.9 NG/DL (ref 0–30)
ALDOST/RENIN PLAS-RTO: 27.5 (ref 0–30)
RENIN PLAS-CCNC: 0.32 NG/ML/HR (ref 0.17–5.38)

## 2024-07-12 LAB
DOPAMINE SERPL-MCNC: <30 PG/ML (ref 0–48)
EPINEPH PLAS-MCNC: <15 PG/ML (ref 0–62)
NOREPINEPH PLAS-MCNC: 482 PG/ML (ref 0–874)

## 2024-07-14 ENCOUNTER — PATIENT MESSAGE (OUTPATIENT)
Dept: PRIMARY CARE CLINIC | Facility: CLINIC | Age: 34
End: 2024-07-14

## 2024-07-15 NOTE — TELEPHONE ENCOUNTER
From: Therese Gordon  To: Dr. Cora Veronica  Sent: 7/14/2024 7:57 AM EDT  Subject: Appointment    I have an appointment with  on 07/17/24. Is it possible to make it virtual? I can also change the appointment to virtual on another day as well. It’s just a follow-up.

## 2024-07-17 ENCOUNTER — TELEMEDICINE (OUTPATIENT)
Dept: PRIMARY CARE CLINIC | Facility: CLINIC | Age: 34
End: 2024-07-17
Payer: COMMERCIAL

## 2024-07-17 DIAGNOSIS — E66.9 OBESITY, UNSPECIFIED OBESITY SEVERITY, UNSPECIFIED OBESITY TYPE: ICD-10-CM

## 2024-07-17 DIAGNOSIS — E28.2 PCOD (POLYCYSTIC OVARIAN DISEASE): ICD-10-CM

## 2024-07-17 DIAGNOSIS — E78.49 OTHER HYPERLIPIDEMIA: ICD-10-CM

## 2024-07-17 DIAGNOSIS — Z71.3 DIETARY COUNSELING: ICD-10-CM

## 2024-07-17 DIAGNOSIS — I99.8 FLUCTUATING BLOOD PRESSURE: ICD-10-CM

## 2024-07-17 DIAGNOSIS — E55.9 VITAMIN D DEFICIENCY: ICD-10-CM

## 2024-07-17 DIAGNOSIS — E28.2 PCOD (POLYCYSTIC OVARIAN DISEASE): Primary | ICD-10-CM

## 2024-07-17 DIAGNOSIS — Z71.82 EXERCISE COUNSELING: ICD-10-CM

## 2024-07-17 PROCEDURE — 99214 OFFICE O/P EST MOD 30 MIN: CPT | Performed by: FAMILY MEDICINE

## 2024-07-17 RX ORDER — ERGOCALCIFEROL 1.25 MG/1
50000 CAPSULE ORAL WEEKLY
Qty: 12 CAPSULE | Refills: 1 | Status: SHIPPED | OUTPATIENT
Start: 2024-07-17

## 2024-07-17 ASSESSMENT — ENCOUNTER SYMPTOMS
ALLERGIC/IMMUNOLOGIC NEGATIVE: 1
EYES NEGATIVE: 1
RESPIRATORY NEGATIVE: 1
GASTROINTESTINAL NEGATIVE: 1

## 2024-07-17 NOTE — PROGRESS NOTES
detail.  Medication risks, benefits, costs, interactions, and alternatives were discussed as indicated.  I advised her to contact the office if her condition worsens, changes or fails to improve as anticipated. She expressed understanding with the diagnosis(es) and plan.    Therese Gordon, was evaluated through a synchronous (real-time) audio-video encounter. The patient (or guardian if applicable) is aware that this is a billable service, which includes applicable co-pays. This Virtual Visit was conducted with patient's (and/or legal guardian's) consent. Patient identification was verified, and a caregiver was present when appropriate.   The patient was located at Home: [address] [place] [id number]  Provider was located at Facility (Appt Dept): [address] [place] [phone]  Confirm you are appropriately licensed, registered, or certified to deliver care in the state where the patient is located as indicated above. If you are not or unsure, please re-schedule the visit: Yes, I confirm.       Return in about 3 months (around 10/17/2024), or obesity.     Cora Veronica MD

## 2024-10-18 ENCOUNTER — PATIENT MESSAGE (OUTPATIENT)
Dept: PRIMARY CARE CLINIC | Facility: CLINIC | Age: 34
End: 2024-10-18

## 2024-10-18 ENCOUNTER — OFFICE VISIT (OUTPATIENT)
Dept: PRIMARY CARE CLINIC | Facility: CLINIC | Age: 34
End: 2024-10-18
Payer: COMMERCIAL

## 2024-10-18 VITALS
SYSTOLIC BLOOD PRESSURE: 140 MMHG | DIASTOLIC BLOOD PRESSURE: 84 MMHG | TEMPERATURE: 98 F | BODY MASS INDEX: 43.4 KG/M2 | HEIGHT: 69 IN | WEIGHT: 293 LBS | OXYGEN SATURATION: 97 % | HEART RATE: 76 BPM

## 2024-10-18 DIAGNOSIS — Z86.32 HISTORY OF GESTATIONAL DIABETES: ICD-10-CM

## 2024-10-18 DIAGNOSIS — Z71.3 DIETARY COUNSELING: ICD-10-CM

## 2024-10-18 DIAGNOSIS — E28.2 PCOD (POLYCYSTIC OVARIAN DISEASE): Primary | ICD-10-CM

## 2024-10-18 DIAGNOSIS — E78.49 OTHER HYPERLIPIDEMIA: ICD-10-CM

## 2024-10-18 DIAGNOSIS — E66.9 OBESITY, UNSPECIFIED OBESITY SEVERITY, UNSPECIFIED OBESITY TYPE: ICD-10-CM

## 2024-10-18 DIAGNOSIS — E55.9 VITAMIN D DEFICIENCY: ICD-10-CM

## 2024-10-18 DIAGNOSIS — I99.8 FLUCTUATING BLOOD PRESSURE: ICD-10-CM

## 2024-10-18 PROBLEM — I10 PRIMARY HYPERTENSION: Status: ACTIVE | Noted: 2024-10-18

## 2024-10-18 PROBLEM — J96.01 ACUTE RESPIRATORY FAILURE WITH HYPOXIA: Status: RESOLVED | Noted: 2022-01-03 | Resolved: 2024-10-18

## 2024-10-18 PROCEDURE — 3077F SYST BP >= 140 MM HG: CPT | Performed by: FAMILY MEDICINE

## 2024-10-18 PROCEDURE — 99214 OFFICE O/P EST MOD 30 MIN: CPT | Performed by: FAMILY MEDICINE

## 2024-10-18 PROCEDURE — 3079F DIAST BP 80-89 MM HG: CPT | Performed by: FAMILY MEDICINE

## 2024-10-18 RX ORDER — ERGOCALCIFEROL 1.25 MG/1
50000 CAPSULE, LIQUID FILLED ORAL WEEKLY
Qty: 12 CAPSULE | Refills: 0 | Status: SHIPPED | OUTPATIENT
Start: 2024-10-18

## 2024-10-18 RX ORDER — METFORMIN HCL 500 MG
500 TABLET, EXTENDED RELEASE 24 HR ORAL 2 TIMES DAILY
Qty: 180 TABLET | Refills: 0 | Status: SHIPPED | OUTPATIENT
Start: 2024-10-18

## 2024-10-18 ASSESSMENT — ENCOUNTER SYMPTOMS
ALLERGIC/IMMUNOLOGIC NEGATIVE: 1
RESPIRATORY NEGATIVE: 1
GASTROINTESTINAL NEGATIVE: 1
EYES NEGATIVE: 1

## 2024-10-18 ASSESSMENT — PATIENT HEALTH QUESTIONNAIRE - PHQ9
SUM OF ALL RESPONSES TO PHQ9 QUESTIONS 1 & 2: 0
1. LITTLE INTEREST OR PLEASURE IN DOING THINGS: NOT AT ALL
SUM OF ALL RESPONSES TO PHQ QUESTIONS 1-9: 0
2. FEELING DOWN, DEPRESSED OR HOPELESS: NOT AT ALL

## 2024-10-18 NOTE — PROGRESS NOTES
calorie diet  (may include anyor all of the following: reviewing test results, prognosis, importance of compliance, education about disease process, benefits of medications, instructions for management of acute flare-ups, and follow up plans).        Return in about 3 months (around 1/18/2025).     Cora Veronica MD

## 2024-10-24 ENCOUNTER — PATIENT MESSAGE (OUTPATIENT)
Dept: PRIMARY CARE CLINIC | Facility: CLINIC | Age: 34
End: 2024-10-24

## 2024-11-18 DIAGNOSIS — I99.8 FLUCTUATING BLOOD PRESSURE: Primary | ICD-10-CM

## 2024-11-18 DIAGNOSIS — E28.2 PCOD (POLYCYSTIC OVARIAN DISEASE): ICD-10-CM

## 2024-11-18 DIAGNOSIS — Z86.32 HISTORY OF GESTATIONAL DIABETES: ICD-10-CM

## 2024-12-23 ENCOUNTER — TELEMEDICINE (OUTPATIENT)
Dept: PRIMARY CARE CLINIC | Facility: CLINIC | Age: 34
End: 2024-12-23
Payer: COMMERCIAL

## 2024-12-23 DIAGNOSIS — E28.2 PCOD (POLYCYSTIC OVARIAN DISEASE): Primary | ICD-10-CM

## 2024-12-23 DIAGNOSIS — Z86.32 HISTORY OF GESTATIONAL DIABETES: ICD-10-CM

## 2024-12-23 DIAGNOSIS — Z97.5 IUD (INTRAUTERINE DEVICE) IN PLACE: ICD-10-CM

## 2024-12-23 DIAGNOSIS — Z71.82 EXERCISE COUNSELING: ICD-10-CM

## 2024-12-23 DIAGNOSIS — E66.9 OBESITY, UNSPECIFIED OBESITY SEVERITY, UNSPECIFIED OBESITY TYPE: ICD-10-CM

## 2024-12-23 DIAGNOSIS — E55.9 VITAMIN D DEFICIENCY: ICD-10-CM

## 2024-12-23 DIAGNOSIS — E78.49 OTHER HYPERLIPIDEMIA: ICD-10-CM

## 2024-12-23 DIAGNOSIS — Z71.3 DIETARY COUNSELING: ICD-10-CM

## 2024-12-23 PROCEDURE — 99214 OFFICE O/P EST MOD 30 MIN: CPT | Performed by: FAMILY MEDICINE

## 2024-12-23 RX ORDER — ERGOCALCIFEROL 1.25 MG/1
50000 CAPSULE, LIQUID FILLED ORAL WEEKLY
Qty: 12 CAPSULE | Refills: 0 | Status: SHIPPED | OUTPATIENT
Start: 2024-12-23

## 2024-12-29 PROBLEM — Z97.5 IUD (INTRAUTERINE DEVICE) IN PLACE: Status: ACTIVE | Noted: 2024-12-29

## 2025-02-14 DIAGNOSIS — E28.2 PCOD (POLYCYSTIC OVARIAN DISEASE): ICD-10-CM

## 2025-02-14 RX ORDER — METFORMIN HYDROCHLORIDE 500 MG/1
500 TABLET, EXTENDED RELEASE ORAL 2 TIMES DAILY
Qty: 180 TABLET | Refills: 1 | OUTPATIENT
Start: 2025-02-14

## 2025-02-18 SDOH — ECONOMIC STABILITY: TRANSPORTATION INSECURITY
IN THE PAST 12 MONTHS, HAS LACK OF TRANSPORTATION KEPT YOU FROM MEETINGS, WORK, OR FROM GETTING THINGS NEEDED FOR DAILY LIVING?: NO

## 2025-02-18 SDOH — ECONOMIC STABILITY: FOOD INSECURITY: WITHIN THE PAST 12 MONTHS, YOU WORRIED THAT YOUR FOOD WOULD RUN OUT BEFORE YOU GOT MONEY TO BUY MORE.: NEVER TRUE

## 2025-02-18 SDOH — ECONOMIC STABILITY: INCOME INSECURITY: IN THE LAST 12 MONTHS, WAS THERE A TIME WHEN YOU WERE NOT ABLE TO PAY THE MORTGAGE OR RENT ON TIME?: NO

## 2025-02-18 SDOH — ECONOMIC STABILITY: FOOD INSECURITY: WITHIN THE PAST 12 MONTHS, THE FOOD YOU BOUGHT JUST DIDN'T LAST AND YOU DIDN'T HAVE MONEY TO GET MORE.: NEVER TRUE

## 2025-02-18 SDOH — ECONOMIC STABILITY: TRANSPORTATION INSECURITY
IN THE PAST 12 MONTHS, HAS THE LACK OF TRANSPORTATION KEPT YOU FROM MEDICAL APPOINTMENTS OR FROM GETTING MEDICATIONS?: NO

## 2025-02-18 ASSESSMENT — PATIENT HEALTH QUESTIONNAIRE - PHQ9
SUM OF ALL RESPONSES TO PHQ QUESTIONS 1-9: 0
2. FEELING DOWN, DEPRESSED OR HOPELESS: NOT AT ALL
1. LITTLE INTEREST OR PLEASURE IN DOING THINGS: NOT AT ALL
2. FEELING DOWN, DEPRESSED OR HOPELESS: NOT AT ALL
SUM OF ALL RESPONSES TO PHQ9 QUESTIONS 1 & 2: 0
1. LITTLE INTEREST OR PLEASURE IN DOING THINGS: NOT AT ALL
SUM OF ALL RESPONSES TO PHQ QUESTIONS 1-9: 0
SUM OF ALL RESPONSES TO PHQ9 QUESTIONS 1 & 2: 0
SUM OF ALL RESPONSES TO PHQ QUESTIONS 1-9: 0
SUM OF ALL RESPONSES TO PHQ QUESTIONS 1-9: 0

## 2025-02-21 ENCOUNTER — OFFICE VISIT (OUTPATIENT)
Dept: PRIMARY CARE CLINIC | Facility: CLINIC | Age: 35
End: 2025-02-21
Payer: COMMERCIAL

## 2025-02-21 VITALS
BODY MASS INDEX: 43.4 KG/M2 | OXYGEN SATURATION: 99 % | WEIGHT: 293 LBS | HEIGHT: 69 IN | HEART RATE: 67 BPM | TEMPERATURE: 97.5 F | DIASTOLIC BLOOD PRESSURE: 88 MMHG | SYSTOLIC BLOOD PRESSURE: 138 MMHG

## 2025-02-21 DIAGNOSIS — E78.49 OTHER HYPERLIPIDEMIA: ICD-10-CM

## 2025-02-21 DIAGNOSIS — Z71.82 EXERCISE COUNSELING: ICD-10-CM

## 2025-02-21 DIAGNOSIS — E28.2 PCOD (POLYCYSTIC OVARIAN DISEASE): Primary | ICD-10-CM

## 2025-02-21 DIAGNOSIS — Z71.3 DIETARY COUNSELING: ICD-10-CM

## 2025-02-21 DIAGNOSIS — E66.9 OBESITY, UNSPECIFIED OBESITY SEVERITY, UNSPECIFIED OBESITY TYPE: ICD-10-CM

## 2025-02-21 DIAGNOSIS — E55.9 VITAMIN D DEFICIENCY: ICD-10-CM

## 2025-02-21 PROCEDURE — 3075F SYST BP GE 130 - 139MM HG: CPT | Performed by: FAMILY MEDICINE

## 2025-02-21 PROCEDURE — 3079F DIAST BP 80-89 MM HG: CPT | Performed by: FAMILY MEDICINE

## 2025-02-21 PROCEDURE — 99214 OFFICE O/P EST MOD 30 MIN: CPT | Performed by: FAMILY MEDICINE

## 2025-02-21 RX ORDER — ERGOCALCIFEROL 1.25 MG/1
50000 CAPSULE, LIQUID FILLED ORAL WEEKLY
Qty: 12 CAPSULE | Refills: 1 | Status: SHIPPED | OUTPATIENT
Start: 2025-03-23

## 2025-02-21 RX ORDER — AVOBENZONE, HOMOSALATE, OCTISALATE, OCTOCRYLENE 30; 40; 45; 26 MG/ML; MG/ML; MG/ML; MG/ML
1 CREAM TOPICAL DAILY
Qty: 100 EACH | Refills: 3 | Status: SHIPPED | OUTPATIENT
Start: 2025-02-21

## 2025-02-21 RX ORDER — IBUPROFEN 800 MG/1
1 TABLET, FILM COATED ORAL EVERY 8 HOURS PRN
COMMUNITY

## 2025-02-21 RX ORDER — BLOOD-GLUCOSE METER
1 EACH MISCELLANEOUS DAILY
Qty: 1 KIT | Refills: 0 | Status: SHIPPED | OUTPATIENT
Start: 2025-02-21

## 2025-02-21 RX ORDER — METFORMIN HYDROCHLORIDE 500 MG/1
500 TABLET, EXTENDED RELEASE ORAL
Qty: 90 TABLET | Refills: 0 | Status: SHIPPED | OUTPATIENT
Start: 2025-02-21

## 2025-02-21 ASSESSMENT — ENCOUNTER SYMPTOMS
GASTROINTESTINAL NEGATIVE: 1
ALLERGIC/IMMUNOLOGIC NEGATIVE: 1
EYES NEGATIVE: 1
RESPIRATORY NEGATIVE: 1

## 2025-02-21 NOTE — PROGRESS NOTES
controlled gestational diabetes mellitus (GDM) in second trimester 2021    Disorder of pregnancy 2021    Encounter for planned induction of labor 12/15/2019    Gestational hypertension     History of elective  8/1/2016    x2    Hydronephrosis of fetus in james pregnancy, antepartum 10/7/2021    Hypertension     Obesity     PCOS (polycystic ovarian syndrome)        Past Surgical History:   Procedure Laterality Date     SECTION       SECTION      DILATION AND CURETTAGE OF UTERUS N/A 2022    DILATATION AND CURETTAGE SUCTION performed by Mary Miller MD at [facility] MAIN OR    OTHER SURGICAL HISTORY  2014    EAB x2    TONSILLECTOMY  2010    WISDOM TOOTH EXTRACTION         Social History     Socioeconomic History    Marital status:      Spouse name: Not on file    Number of children: Not on file    Years of education: Not on file    Highest education level: Not on file   Occupational History    Not on file   Tobacco Use    Smoking status: Never    Smokeless tobacco: Never   Vaping Use    Vaping status: Never Used   Substance and Sexual Activity    Alcohol use: No    Drug use: Never    Sexual activity: Yes     Partners: Male     Birth control/protection: None     Comment: trying to conceive   Other Topics Concern    Not on file   Social History Narrative    Not on file     Social Determinants of Health     Financial Resource Strain: Low Risk  (2024)    Overall Financial Resource Strain (CARDIA)     Difficulty of Paying Living Expenses: Not hard at all   Food Insecurity: No Food Insecurity (2025)    Hunger Vital Sign     Worried About Running Out of Food in the Last Year: Never true     Ran Out of Food in the Last Year: Never true   Transportation Needs: No Transportation Needs (2025)    PRAPARE - Transportation     Lack of Transportation (Medical): No     Lack of Transportation (Non-Medical): No   Physical Activity: Insufficiently Active

## 2025-03-19 DIAGNOSIS — E28.2 PCOD (POLYCYSTIC OVARIAN DISEASE): ICD-10-CM

## 2025-03-19 RX ORDER — METFORMIN HYDROCHLORIDE 500 MG/1
500 TABLET, EXTENDED RELEASE ORAL 2 TIMES DAILY
Qty: 60 TABLET | Refills: 2 | OUTPATIENT
Start: 2025-03-19

## 2025-05-06 NOTE — ANESTHESIA PREPROCEDURE EVALUATION
Abdomen soft and hypoactive bowel sounds x4 quadrants.     Relevant Problems   CARDIOVASCULAR   (+) Chronic hypertension affecting pregnancy      RENAL FAILURE   (+) Hydronephrosis of fetus in kenney pregnancy, antepartum      ENDOCRINE   (+) Insulin controlled gestational diabetes mellitus (GDM) in third trimester   (+) Obesity affecting pregnancy in third trimester       Anesthetic History   No history of anesthetic complications            Review of Systems / Medical History  Patient summary reviewed, nursing notes reviewed and pertinent labs reviewed    Pulmonary  Within defined limits                 Neuro/Psych   Within defined limits           Cardiovascular    Hypertension: well controlled              Exercise tolerance: >4 METS     GI/Hepatic/Renal     GERD: well controlled           Endo/Other    Diabetes (gdm): well controlled    Morbid obesity and anemia     Other Findings   Comments: Post section abdominal hematoma    COVID +         Physical Exam    Airway  Mallampati: II  TM Distance: 4 - 6 cm  Neck ROM: normal range of motion   Mouth opening: Normal     Cardiovascular  Regular rate and rhythm,  S1 and S2 normal,  no murmur, click, rub, or gallop             Dental  No notable dental hx       Pulmonary  Breath sounds clear to auscultation               Abdominal         Other Findings            Anesthetic Plan    ASA: 3, emergent  Anesthesia type: general          Induction: Intravenous  Anesthetic plan and risks discussed with: Patient and Family

## 2025-05-08 DIAGNOSIS — E66.9 OBESITY, UNSPECIFIED OBESITY SEVERITY, UNSPECIFIED OBESITY TYPE: ICD-10-CM

## 2025-05-09 RX ORDER — TIRZEPATIDE 7.5 MG/.5ML
INJECTION, SOLUTION SUBCUTANEOUS
Refills: 2 | OUTPATIENT
Start: 2025-05-09

## 2025-05-16 ENCOUNTER — OFFICE VISIT (OUTPATIENT)
Dept: PRIMARY CARE CLINIC | Facility: CLINIC | Age: 35
End: 2025-05-16
Payer: COMMERCIAL

## 2025-05-16 VITALS
WEIGHT: 269 LBS | DIASTOLIC BLOOD PRESSURE: 100 MMHG | HEART RATE: 64 BPM | SYSTOLIC BLOOD PRESSURE: 153 MMHG | RESPIRATION RATE: 17 BRPM | TEMPERATURE: 97.9 F | OXYGEN SATURATION: 99 % | BODY MASS INDEX: 39.84 KG/M2 | HEIGHT: 69 IN

## 2025-05-16 DIAGNOSIS — E78.49 OTHER HYPERLIPIDEMIA: ICD-10-CM

## 2025-05-16 DIAGNOSIS — Z71.3 DIETARY COUNSELING: ICD-10-CM

## 2025-05-16 DIAGNOSIS — E55.9 VITAMIN D DEFICIENCY: ICD-10-CM

## 2025-05-16 DIAGNOSIS — E28.2 PCOD (POLYCYSTIC OVARIAN DISEASE): ICD-10-CM

## 2025-05-16 DIAGNOSIS — Z71.82 EXERCISE COUNSELING: ICD-10-CM

## 2025-05-16 DIAGNOSIS — I10 PRIMARY HYPERTENSION: Primary | ICD-10-CM

## 2025-05-16 DIAGNOSIS — E66.9 OBESITY, UNSPECIFIED OBESITY SEVERITY, UNSPECIFIED OBESITY TYPE: ICD-10-CM

## 2025-05-16 PROCEDURE — 3080F DIAST BP >= 90 MM HG: CPT | Performed by: FAMILY MEDICINE

## 2025-05-16 PROCEDURE — 3077F SYST BP >= 140 MM HG: CPT | Performed by: FAMILY MEDICINE

## 2025-05-16 PROCEDURE — 99214 OFFICE O/P EST MOD 30 MIN: CPT | Performed by: FAMILY MEDICINE

## 2025-05-16 RX ORDER — ERGOCALCIFEROL 1.25 MG/1
50000 CAPSULE, LIQUID FILLED ORAL WEEKLY
Qty: 12 CAPSULE | Refills: 0 | Status: SHIPPED | OUTPATIENT
Start: 2025-05-16

## 2025-05-16 RX ORDER — LOSARTAN POTASSIUM 25 MG/1
25 TABLET ORAL DAILY
Qty: 90 TABLET | Refills: 0 | Status: SHIPPED | OUTPATIENT
Start: 2025-05-16

## 2025-05-16 RX ORDER — METFORMIN HYDROCHLORIDE 500 MG/1
500 TABLET, EXTENDED RELEASE ORAL
Qty: 90 TABLET | Refills: 0 | Status: SHIPPED | OUTPATIENT
Start: 2025-05-16

## 2025-05-16 NOTE — PROGRESS NOTES
[facility]  [address]  [place] [address]  Office : [phone]  Fax : [phone]      Subjective:  The patient is a 35 y.o. female  who presents for f/u on multiple chronic medical conditions-good compliance with medications--pt here to get routeine labs and need refill on meds.no cardiopulmonary symptoms  Hyperlipidemia-diet controlled  PCOD--on metformin on and off  Vitamin d def-on supplement  Obesity-interested in weight loss med-doing well on ZEPBOUND in --interested to go up on the dose-tolerating well  Hx of BP and DM in pregnancy--home readings normal-monitored-monitored at home  Hypertension in -start losartan-pt uses IUD    Patient Active Problem List   Diagnosis    Obesity affecting pregnancy in third trimester    H/O  section complicating pregnancy    High-risk pregnancy in third trimester    COVID-19 virus infection    Nonhealing surgical wound    COVID    Postoperative wound cellulitis    Noncompliant pregnant patient in third trimester    37 weeks gestation of pregnancy    Chronic hypertension affecting pregnancy    Postpartum  wound disruption    Severe obesity (BMI >= 40) (HCC)    Polycystic ovary affecting pregnancy, antepartum    Wound hematoma following  section, postpartum    Hydronephrosis of fetus in james pregnancy, antepartum    Insulin controlled gestational diabetes mellitus (GDM) in third trimester    Incomplete  without complication    PCOD (polycystic ovarian disease)    Vertigo    Fluctuating blood pressure    History of gestational diabetes    Obesity, unspecified obesity severity, unspecified obesity type    Dietary counseling    Exercise counseling    Vitamin D deficiency    Other hyperlipidemia    Primary hypertension    Body mass index [BMI] 45.0-49.9, adult (Z68.42)    IUD (intrauterine device) in place       Past Medical History:   Diagnosis Date    39 weeks gestation of pregnancy 12/15/2019

## 2025-06-08 DIAGNOSIS — E66.9 OBESITY, UNSPECIFIED OBESITY SEVERITY, UNSPECIFIED OBESITY TYPE: ICD-10-CM

## 2025-06-09 RX ORDER — TIRZEPATIDE 10 MG/.5ML
INJECTION, SOLUTION SUBCUTANEOUS
Refills: 1 | OUTPATIENT
Start: 2025-06-09

## 2025-07-07 DIAGNOSIS — E66.9 OBESITY, UNSPECIFIED OBESITY SEVERITY, UNSPECIFIED OBESITY TYPE: ICD-10-CM

## 2025-07-08 RX ORDER — TIRZEPATIDE 10 MG/.5ML
INJECTION, SOLUTION SUBCUTANEOUS
Refills: 1 | OUTPATIENT
Start: 2025-07-08

## 2025-08-08 ENCOUNTER — OFFICE VISIT (OUTPATIENT)
Dept: PRIMARY CARE CLINIC | Facility: CLINIC | Age: 35
End: 2025-08-08
Payer: COMMERCIAL

## 2025-08-08 ENCOUNTER — PATIENT MESSAGE (OUTPATIENT)
Dept: PRIMARY CARE CLINIC | Facility: CLINIC | Age: 35
End: 2025-08-08

## 2025-08-08 VITALS
SYSTOLIC BLOOD PRESSURE: 136 MMHG | BODY MASS INDEX: 37.62 KG/M2 | WEIGHT: 254 LBS | TEMPERATURE: 99 F | HEIGHT: 69 IN | RESPIRATION RATE: 16 BRPM | OXYGEN SATURATION: 98 % | DIASTOLIC BLOOD PRESSURE: 83 MMHG | HEART RATE: 64 BPM

## 2025-08-08 DIAGNOSIS — Z71.82 EXERCISE COUNSELING: ICD-10-CM

## 2025-08-08 DIAGNOSIS — E55.9 VITAMIN D DEFICIENCY: ICD-10-CM

## 2025-08-08 DIAGNOSIS — Z71.3 DIETARY COUNSELING: ICD-10-CM

## 2025-08-08 DIAGNOSIS — I10 PRIMARY HYPERTENSION: Primary | ICD-10-CM

## 2025-08-08 DIAGNOSIS — E66.9 OBESITY, UNSPECIFIED OBESITY SEVERITY, UNSPECIFIED OBESITY TYPE: ICD-10-CM

## 2025-08-08 DIAGNOSIS — E28.2 PCOD (POLYCYSTIC OVARIAN DISEASE): ICD-10-CM

## 2025-08-08 DIAGNOSIS — E78.49 OTHER HYPERLIPIDEMIA: ICD-10-CM

## 2025-08-08 PROCEDURE — 99214 OFFICE O/P EST MOD 30 MIN: CPT | Performed by: FAMILY MEDICINE

## 2025-08-08 PROCEDURE — 3079F DIAST BP 80-89 MM HG: CPT | Performed by: FAMILY MEDICINE

## 2025-08-08 PROCEDURE — 3075F SYST BP GE 130 - 139MM HG: CPT | Performed by: FAMILY MEDICINE

## 2025-08-08 RX ORDER — ERGOCALCIFEROL 1.25 MG/1
50000 CAPSULE, LIQUID FILLED ORAL WEEKLY
Qty: 12 CAPSULE | Refills: 1 | Status: SHIPPED | OUTPATIENT
Start: 2025-08-08

## 2025-08-08 RX ORDER — CLINDAMYCIN PHOSPHATE 10 MG/G
GEL TOPICAL
COMMUNITY
Start: 2025-07-22

## 2025-08-08 RX ORDER — TRETINOIN 0.25 MG/G
CREAM TOPICAL
COMMUNITY
Start: 2025-07-22

## 2025-08-08 RX ORDER — LOSARTAN POTASSIUM 50 MG/1
50 TABLET ORAL DAILY
Qty: 90 TABLET | Refills: 1 | Status: SHIPPED | OUTPATIENT
Start: 2025-08-08

## 2025-08-08 RX ORDER — METFORMIN HYDROCHLORIDE 500 MG/1
500 TABLET, EXTENDED RELEASE ORAL
Qty: 90 TABLET | Refills: 0 | Status: CANCELLED | OUTPATIENT
Start: 2025-08-08

## 2025-08-08 ASSESSMENT — ENCOUNTER SYMPTOMS
EYES NEGATIVE: 1
ALLERGIC/IMMUNOLOGIC NEGATIVE: 1
RESPIRATORY NEGATIVE: 1
GASTROINTESTINAL NEGATIVE: 1

## 2025-08-15 DIAGNOSIS — E55.9 VITAMIN D DEFICIENCY: ICD-10-CM

## 2025-08-15 DIAGNOSIS — E28.2 PCOD (POLYCYSTIC OVARIAN DISEASE): ICD-10-CM

## 2025-08-15 DIAGNOSIS — I10 PRIMARY HYPERTENSION: ICD-10-CM

## 2025-08-15 DIAGNOSIS — Z71.82 EXERCISE COUNSELING: ICD-10-CM

## 2025-08-15 DIAGNOSIS — Z71.3 DIETARY COUNSELING: ICD-10-CM

## 2025-08-15 DIAGNOSIS — E78.49 OTHER HYPERLIPIDEMIA: ICD-10-CM

## 2025-08-15 DIAGNOSIS — E66.9 OBESITY, UNSPECIFIED OBESITY SEVERITY, UNSPECIFIED OBESITY TYPE: ICD-10-CM

## 2025-08-15 LAB
25(OH)D3 SERPL-MCNC: 43.5 NG/ML (ref 30–100)
ALBUMIN SERPL-MCNC: 4 G/DL (ref 3.5–5)
ALBUMIN/GLOB SERPL: 1.2 (ref 1–1.9)
ALP SERPL-CCNC: 32 U/L (ref 35–104)
ALT SERPL-CCNC: 23 U/L (ref 8–45)
ANION GAP SERPL CALC-SCNC: 11 MMOL/L (ref 7–16)
AST SERPL-CCNC: 19 U/L (ref 15–37)
BASOPHILS # BLD: 0.02 K/UL (ref 0–0.2)
BASOPHILS NFR BLD: 0.4 % (ref 0–2)
BILIRUB SERPL-MCNC: 0.5 MG/DL (ref 0–1.2)
BUN SERPL-MCNC: 13 MG/DL (ref 6–23)
CALCIUM SERPL-MCNC: 9.6 MG/DL (ref 8.8–10.2)
CHLORIDE SERPL-SCNC: 103 MMOL/L (ref 98–107)
CHOLEST SERPL-MCNC: 185 MG/DL (ref 0–200)
CO2 SERPL-SCNC: 25 MMOL/L (ref 20–29)
CREAT SERPL-MCNC: 0.98 MG/DL (ref 0.6–1.1)
DIFFERENTIAL METHOD BLD: ABNORMAL
EOSINOPHIL # BLD: 0.18 K/UL (ref 0–0.8)
EOSINOPHIL NFR BLD: 3.5 % (ref 0.5–7.8)
ERYTHROCYTE [DISTWIDTH] IN BLOOD BY AUTOMATED COUNT: 12.8 % (ref 11.9–14.6)
EST. AVERAGE GLUCOSE BLD GHB EST-MCNC: 93 MG/DL
GLOBULIN SER CALC-MCNC: 3.4 G/DL (ref 2.3–3.5)
GLUCOSE SERPL-MCNC: 77 MG/DL (ref 70–99)
HBA1C MFR BLD: 4.9 % (ref 0–5.6)
HCT VFR BLD AUTO: 41.2 % (ref 35.8–46.3)
HDLC SERPL-MCNC: 54 MG/DL (ref 40–60)
HDLC SERPL: 3.5 (ref 0–5)
HGB BLD-MCNC: 13.2 G/DL (ref 11.7–15.4)
IMM GRANULOCYTES # BLD AUTO: 0.01 K/UL (ref 0–0.5)
IMM GRANULOCYTES NFR BLD AUTO: 0.2 % (ref 0–5)
IRON SERPL-MCNC: 68 UG/DL (ref 35–100)
LDLC SERPL CALC-MCNC: 118 MG/DL (ref 0–100)
LYMPHOCYTES # BLD: 1.49 K/UL (ref 0.5–4.6)
LYMPHOCYTES NFR BLD: 28.8 % (ref 13–44)
MCH RBC QN AUTO: 32.5 PG (ref 26.1–32.9)
MCHC RBC AUTO-ENTMCNC: 32 G/DL (ref 31.4–35)
MCV RBC AUTO: 101.5 FL (ref 82–102)
MONOCYTES # BLD: 0.4 K/UL (ref 0.1–1.3)
MONOCYTES NFR BLD: 7.7 % (ref 4–12)
NEUTS SEG # BLD: 3.08 K/UL (ref 1.7–8.2)
NEUTS SEG NFR BLD: 59.4 % (ref 43–78)
NRBC # BLD: 0 K/UL (ref 0–0.2)
PLATELET # BLD AUTO: 140 K/UL (ref 150–450)
PMV BLD AUTO: 11.9 FL (ref 9.4–12.3)
POTASSIUM SERPL-SCNC: 4.2 MMOL/L (ref 3.5–5.1)
PROT SERPL-MCNC: 7.4 G/DL (ref 6.3–8.2)
RBC # BLD AUTO: 4.06 M/UL (ref 4.05–5.2)
SODIUM SERPL-SCNC: 139 MMOL/L (ref 136–145)
TRIGL SERPL-MCNC: 66 MG/DL (ref 0–150)
TSH W FREE THYROID IF ABNORMAL: 1.63 UIU/ML (ref 0.27–4.2)
VLDLC SERPL CALC-MCNC: 13 MG/DL (ref 6–23)
WBC # BLD AUTO: 5.2 K/UL (ref 4.3–11.1)

## 2025-08-18 DIAGNOSIS — E66.9 OBESITY, UNSPECIFIED OBESITY SEVERITY, UNSPECIFIED OBESITY TYPE: ICD-10-CM

## (undated) DEVICE — CARDINAL HEALTH FLEXIBLE LIGHT HANDLE COVER: Brand: CARDINAL HEALTH

## (undated) DEVICE — SUTURE PLN GUT SZ 2-0 L27IN ABSRB YELLOWISH TAN L40MM CT 853H

## (undated) DEVICE — DRAPE,UNDERBUTTOCKS,PCH,STERILE: Brand: MEDLINE

## (undated) DEVICE — SUTURE VCRL SZ 4-0 L18IN ABSRB UD L19MM PS-2 3/8 CIR PRIM J496H

## (undated) DEVICE — BARRIER ADHESION INTERCEED ABSORBABLE 3INW X 4INL

## (undated) DEVICE — SOLUTION IRRIG 1000ML H2O STRL BLT

## (undated) DEVICE — SOLUTION IRRIG 1000ML 0.9% SOD CHL USP POUR PLAS BTL

## (undated) DEVICE — POWDER HEMOSTAT GEL 3.0GR -- SURGICEL

## (undated) DEVICE — SURGICAL PROCEDURE PACK C SECT CDS

## (undated) DEVICE — PENCIL ES L3M BTTN SWCH S STL HEX LOK BLDE ELECTRD HOLSTER

## (undated) DEVICE — MEDI-VAC NON-CONDUCTIVE SUCTION TUBING: Brand: CARDINAL HEALTH

## (undated) DEVICE — CANNULA VAC 9MM CRV SEMI RIG STRL

## (undated) DEVICE — SUTURE VCRL SZ 2-0 L36IN ABSRB VLT L36MM CT-1 1/2 CIR J345H

## (undated) DEVICE — REM POLYHESIVE ADULT PATIENT RETURN ELECTRODE: Brand: VALLEYLAB

## (undated) DEVICE — PREMIUM WET SKIN PREP TRAY: Brand: MEDLINE INDUSTRIES, INC.

## (undated) DEVICE — Device: Brand: PORTEX

## (undated) DEVICE — TRAXI PANNICULUS RETRACTOR WITH RETENTUS TECHNOLOGY (BMI 30-50): Brand: TRAXI® PANNICULUS RETRACTOR

## (undated) DEVICE — U.V.A.C. SWIVEL HANDLE W/TUBING, 6': Brand: CONVERTORS

## (undated) DEVICE — TRAY CATH 16FR PVC INTMIT STR TIP PREATTACH TO 1000ML COLL

## (undated) DEVICE — STERILE POLYISOPRENE POWDER-FREE SURGICAL GLOVES: Brand: PROTEXIS

## (undated) DEVICE — SUT VCRL + 0 36IN CT1 UD --

## (undated) DEVICE — SOLUTION IV 1000ML 0.9% SOD CHL

## (undated) DEVICE — SUTURE VCRL SZ 0 L36IN ABSRB UD L36MM CT-1 1/2 CIR J946H

## (undated) DEVICE — MINOR LITHOTOMY PACK: Brand: MEDLINE INDUSTRIES, INC.

## (undated) DEVICE — AMD ANTIMICROBIAL NON-ADHERENT PAD,0.2% POLYHEXAMETHYLENE BIGUANIDE HCI (PHMB): Brand: TELFA

## (undated) DEVICE — AMD ANTIMICROBIAL GAUZE SPONGES,12 PLY USP TYPE VII, 0.2% POLYHEXAMETHYLENE BIGUANIDE HCI (PHMB): Brand: CURITY

## (undated) DEVICE — KENDALL SCD EXPRESS SLEEVES, KNEE LENGTH, MEDIUM: Brand: KENDALL SCD

## (undated) DEVICE — EXTRA LARGE, DISPOSABLE C-SECTION PROTECTOR - RETRACTOR: Brand: ALEXIS ® O C-SECTION PROTECTOR - RETRACTOR

## (undated) DEVICE — GLOVE SURG SZ 65 THK91MIL LTX FREE SYN POLYISOPRENE

## (undated) DEVICE — BARRIER TISS ADH ABSRB 3X4IN -- GYNECARE INTERCEED